# Patient Record
Sex: MALE | Race: ASIAN | NOT HISPANIC OR LATINO
[De-identification: names, ages, dates, MRNs, and addresses within clinical notes are randomized per-mention and may not be internally consistent; named-entity substitution may affect disease eponyms.]

---

## 2017-06-22 ENCOUNTER — CHARTING TRANS (OUTPATIENT)
Dept: OTHER | Age: 67
End: 2017-06-22

## 2017-08-01 ENCOUNTER — CHARTING TRANS (OUTPATIENT)
Dept: OTHER | Age: 67
End: 2017-08-01

## 2017-08-23 ENCOUNTER — LAB SERVICES (OUTPATIENT)
Dept: OTHER | Age: 67
End: 2017-08-23

## 2017-08-23 ENCOUNTER — CHARTING TRANS (OUTPATIENT)
Dept: OTHER | Age: 67
End: 2017-08-23

## 2017-08-23 LAB
GLUCOSE BLD MANUAL STRIP-MCNC: 111 MG/DL (ref 66–97)
GLUCOSE BLD MANUAL STRIP-MCNC: 85 MG/DL (ref 66–97)

## 2017-08-24 ENCOUNTER — CHARTING TRANS (OUTPATIENT)
Dept: OTHER | Age: 67
End: 2017-08-24

## 2017-09-05 ENCOUNTER — CHARTING TRANS (OUTPATIENT)
Dept: OTHER | Age: 67
End: 2017-09-05

## 2017-09-11 ENCOUNTER — LAB SERVICES (OUTPATIENT)
Dept: OTHER | Age: 67
End: 2017-09-11

## 2017-09-11 ENCOUNTER — CHARTING TRANS (OUTPATIENT)
Dept: OTHER | Age: 67
End: 2017-09-11

## 2017-09-11 LAB
GLUCOSE BLD MANUAL STRIP-MCNC: 102 MG/DL (ref 66–97)
GLUCOSE BLD MANUAL STRIP-MCNC: 109 MG/DL (ref 66–97)

## 2017-09-15 ENCOUNTER — CHARTING TRANS (OUTPATIENT)
Dept: OTHER | Age: 67
End: 2017-09-15

## 2017-10-06 ENCOUNTER — CHARTING TRANS (OUTPATIENT)
Dept: OTHER | Age: 67
End: 2017-10-06

## 2017-11-02 ENCOUNTER — CHARTING TRANS (OUTPATIENT)
Dept: OTHER | Age: 67
End: 2017-11-02

## 2019-05-13 ENCOUNTER — OFFICE VISIT (OUTPATIENT)
Dept: OPHTHALMOLOGY | Age: 69
End: 2019-05-13

## 2019-05-13 DIAGNOSIS — H26.491 PCO (POSTERIOR CAPSULAR OPACIFICATION), RIGHT: ICD-10-CM

## 2019-05-13 DIAGNOSIS — H35.373 MACULAR PUCKER, BILATERAL: ICD-10-CM

## 2019-05-13 DIAGNOSIS — Z79.4 TYPE 2 DIABETES MELLITUS WITH BOTH EYES AFFECTED BY PROLIFERATIVE RETINOPATHY WITHOUT MACULAR EDEMA, WITH LONG-TERM CURRENT USE OF INSULIN (CMD): Primary | ICD-10-CM

## 2019-05-13 DIAGNOSIS — E11.3593 TYPE 2 DIABETES MELLITUS WITH BOTH EYES AFFECTED BY PROLIFERATIVE RETINOPATHY WITHOUT MACULAR EDEMA, WITH LONG-TERM CURRENT USE OF INSULIN (CMD): Primary | ICD-10-CM

## 2019-05-13 PROCEDURE — 99214 OFFICE O/P EST MOD 30 MIN: CPT | Performed by: OPHTHALMOLOGY

## 2019-05-16 ENCOUNTER — OFFICE VISIT (OUTPATIENT)
Dept: OPHTHALMOLOGY | Age: 69
End: 2019-05-16

## 2019-05-16 DIAGNOSIS — E11.3593 TYPE 2 DIABETES MELLITUS WITH BOTH EYES AFFECTED BY PROLIFERATIVE RETINOPATHY WITHOUT MACULAR EDEMA, WITH LONG-TERM CURRENT USE OF INSULIN (CMD): Primary | ICD-10-CM

## 2019-05-16 DIAGNOSIS — Z79.4 TYPE 2 DIABETES MELLITUS WITH BOTH EYES AFFECTED BY PROLIFERATIVE RETINOPATHY WITHOUT MACULAR EDEMA, WITH LONG-TERM CURRENT USE OF INSULIN (CMD): Primary | ICD-10-CM

## 2019-05-16 PROCEDURE — 67228 TREATMENT X10SV RETINOPATHY: CPT | Performed by: OPHTHALMOLOGY

## 2019-05-23 ENCOUNTER — OFFICE VISIT (OUTPATIENT)
Dept: OPHTHALMOLOGY | Age: 69
End: 2019-05-23

## 2019-05-23 DIAGNOSIS — Z79.4 TYPE 2 DIABETES MELLITUS WITH BOTH EYES AFFECTED BY PROLIFERATIVE RETINOPATHY WITHOUT MACULAR EDEMA, WITH LONG-TERM CURRENT USE OF INSULIN (CMD): Primary | ICD-10-CM

## 2019-05-23 DIAGNOSIS — E11.3593 TYPE 2 DIABETES MELLITUS WITH BOTH EYES AFFECTED BY PROLIFERATIVE RETINOPATHY WITHOUT MACULAR EDEMA, WITH LONG-TERM CURRENT USE OF INSULIN (CMD): Primary | ICD-10-CM

## 2019-05-23 PROCEDURE — 67228 TREATMENT X10SV RETINOPATHY: CPT | Performed by: OPHTHALMOLOGY

## 2019-07-26 ENCOUNTER — OFFICE VISIT (OUTPATIENT)
Dept: OPHTHALMOLOGY | Age: 69
End: 2019-07-26

## 2019-07-26 DIAGNOSIS — H35.373 MACULAR PUCKER, BILATERAL: ICD-10-CM

## 2019-07-26 DIAGNOSIS — Z79.4 TYPE 2 DIABETES MELLITUS WITH BOTH EYES AFFECTED BY MILD NONPROLIFERATIVE RETINOPATHY WITHOUT MACULAR EDEMA, WITH LONG-TERM CURRENT USE OF INSULIN (CMD): Primary | ICD-10-CM

## 2019-07-26 DIAGNOSIS — E11.3293 TYPE 2 DIABETES MELLITUS WITH BOTH EYES AFFECTED BY MILD NONPROLIFERATIVE RETINOPATHY WITHOUT MACULAR EDEMA, WITH LONG-TERM CURRENT USE OF INSULIN (CMD): Primary | ICD-10-CM

## 2019-07-26 PROBLEM — E11.3593 TYPE 2 DIABETES MELLITUS WITH BOTH EYES AFFECTED BY PROLIFERATIVE RETINOPATHY WITHOUT MACULAR EDEMA, WITH LONG-TERM CURRENT USE OF INSULIN (CMD): Status: RESOLVED | Noted: 2019-05-13 | Resolved: 2019-07-26

## 2019-07-26 PROCEDURE — 99213 OFFICE O/P EST LOW 20 MIN: CPT | Performed by: OPHTHALMOLOGY

## 2020-02-15 ENCOUNTER — APPOINTMENT (OUTPATIENT)
Dept: OPHTHALMOLOGY | Age: 70
End: 2020-02-15

## 2020-03-03 ENCOUNTER — OFFICE VISIT (OUTPATIENT)
Dept: OPHTHALMOLOGY | Age: 70
End: 2020-03-03

## 2020-03-03 DIAGNOSIS — H26.491 PCO (POSTERIOR CAPSULAR OPACIFICATION), RIGHT: ICD-10-CM

## 2020-03-03 DIAGNOSIS — H35.373 MACULAR PUCKER, BILATERAL: ICD-10-CM

## 2020-03-03 DIAGNOSIS — E11.3293 TYPE 2 DIABETES MELLITUS WITH BOTH EYES AFFECTED BY MILD NONPROLIFERATIVE RETINOPATHY WITHOUT MACULAR EDEMA, WITH LONG-TERM CURRENT USE OF INSULIN (CMD): Primary | ICD-10-CM

## 2020-03-03 DIAGNOSIS — Z79.4 TYPE 2 DIABETES MELLITUS WITH BOTH EYES AFFECTED BY MILD NONPROLIFERATIVE RETINOPATHY WITHOUT MACULAR EDEMA, WITH LONG-TERM CURRENT USE OF INSULIN (CMD): Primary | ICD-10-CM

## 2020-03-03 PROCEDURE — 92014 COMPRE OPH EXAM EST PT 1/>: CPT | Performed by: OPHTHALMOLOGY

## 2020-03-03 RX ORDER — INSULIN ASPART 100 [IU]/ML
INJECTION, SUSPENSION SUBCUTANEOUS
Refills: 5 | COMMUNITY
Start: 2020-01-09

## 2020-09-03 ENCOUNTER — OFFICE VISIT (OUTPATIENT)
Dept: OPHTHALMOLOGY | Age: 70
End: 2020-09-03

## 2020-09-03 DIAGNOSIS — H26.491 PCO (POSTERIOR CAPSULAR OPACIFICATION), RIGHT: ICD-10-CM

## 2020-09-03 DIAGNOSIS — Z79.4 TYPE 2 DIABETES MELLITUS WITH BOTH EYES AFFECTED BY MILD NONPROLIFERATIVE RETINOPATHY WITHOUT MACULAR EDEMA, WITH LONG-TERM CURRENT USE OF INSULIN (CMD): Primary | ICD-10-CM

## 2020-09-03 DIAGNOSIS — E11.3293 TYPE 2 DIABETES MELLITUS WITH BOTH EYES AFFECTED BY MILD NONPROLIFERATIVE RETINOPATHY WITHOUT MACULAR EDEMA, WITH LONG-TERM CURRENT USE OF INSULIN (CMD): Primary | ICD-10-CM

## 2020-09-03 DIAGNOSIS — H35.373 MACULAR PUCKER, BILATERAL: ICD-10-CM

## 2020-09-03 PROCEDURE — 92012 INTRM OPH EXAM EST PATIENT: CPT | Performed by: OPHTHALMOLOGY

## 2021-02-02 ENCOUNTER — HOSPITAL ENCOUNTER (INPATIENT)
Facility: HOSPITAL | Age: 71
LOS: 18 days | Discharge: HOME | DRG: 252 | End: 2021-02-20
Attending: EMERGENCY MEDICINE | Admitting: HOSPITALIST
Payer: MEDICARE

## 2021-02-02 ENCOUNTER — APPOINTMENT (OUTPATIENT)
Dept: GENERAL RADIOLOGY | Facility: HOSPITAL | Age: 71
DRG: 252 | End: 2021-02-02
Attending: EMERGENCY MEDICINE
Payer: MEDICARE

## 2021-02-02 DIAGNOSIS — L02.612 ABSCESS OF LEFT FOOT: ICD-10-CM

## 2021-02-02 DIAGNOSIS — E11.9 TYPE 2 DIABETES MELLITUS WITHOUT COMPLICATION, UNSPECIFIED WHETHER LONG TERM INSULIN USE (HCC): ICD-10-CM

## 2021-02-02 DIAGNOSIS — E11.628 DIABETIC FOOT INFECTION (HCC): Primary | ICD-10-CM

## 2021-02-02 DIAGNOSIS — M86.9 OSTEITIS OF ANKLE AND FOOT (HCC): ICD-10-CM

## 2021-02-02 DIAGNOSIS — L08.9 DIABETIC FOOT INFECTION (HCC): Primary | ICD-10-CM

## 2021-02-02 LAB
ALBUMIN SERPL-MCNC: 2.3 G/DL (ref 3.4–5)
ALBUMIN/GLOB SERPL: 0.4 {RATIO} (ref 1–2)
ALP LIVER SERPL-CCNC: 327 U/L
ALT SERPL-CCNC: 31 U/L
ANION GAP SERPL CALC-SCNC: 5 MMOL/L (ref 0–18)
AST SERPL-CCNC: 32 U/L (ref 15–37)
BASOPHILS # BLD AUTO: 0.02 X10(3) UL (ref 0–0.2)
BASOPHILS NFR BLD AUTO: 0.2 %
BILIRUB SERPL-MCNC: 1 MG/DL (ref 0.1–2)
BUN BLD-MCNC: 15 MG/DL (ref 7–18)
BUN/CREAT SERPL: 10.3 (ref 10–20)
CALCIUM BLD-MCNC: 9.1 MG/DL (ref 8.5–10.1)
CHLORIDE SERPL-SCNC: 101 MMOL/L (ref 98–112)
CO2 SERPL-SCNC: 29 MMOL/L (ref 21–32)
CREAT BLD-MCNC: 1.46 MG/DL
DEPRECATED RDW RBC AUTO: 43.5 FL (ref 35.1–46.3)
EOSINOPHIL # BLD AUTO: 0.13 X10(3) UL (ref 0–0.7)
EOSINOPHIL NFR BLD AUTO: 1.4 %
ERYTHROCYTE [DISTWIDTH] IN BLOOD BY AUTOMATED COUNT: 14.7 % (ref 11–15)
EST. AVERAGE GLUCOSE BLD GHB EST-MCNC: 148 MG/DL (ref 68–126)
GLOBULIN PLAS-MCNC: 5.4 G/DL (ref 2.8–4.4)
GLUCOSE BLD-MCNC: 135 MG/DL (ref 70–99)
GLUCOSE BLD-MCNC: 160 MG/DL (ref 70–99)
GLUCOSE BLD-MCNC: 169 MG/DL (ref 70–99)
HBA1C MFR BLD HPLC: 6.8 % (ref ?–5.7)
HCT VFR BLD AUTO: 30.4 %
HGB BLD-MCNC: 9.6 G/DL
IMM GRANULOCYTES # BLD AUTO: 0.19 X10(3) UL (ref 0–1)
IMM GRANULOCYTES NFR BLD: 2.1 %
LACTATE SERPL-SCNC: 2 MMOL/L (ref 0.4–2)
LYMPHOCYTES # BLD AUTO: 1.26 X10(3) UL (ref 1–4)
LYMPHOCYTES NFR BLD AUTO: 13.9 %
M PROTEIN MFR SERPL ELPH: 7.7 G/DL (ref 6.4–8.2)
MCH RBC QN AUTO: 25.7 PG (ref 26–34)
MCHC RBC AUTO-ENTMCNC: 31.6 G/DL (ref 31–37)
MCV RBC AUTO: 81.5 FL
MONOCYTES # BLD AUTO: 0.73 X10(3) UL (ref 0.1–1)
MONOCYTES NFR BLD AUTO: 8 %
NEUTROPHILS # BLD AUTO: 6.74 X10 (3) UL (ref 1.5–7.7)
NEUTROPHILS # BLD AUTO: 6.74 X10(3) UL (ref 1.5–7.7)
NEUTROPHILS NFR BLD AUTO: 74.4 %
OSMOLALITY SERPL CALC.SUM OF ELEC: 284 MOSM/KG (ref 275–295)
PLATELET # BLD AUTO: 294 10(3)UL (ref 150–450)
POTASSIUM SERPL-SCNC: 4 MMOL/L (ref 3.5–5.1)
RBC # BLD AUTO: 3.73 X10(6)UL
SARS-COV-2 RNA RESP QL NAA+PROBE: NOT DETECTED
SODIUM SERPL-SCNC: 135 MMOL/L (ref 136–145)
WBC # BLD AUTO: 9.1 X10(3) UL (ref 4–11)

## 2021-02-02 PROCEDURE — 99223 1ST HOSP IP/OBS HIGH 75: CPT | Performed by: HOSPITALIST

## 2021-02-02 PROCEDURE — 73630 X-RAY EXAM OF FOOT: CPT | Performed by: EMERGENCY MEDICINE

## 2021-02-02 RX ORDER — VANCOMYCIN HYDROCHLORIDE
15 EVERY 12 HOURS
Status: DISCONTINUED | OUTPATIENT
Start: 2021-02-03 | End: 2021-02-04

## 2021-02-02 RX ORDER — MORPHINE SULFATE 2 MG/ML
1 INJECTION, SOLUTION INTRAMUSCULAR; INTRAVENOUS EVERY 2 HOUR PRN
Status: DISCONTINUED | OUTPATIENT
Start: 2021-02-02 | End: 2021-02-10

## 2021-02-02 RX ORDER — INSULIN ASPART 100 [IU]/ML
INJECTION, SUSPENSION SUBCUTANEOUS 3 TIMES DAILY PRN
Status: ON HOLD | COMMUNITY
End: 2021-12-28

## 2021-02-02 RX ORDER — HYDROCODONE BITARTRATE AND ACETAMINOPHEN 5; 325 MG/1; MG/1
1 TABLET ORAL EVERY 4 HOURS PRN
Status: DISCONTINUED | OUTPATIENT
Start: 2021-02-02 | End: 2021-02-10 | Stop reason: SDUPTHER

## 2021-02-02 RX ORDER — SODIUM CHLORIDE 9 MG/ML
INJECTION, SOLUTION INTRAVENOUS CONTINUOUS
Status: DISCONTINUED | OUTPATIENT
Start: 2021-02-02 | End: 2021-02-04

## 2021-02-02 RX ORDER — ONDANSETRON 2 MG/ML
4 INJECTION INTRAMUSCULAR; INTRAVENOUS EVERY 6 HOURS PRN
Status: DISCONTINUED | OUTPATIENT
Start: 2021-02-02 | End: 2021-02-20

## 2021-02-02 RX ORDER — VANCOMYCIN 2 GRAM/500 ML IN 0.9 % SODIUM CHLORIDE INTRAVENOUS
25 ONCE
Status: COMPLETED | OUTPATIENT
Start: 2021-02-02 | End: 2021-02-02

## 2021-02-02 RX ORDER — METOCLOPRAMIDE HYDROCHLORIDE 5 MG/ML
10 INJECTION INTRAMUSCULAR; INTRAVENOUS EVERY 8 HOURS PRN
Status: DISCONTINUED | OUTPATIENT
Start: 2021-02-02 | End: 2021-02-20

## 2021-02-02 RX ORDER — MORPHINE SULFATE 2 MG/ML
2 INJECTION, SOLUTION INTRAMUSCULAR; INTRAVENOUS EVERY 2 HOUR PRN
Status: DISCONTINUED | OUTPATIENT
Start: 2021-02-02 | End: 2021-02-10

## 2021-02-02 RX ORDER — DEXTROSE MONOHYDRATE 25 G/50ML
50 INJECTION, SOLUTION INTRAVENOUS
Status: DISCONTINUED | OUTPATIENT
Start: 2021-02-02 | End: 2021-02-20

## 2021-02-02 RX ORDER — ACETAMINOPHEN 325 MG/1
650 TABLET ORAL EVERY 4 HOURS PRN
Status: DISCONTINUED | OUTPATIENT
Start: 2021-02-02 | End: 2021-02-10 | Stop reason: SDUPTHER

## 2021-02-02 RX ORDER — HYDROCODONE BITARTRATE AND ACETAMINOPHEN 5; 325 MG/1; MG/1
2 TABLET ORAL EVERY 4 HOURS PRN
Status: DISCONTINUED | OUTPATIENT
Start: 2021-02-02 | End: 2021-02-10 | Stop reason: SDUPTHER

## 2021-02-02 NOTE — ED PROVIDER NOTES
Patient Seen in: [facility] Emergency Department      History   Patient presents with:  Cellulitis    Stated Complaint: Leg infection ~ hx of diabetes     HPI/Subjective:   HPI    26-year-old male who has a history of diabetes neuropathy comes to th bowel sounds without rebound, guarding or masses noted  Back nontender without CVA tenderness  Extremity erythema of his left foot noted up to the midportion of his calf muscle.  He has two large ulcerations with pussy discharge to the left lateral foot oth COMPARISON:  None. INDICATIONS:  Leg infection ~ hx of diabetes  PATIENT STATED HISTORY: (As transcribed by Technologist)  Pt was at PCP and sent over for evaluation of left leg cellulitis. Pt has  hx of diabetes.  Pt has recent left fifth digit amputation

## 2021-02-02 NOTE — ED INITIAL ASSESSMENT (HPI)
Pt was at [facility] and sent over for evaluation of left leg cellulitis. Pt has hx of diabetes. Pt has recent left fifth digit amputation and wound.

## 2021-02-03 ENCOUNTER — APPOINTMENT (OUTPATIENT)
Dept: ULTRASOUND IMAGING | Facility: HOSPITAL | Age: 71
DRG: 252 | End: 2021-02-03
Attending: HOSPITALIST
Payer: MEDICARE

## 2021-02-03 ENCOUNTER — TELEPHONE (OUTPATIENT)
Dept: PODIATRY CLINIC | Facility: CLINIC | Age: 71
End: 2021-02-03

## 2021-02-03 ENCOUNTER — ANESTHESIA EVENT (OUTPATIENT)
Dept: SURGERY | Facility: HOSPITAL | Age: 71
DRG: 252 | End: 2021-02-03
Payer: MEDICARE

## 2021-02-03 ENCOUNTER — APPOINTMENT (OUTPATIENT)
Dept: MRI IMAGING | Facility: HOSPITAL | Age: 71
DRG: 252 | End: 2021-02-03
Attending: HOSPITALIST
Payer: MEDICARE

## 2021-02-03 DIAGNOSIS — L02.612 ABSCESS OF LEFT FOOT: Primary | ICD-10-CM

## 2021-02-03 DIAGNOSIS — M86.9 OSTEITIS OF ANKLE AND FOOT (HCC): ICD-10-CM

## 2021-02-03 LAB
ANION GAP SERPL CALC-SCNC: 5 MMOL/L (ref 0–18)
BASOPHILS # BLD AUTO: 0.04 X10(3) UL (ref 0–0.2)
BASOPHILS NFR BLD AUTO: 0.4 %
BILIRUB UR QL STRIP.AUTO: NEGATIVE
BUN BLD-MCNC: 16 MG/DL (ref 7–18)
BUN/CREAT SERPL: 10.7 (ref 10–20)
CALCIUM BLD-MCNC: 8.5 MG/DL (ref 8.5–10.1)
CHLORIDE SERPL-SCNC: 106 MMOL/L (ref 98–112)
CHOLEST SMN-MCNC: 100 MG/DL (ref ?–200)
CLARITY UR REFRACT.AUTO: CLEAR
CO2 SERPL-SCNC: 26 MMOL/L (ref 21–32)
COLOR UR AUTO: YELLOW
CREAT BLD-MCNC: 1.49 MG/DL
CREAT UR-SCNC: 75 MG/DL
DEPRECATED RDW RBC AUTO: 45.3 FL (ref 35.1–46.3)
EOSINOPHIL # BLD AUTO: 0.09 X10(3) UL (ref 0–0.7)
EOSINOPHIL NFR BLD AUTO: 1 %
ERYTHROCYTE [DISTWIDTH] IN BLOOD BY AUTOMATED COUNT: 15 % (ref 11–15)
GLUCOSE BLD-MCNC: 122 MG/DL (ref 70–99)
GLUCOSE BLD-MCNC: 136 MG/DL (ref 70–99)
GLUCOSE BLD-MCNC: 142 MG/DL (ref 70–99)
GLUCOSE BLD-MCNC: 176 MG/DL (ref 70–99)
GLUCOSE BLD-MCNC: 247 MG/DL (ref 70–99)
GLUCOSE BLD-MCNC: 256 MG/DL (ref 70–99)
GLUCOSE UR STRIP.AUTO-MCNC: NEGATIVE MG/DL
HCT VFR BLD AUTO: 29.5 %
HDLC SERPL-MCNC: 27 MG/DL (ref 40–59)
HGB BLD-MCNC: 9.1 G/DL
IMM GRANULOCYTES # BLD AUTO: 0.1 X10(3) UL (ref 0–1)
IMM GRANULOCYTES NFR BLD: 1.1 %
KETONES UR STRIP.AUTO-MCNC: NEGATIVE MG/DL
LDLC SERPL CALC-MCNC: 55 MG/DL (ref ?–100)
LDLC SERPL DIRECT ASSAY-MCNC: 59 MG/DL (ref ?–100)
LEUKOCYTE ESTERASE UR QL STRIP.AUTO: NEGATIVE
LYMPHOCYTES # BLD AUTO: 1.69 X10(3) UL (ref 1–4)
LYMPHOCYTES NFR BLD AUTO: 18.4 %
MCH RBC QN AUTO: 25.9 PG (ref 26–34)
MCHC RBC AUTO-ENTMCNC: 30.8 G/DL (ref 31–37)
MCV RBC AUTO: 84 FL
MICROALBUMIN UR-MCNC: 39.9 MG/DL
MICROALBUMIN/CREAT 24H UR-RTO: 532 UG/MG (ref ?–30)
MONOCYTES # BLD AUTO: 0.98 X10(3) UL (ref 0.1–1)
MONOCYTES NFR BLD AUTO: 10.7 %
NEUTROPHILS # BLD AUTO: 6.29 X10 (3) UL (ref 1.5–7.7)
NEUTROPHILS # BLD AUTO: 6.29 X10(3) UL (ref 1.5–7.7)
NEUTROPHILS NFR BLD AUTO: 68.4 %
NITRITE UR QL STRIP.AUTO: NEGATIVE
NONHDLC SERPL-MCNC: 73 MG/DL (ref ?–130)
OSMOLALITY SERPL CALC.SUM OF ELEC: 286 MOSM/KG (ref 275–295)
PH UR STRIP.AUTO: 7 [PH] (ref 4.5–8)
PLATELET # BLD AUTO: 281 10(3)UL (ref 150–450)
POTASSIUM SERPL-SCNC: 4.7 MMOL/L (ref 3.5–5.1)
PROT UR STRIP.AUTO-MCNC: 100 MG/DL
RBC # BLD AUTO: 3.51 X10(6)UL
SODIUM SERPL-SCNC: 137 MMOL/L (ref 136–145)
SP GR UR STRIP.AUTO: 1.02 (ref 1–1.03)
TRIGL SERPL-MCNC: 88 MG/DL (ref 30–149)
UROBILINOGEN UR STRIP.AUTO-MCNC: <2 MG/DL
VLDLC SERPL CALC-MCNC: 18 MG/DL (ref 0–30)
WBC # BLD AUTO: 9.2 X10(3) UL (ref 4–11)

## 2021-02-03 PROCEDURE — 99223 1ST HOSP IP/OBS HIGH 75: CPT | Performed by: PODIATRIST

## 2021-02-03 PROCEDURE — 99233 SBSQ HOSP IP/OBS HIGH 50: CPT | Performed by: HOSPITALIST

## 2021-02-03 PROCEDURE — 99223 1ST HOSP IP/OBS HIGH 75: CPT | Performed by: INTERNAL MEDICINE

## 2021-02-03 PROCEDURE — 99221 1ST HOSP IP/OBS SF/LOW 40: CPT | Performed by: INTERNAL MEDICINE

## 2021-02-03 PROCEDURE — 73718 MRI LOWER EXTREMITY W/O DYE: CPT | Performed by: HOSPITALIST

## 2021-02-03 PROCEDURE — 93926 LOWER EXTREMITY STUDY: CPT | Performed by: HOSPITALIST

## 2021-02-03 RX ORDER — SODIUM CHLORIDE 9 MG/ML
INJECTION, SOLUTION INTRAVENOUS CONTINUOUS
Status: ACTIVE | OUTPATIENT
Start: 2021-02-03 | End: 2021-02-04

## 2021-02-03 RX ORDER — ENOXAPARIN SODIUM 100 MG/ML
40 INJECTION SUBCUTANEOUS DAILY
Status: DISCONTINUED | OUTPATIENT
Start: 2021-02-03 | End: 2021-02-04

## 2021-02-03 RX ORDER — POLYETHYLENE GLYCOL 3350 17 G/17G
17 POWDER, FOR SOLUTION ORAL DAILY
Status: DISCONTINUED | OUTPATIENT
Start: 2021-02-03 | End: 2021-02-20

## 2021-02-03 RX ORDER — METRONIDAZOLE 500 MG/100ML
500 INJECTION, SOLUTION INTRAVENOUS EVERY 8 HOURS
Status: DISCONTINUED | OUTPATIENT
Start: 2021-02-03 | End: 2021-02-04

## 2021-02-03 RX ORDER — MAGNESIUM OXIDE 400 MG (241.3 MG MAGNESIUM) TABLET
2 TABLET NIGHTLY
Status: DISCONTINUED | OUTPATIENT
Start: 2021-02-03 | End: 2021-02-07

## 2021-02-03 NOTE — CONSULTS
[facility]  Report of Consultation    Josephine Demiguel Patient Status:  Inpatient    1950 MRN [id number]   [facility] 5NW-A Attending Maureen Ponce MD   Hosp Day # 1 PCP Shyla Chun     Date of Admission:  2021  Date of Consult **OR** HYDROcodone-acetaminophen (NORCO) 5-325 MG per tab 1 tablet, 1 tablet, Oral, Q4H PRN **OR** HYDROcodone-acetaminophen (NORCO) 5-325 MG per tab 2 tablet, 2 tablet, Oral, Q4H PRN  •  morphINE sulfate (PF) 2 MG/ML injection 1 mg, 1 mg, Intravenous, Q2H ALT 31 02/02/2021    PGLU 136 02/03/2021       Impression and Plan:  Diabetic foot infection with draining purulence and cellulitis left foot. Patient will need surgical debridement.   Will obtain MRI to more thoroughly evaluate for underlying osteomyeliti

## 2021-02-03 NOTE — H&P
[facility]  History and Physical     Earna [facility] Patient Status:  Inpatient    1950 MRN [id number]   [facility] 5NW-A Attending Silvio Espinosa MD   Hosp Day # 0 PCP LUIS ALFREDO SHAFFER     Chief Complaint: foot infection    History Anicteric. Neck: No lymphadenopathy. No JVD. No carotid bruits. Respiratory: Clear to auscultation bilaterally. No wheezes. No rhonchi. Cardiovascular: S1, S2. Regular rate and rhythm. No murmurs, rubs or gallops. Equal pulses.    Chest and Back: No tend two midnight's based on the clinical documentation in H+P. Based on patients current state of illness, I anticipate that, after discharge, patient will require TBD.

## 2021-02-03 NOTE — CONSULTS
[facility]  Report of Consultation    Lisandro Simpson Patient Status:  Inpatient    1950 MRN [id number]   [facility] 5NW-A Attending Anuja Baron MD   Hosp Day # 1  [facility]     Reason for Consultation:  CKD III/need for io Units, Subcutaneous, TID CC and HS  •  0.9% NaCl infusion, , Intravenous, Continuous  •  acetaminophen (TYLENOL) tab 650 mg, 650 mg, Oral, Q4H PRN **OR** HYDROcodone-acetaminophen (NORCO) 5-325 MG per tab 1 tablet, 1 tablet, Oral, Q4H PRN **OR** HYDROcodon cyanosis or edema.  L foot notable for erythema and purulent drainage from mult wounds  Neurologic: Alert and oriented, cranial nerves grossly intact, moving all extremities  Skin: Warm and dry, no rashes      Laboratory Data:  Lab Results   Component Value concerns.        Regla Barrow  2/3/2021  4:42 PM

## 2021-02-03 NOTE — PROGRESS NOTES
Dr Familia Carlson and Dr Randolph Cowden consults were called in, ID returned call, waiting for podiatrist to call back

## 2021-02-03 NOTE — PLAN OF CARE
Patient A/O x 4. VSS. Maintaining O2 saturations >92% on RA. Glasses at bedside. C/o moderate discomfort d/t left foot cellulitis, open wounds. Black, eschar noted. Bloody/purulent discharge noted on sheets.  Rinsed w/ saline - 4x4 applied and wrapped in ke Short Term Goal  Description: Patient's Short Term Goal:   2/2 NOC: rest, pain control    Interventions:   - medications per MAR  - decrease environmental stimuli  - See additional Care Plan goals for specific interventions  Outcome: Progressing     Proble

## 2021-02-03 NOTE — CONSULTS
[facility]  Report of Consultation    Julio Bundy Patient Status:  Inpatient    1950 MRN [id number]   [facility] 5NW-A Attending Chari Durant MD   Hosp Day # 1  [facility]     Reason for Consultation:  Infected left fo Continuous  •  glucose (DEX4) oral liquid 15 g, 15 g, Oral, Q15 Min PRN **OR** Glucose-Vitamin C (DEX-4) chewable tab 4 tablet, 4 tablet, Oral, Q15 Min PRN **OR** dextrose 50 % injection 50 mL, 50 mL, Intravenous, Q15 Min PRN **OR** glucose (DEX4) oral liq affect. Skin: Warm and dry. Laboratories and Data:  Diagnostics:    Labs:   Lab Results   Component Value Date    HGB 9.1 02/03/2021    HCT 29.5 02/03/2021    .0 02/03/2021               Assessment/Plan:    1.  PAD -patient has a cellulitis and

## 2021-02-03 NOTE — CONSULTS
INFECTIOUS DISEASE CONSULT NOTE    Lisandro Simpson Patient Status:  Inpatient    1950 MRN [id number]   [facility] 5NW-A Attending Anuja Baron MD   Hosp Day # 1 PCP Arely Call Glucose-Vitamin C (DEX-4) chewable tab 8 tablet, 8 tablet, Oral, Q15 Min PRN  •  Insulin Aspart Pen (NOVOLOG) 100 UNIT/ML flexpen 2-10 Units, 2-10 Units, Subcutaneous, TID CC and HS  •  0.9% NaCl infusion, , Intravenous, Continuous  •  acetaminophen (TYLEN lymphadenopathy. Supple. Respiratory: Clear to auscultation bilaterally. No wheezes. No rhonchi. Cardiovascular: S1, S2.  Regular rate and rhythm. No murmurs. Abdomen: Soft, nontender, nondistended. Positive bowel sounds.   Musculoskeletal: Full rang Result No Growth 1 Day N/A         Radiology: Xr Foot, Complete (min 3 Views), Left (cpt=73630)    Result Date: 2/2/2021  PROCEDURE:  XR FOOT, COMPLETE (MIN 3 VIEWS), LEFT (CPT=73630)  TECHNIQUE:  AP, oblique, and lateral views were obtained.   COMPARISON: have any questions. I will continue to follow with you and will make further recommendations based on his progress. Chavo 27  2/3/2021  1:24 PM    Addendum    MRI noted. Has OM of the 5th metatarsal. D/w Dr Loni Harrison.  Plan is for OR tomorrow, agree

## 2021-02-03 NOTE — ANESTHESIA PREPROCEDURE EVALUATION
PRE-OP EVALUATION    Patient Name: Rose Brown    Pre-op Diagnosis: Abscess of left foot [L02.612]  Osteitis of ankle and foot ([address] [M86.9]    Procedure(s):  Debridement of left foot infection with possible amputation of 5th metarsal.    Surgeon(s) and •  ondansetron HCl (ZOFRAN) injection 4 mg, 4 mg, Intravenous, Q6H PRN    •  Metoclopramide HCl (REGLAN) injection 10 mg, 10 mg, Intravenous, Q8H PRN    •  [COMPLETED] vancomycin IVPB premix 2g in 0.9% NaCl 500 mL, 25 mg/kg, Intravenous, Once    •  vancomy CA 8.5 02/03/2021            Airway      Mallampati: III    TM distance: 4 - 6 cm  Neck ROM: full Cardiovascular    Cardiovascular exam normal.  Rhythm: regular  Rate: normal     Dental    No notable dental history.          Pulmonary    Pulmonary exam nor

## 2021-02-03 NOTE — TELEPHONE ENCOUNTER
Per [facility] scheduled patient who is currently an in patient in room 503 for 2/4/21 at 5:30 pm

## 2021-02-03 NOTE — PROGRESS NOTES
[facility] Dosing Service    Initial Pharmacokinetic Consult for Vancomycin Dosing     Tommy Candelaria is a 65year old patient who is being treated for cellulitis. Pharmacy has been asked to dose Vancomycin by Dr. Janna Seals.     Allergies:  Patient has no kn

## 2021-02-03 NOTE — PROGRESS NOTES
[facility]  Progress note     Lottie Pool Patient Status:  Inpatient    1950 MRN [id number]   [facility] 5NW-A Attending Ashia Areavlo MD   Hosp Day # 1 PCP LUIS ALFREDO SHAFFER     Chief Complaint: foot infection  Subjective: linda cellulitis, DFU; suspect osteo  1. IV abx, f/u cultures  2. ID eval  3. Podiatry eval  4. MRI and arterial dopplers ordered  2. DMII, hyperglycemia protocol  3. Diabetic neuropathy  4. Suspect CKD, no baseline Cr to compare  1. Monitor gfr  5.  HTN, ?relate

## 2021-02-04 ENCOUNTER — APPOINTMENT (OUTPATIENT)
Dept: CT IMAGING | Facility: HOSPITAL | Age: 71
DRG: 252 | End: 2021-02-04
Attending: SURGERY
Payer: MEDICARE

## 2021-02-04 ENCOUNTER — APPOINTMENT (OUTPATIENT)
Dept: CV DIAGNOSTICS | Facility: HOSPITAL | Age: 71
DRG: 252 | End: 2021-02-04
Attending: INTERNAL MEDICINE
Payer: MEDICARE

## 2021-02-04 ENCOUNTER — APPOINTMENT (OUTPATIENT)
Dept: ULTRASOUND IMAGING | Facility: HOSPITAL | Age: 71
DRG: 252 | End: 2021-02-04
Attending: SURGERY
Payer: MEDICARE

## 2021-02-04 ENCOUNTER — ANESTHESIA (OUTPATIENT)
Dept: SURGERY | Facility: HOSPITAL | Age: 71
DRG: 252 | End: 2021-02-04
Payer: MEDICARE

## 2021-02-04 LAB
ANION GAP SERPL CALC-SCNC: 7 MMOL/L (ref 0–18)
ATRIAL RATE: 73 BPM
BUN BLD-MCNC: 20 MG/DL (ref 7–18)
BUN/CREAT SERPL: 15 (ref 10–20)
CALCIUM BLD-MCNC: 8.3 MG/DL (ref 8.5–10.1)
CHLORIDE SERPL-SCNC: 105 MMOL/L (ref 98–112)
CO2 SERPL-SCNC: 24 MMOL/L (ref 21–32)
CREAT BLD-MCNC: 1.33 MG/DL
CRP SERPL-MCNC: 9.95 MG/DL (ref ?–0.3)
GLUCOSE BLD-MCNC: 128 MG/DL (ref 70–99)
GLUCOSE BLD-MCNC: 141 MG/DL (ref 70–99)
GLUCOSE BLD-MCNC: 152 MG/DL (ref 70–99)
GLUCOSE BLD-MCNC: 154 MG/DL (ref 70–99)
GLUCOSE BLD-MCNC: 170 MG/DL (ref 70–99)
GLUCOSE BLD-MCNC: 178 MG/DL (ref 70–99)
GLUCOSE BLD-MCNC: 208 MG/DL (ref 70–99)
GLUCOSE BLD-MCNC: 241 MG/DL (ref 70–99)
OSMOLALITY SERPL CALC.SUM OF ELEC: 289 MOSM/KG (ref 275–295)
P AXIS: 45 DEGREES
P-R INTERVAL: 150 MS
POTASSIUM SERPL-SCNC: 4.3 MMOL/L (ref 3.5–5.1)
Q-T INTERVAL: 390 MS
QRS DURATION: 62 MS
QTC CALCULATION (BEZET): 429 MS
R AXIS: 56 DEGREES
SED RATE-ML: 104 MM/HR
SODIUM SERPL-SCNC: 136 MMOL/L (ref 136–145)
T AXIS: -26 DEGREES
VANCOMYCIN TROUGH SERPL-MCNC: 17.2 UG/ML (ref 10–20)
VENTRICULAR RATE: 73 BPM

## 2021-02-04 PROCEDURE — 93306 TTE W/DOPPLER COMPLETE: CPT | Performed by: INTERNAL MEDICINE

## 2021-02-04 PROCEDURE — 0QBP0ZZ EXCISION OF LEFT METATARSAL, OPEN APPROACH: ICD-10-PCS | Performed by: PODIATRIST

## 2021-02-04 PROCEDURE — 73706 CT ANGIO LWR EXTR W/O&W/DYE: CPT | Performed by: SURGERY

## 2021-02-04 PROCEDURE — 99232 SBSQ HOSP IP/OBS MODERATE 35: CPT | Performed by: INTERNAL MEDICINE

## 2021-02-04 PROCEDURE — 99233 SBSQ HOSP IP/OBS HIGH 50: CPT | Performed by: INTERNAL MEDICINE

## 2021-02-04 PROCEDURE — 99232 SBSQ HOSP IP/OBS MODERATE 35: CPT | Performed by: HOSPITALIST

## 2021-02-04 PROCEDURE — 28122 PARTIAL REMOVAL OF FOOT BONE: CPT | Performed by: PODIATRIST

## 2021-02-04 PROCEDURE — 0J9R0ZZ DRAINAGE OF LEFT FOOT SUBCUTANEOUS TISSUE AND FASCIA, OPEN APPROACH: ICD-10-PCS | Performed by: PODIATRIST

## 2021-02-04 PROCEDURE — 93970 EXTREMITY STUDY: CPT | Performed by: SURGERY

## 2021-02-04 RX ORDER — HYDROCODONE BITARTRATE AND ACETAMINOPHEN 5; 325 MG/1; MG/1
2 TABLET ORAL AS NEEDED
Status: DISCONTINUED | OUTPATIENT
Start: 2021-02-04 | End: 2021-02-04 | Stop reason: HOSPADM

## 2021-02-04 RX ORDER — ENOXAPARIN SODIUM 100 MG/ML
40 INJECTION SUBCUTANEOUS NIGHTLY
Status: DISCONTINUED | OUTPATIENT
Start: 2021-02-05 | End: 2021-02-10

## 2021-02-04 RX ORDER — MIDAZOLAM HYDROCHLORIDE 1 MG/ML
INJECTION INTRAMUSCULAR; INTRAVENOUS AS NEEDED
Status: DISCONTINUED | OUTPATIENT
Start: 2021-02-04 | End: 2021-02-04 | Stop reason: SURG

## 2021-02-04 RX ORDER — ASPIRIN 81 MG/1
81 TABLET ORAL DAILY
Status: DISCONTINUED | OUTPATIENT
Start: 2021-02-04 | End: 2021-02-10 | Stop reason: SDUPTHER

## 2021-02-04 RX ORDER — ATORVASTATIN CALCIUM 40 MG/1
40 TABLET, FILM COATED ORAL NIGHTLY
Status: DISCONTINUED | OUTPATIENT
Start: 2021-02-04 | End: 2021-02-20

## 2021-02-04 RX ORDER — SODIUM CHLORIDE 9 MG/ML
INJECTION, SOLUTION INTRAVENOUS CONTINUOUS PRN
Status: DISCONTINUED | OUTPATIENT
Start: 2021-02-04 | End: 2021-02-04 | Stop reason: SURG

## 2021-02-04 RX ORDER — DEXTROSE MONOHYDRATE 25 G/50ML
50 INJECTION, SOLUTION INTRAVENOUS
Status: DISCONTINUED | OUTPATIENT
Start: 2021-02-04 | End: 2021-02-04 | Stop reason: HOSPADM

## 2021-02-04 RX ORDER — METOPROLOL SUCCINATE 25 MG/1
25 TABLET, EXTENDED RELEASE ORAL
Status: DISCONTINUED | OUTPATIENT
Start: 2021-02-04 | End: 2021-02-20

## 2021-02-04 RX ORDER — LABETALOL HYDROCHLORIDE 5 MG/ML
5 INJECTION, SOLUTION INTRAVENOUS EVERY 5 MIN PRN
Status: DISCONTINUED | OUTPATIENT
Start: 2021-02-04 | End: 2021-02-04 | Stop reason: HOSPADM

## 2021-02-04 RX ORDER — SODIUM CHLORIDE, SODIUM LACTATE, POTASSIUM CHLORIDE, CALCIUM CHLORIDE 600; 310; 30; 20 MG/100ML; MG/100ML; MG/100ML; MG/100ML
INJECTION, SOLUTION INTRAVENOUS CONTINUOUS
Status: DISCONTINUED | OUTPATIENT
Start: 2021-02-04 | End: 2021-02-04 | Stop reason: HOSPADM

## 2021-02-04 RX ORDER — PHENYLEPHRINE HCL 10 MG/ML
VIAL (ML) INJECTION AS NEEDED
Status: DISCONTINUED | OUTPATIENT
Start: 2021-02-04 | End: 2021-02-04 | Stop reason: SURG

## 2021-02-04 RX ORDER — ONDANSETRON 2 MG/ML
4 INJECTION INTRAMUSCULAR; INTRAVENOUS AS NEEDED
Status: DISCONTINUED | OUTPATIENT
Start: 2021-02-04 | End: 2021-02-04 | Stop reason: HOSPADM

## 2021-02-04 RX ORDER — NALOXONE HYDROCHLORIDE 0.4 MG/ML
80 INJECTION, SOLUTION INTRAMUSCULAR; INTRAVENOUS; SUBCUTANEOUS AS NEEDED
Status: DISCONTINUED | OUTPATIENT
Start: 2021-02-04 | End: 2021-02-04 | Stop reason: HOSPADM

## 2021-02-04 RX ORDER — LIDOCAINE HYDROCHLORIDE 10 MG/ML
INJECTION, SOLUTION EPIDURAL; INFILTRATION; INTRACAUDAL; PERINEURAL AS NEEDED
Status: DISCONTINUED | OUTPATIENT
Start: 2021-02-04 | End: 2021-02-04 | Stop reason: SURG

## 2021-02-04 RX ORDER — HYDROCODONE BITARTRATE AND ACETAMINOPHEN 5; 325 MG/1; MG/1
1 TABLET ORAL AS NEEDED
Status: DISCONTINUED | OUTPATIENT
Start: 2021-02-04 | End: 2021-02-04 | Stop reason: HOSPADM

## 2021-02-04 RX ORDER — ONDANSETRON 2 MG/ML
INJECTION INTRAMUSCULAR; INTRAVENOUS AS NEEDED
Status: DISCONTINUED | OUTPATIENT
Start: 2021-02-04 | End: 2021-02-04 | Stop reason: SURG

## 2021-02-04 RX ORDER — HYDROMORPHONE HYDROCHLORIDE 1 MG/ML
0.4 INJECTION, SOLUTION INTRAMUSCULAR; INTRAVENOUS; SUBCUTANEOUS EVERY 5 MIN PRN
Status: DISCONTINUED | OUTPATIENT
Start: 2021-02-04 | End: 2021-02-04 | Stop reason: HOSPADM

## 2021-02-04 RX ADMIN — ONDANSETRON 4 MG: 2 INJECTION INTRAMUSCULAR; INTRAVENOUS at 17:03:00

## 2021-02-04 RX ADMIN — PHENYLEPHRINE HCL 100 MCG: 10 MG/ML VIAL (ML) INJECTION at 17:30:00

## 2021-02-04 RX ADMIN — PHENYLEPHRINE HCL 100 MCG: 10 MG/ML VIAL (ML) INJECTION at 17:03:00

## 2021-02-04 RX ADMIN — LIDOCAINE HYDROCHLORIDE 50 MG: 10 INJECTION, SOLUTION EPIDURAL; INFILTRATION; INTRACAUDAL; PERINEURAL at 17:03:00

## 2021-02-04 RX ADMIN — SODIUM CHLORIDE: 9 INJECTION, SOLUTION INTRAVENOUS at 18:48:00

## 2021-02-04 RX ADMIN — PHENYLEPHRINE HCL 100 MCG: 10 MG/ML VIAL (ML) INJECTION at 17:20:00

## 2021-02-04 RX ADMIN — MIDAZOLAM HYDROCHLORIDE 2 MG: 1 INJECTION INTRAMUSCULAR; INTRAVENOUS at 17:00:00

## 2021-02-04 RX ADMIN — PHENYLEPHRINE HCL 100 MCG: 10 MG/ML VIAL (ML) INJECTION at 17:10:00

## 2021-02-04 RX ADMIN — SODIUM CHLORIDE: 9 INJECTION, SOLUTION INTRAVENOUS at 16:59:00

## 2021-02-04 NOTE — PROGRESS NOTES
[facility]  Cardiology  Progress Note    Danielle Saldivar Patient Status:  Inpatient    1950 MRN [id number]   [facility] 5NW-A Attending Nara Lind MD   Hosp Day # 2 PCP LUIS ALFREDO SHAFFER       Subjective:  Denies any complaints.  Leelee Le size was normal. Wall thickness was normal.      Systolic function was mildly reduced. The estimated ejection fraction was      40-45%.  Doppler parameters are consistent with a reversible restrictive      pattern, indicative of decreased left ventricular d extremity: No significant change. Patient continues to have drainage had of his left foot. Patient CTA was reviewed. He appears to have an occlusion of the distal popliteal right at the TP trunk.   Only runoff vessel that I can see is that of a posteri

## 2021-02-04 NOTE — CONSULTS
[address]    PATIENT'S NAME: Vaibhav Risedeidre   ATTENDING PHYSICIAN: LUCRECIA Steward: Catherine Brantley M.D.    PATIENT ACCOUNT#:   [de-identified]    LOCATION:  [address] [place] [address]  MEDICAL RECORD #:   [id number]       DATE OF BIRTH:  [birth year] HISTORY:  The patient says both parents passed away from elderly age. He is still working. PHYSICAL EXAMINATION:    GENERAL:  He currently is awake, alert. He is appropriate. He is in no acute distress.   VITAL SIGNS:  His T-max was 99.2 that I can se mapping done. Have Dr. Willian Reilly to evaluate for cardiac status and also for evaluation of possible future angiogram.  Dr. Kenyetta London of Nephrology placed on consultation for prevention of any further renal injury.   The patient discussed the possibility for re

## 2021-02-04 NOTE — PLAN OF CARE
Pt is A/O X4, glasses at bedside. Room air. SR on tele, EKG completed, showed anteroseptal infarct, pt asymptomatic, MDs aware, no new orders. Lovenox. Voids freely. Denies pain or n/v. Up with standby assist. IVF. IV abx.  Left foot cellulitis wrapped with medications per [facility]  - wound care  - See additional Care Plan goals for specific interventions  Outcome: Progressing  Goal: Patient/Family Short Term Goal  Description: Patient's Short Term Goal:   2/2 NOC: rest, pain control  23 AM: have wound care come b on patient safety including physical limitations  - Instruct pt to call for assistance with activity based on assessment  - Modify environment to reduce risk of injury  - Provide assistive devices as appropriate  - Consider OT/PT consult to assist with str

## 2021-02-04 NOTE — CM/SW NOTE
02/04/21 1400   CM/SW Referral Data   Referral Source Physician   Reason for Referral Discharge planning   Informant Spouse   Patient Info   Patient's Mental Status Alert;Oriented   Patient's [address]   Number of Levels in Home 2   Patient

## 2021-02-04 NOTE — CONSULTS
Assessment and Plan:        COVID-19 infection: Patient is tachypneic and hypoxic with exertion. Feeling very short of breath at times.  -We will begin Decadron, noted that he is on chronic prednisone 10 mg.  6 mg Decadron would be delivering a greater glucocorticoid effect thus we will continue with Decadron for the time being  -Procalcitonin is very low do not suspect bacterial pneumonia  -Trend inflammatory markers  -Currently not hypoxic at rest likely not qualifying for Remdesivir thus will begin with convalescent plasma. Will monitor closely for indication of Remdesivir    Hyponatremia: Patient endorsing poor p.o. intake is already self corrected with IV fluids. We will continue to monitor and continue lactated Ringer's for now. History of severe dermatitis: Chronically managed with CellCept and prednisone. Holding prednisone while giving Decadron. Patient hold cellcept while he has infections, will hold. Hyperlipidemia: Continue statin    CC: COVID-19  HPI: (12 point review of systems completed. Pertinent positives noted. Otherwise ROS is negative) : She is 58-year-old male with past medical history of dermatitis that has been controlled with CellCept and prednisone, hyperlipidemia, GERD. He tested positive for corona virus within the last day or 2 and symptoms continuously progressed at home. His wife states that he was just not turning the corner and became more short of breath and was coughing continuously throughout the night. He was brought to the emergency room he was found to be hypoxic when he would ambulate. Otherwise when he is at rest he is saturating above 90%. He has low-grade fevers and is tachypneic.   He went to [facility] where chest x-ray showed bilateral pneumonia and given the progressive presentation it was recommended he be admitted for further care  PMH:  Per HPI  SHX:    Social History     Tobacco Use    Smoking status: Never Smoker    Smokeless tobacco: Never [facility]  Inpatient Wound Care Contact Note    Alethea Lucero Patient Status:  Inpatient    1950 MRN [id number]   [facility] 5NW-A Attending Akash Morley MD   Hosp Day # 2 PCP LUIS ALFREDO SHAFFER     Attempted to see patient for follo Used   Substance Use Topics    Alcohol use: Yes     Alcohol/week: 1.0 standard drinks     Types: 1 Glasses of wine per week    Drug use: No     FHX: History reviewed. No pertinent family history. Allergies: Allergies   Allergen Reactions    Sulfa Antibiotics      Medications:     sodium chloride 200 mL/hr at 09/19/20 0950    lactated ringers        cetirizine  10 mg Oral Daily    mycophenolate  500 mg Oral Daily    atorvastatin  20 mg Oral Nightly    sodium chloride  20 mL Intravenous Once    enoxaparin  30 mg Subcutaneous Q12H    dexamethasone  6 mg Oral Daily    sodium chloride flush  10 mL Intravenous 2 times per day       Vital Signs:   BP (!) 152/91   Pulse 70   Temp 99.8 °F (37.7 °C) (Oral)   Resp 20   Ht 5' 11\" (1.803 m)   Wt 180 lb (81.6 kg)   SpO2 95%   BMI 25.10 kg/m²    No intake or output data in the 24 hours ending 09/19/20 1625     General:   Resting in bed  HEENT:  normocephalic and atraumatic. No scleral icterus. PERR. Neck: supple. No JVD. No thyromegaly. Lungs: clear to auscultation. No retractions  Cardiac: RRR without murmur. Abdomen: soft. Nontender. Bowel sounds positive. Extremities:  No clubbing, cyanosis, or edema x 4. Vasculature: capillary refill < 3 seconds. Palpable LE pulses bilaterally. Skin:  warm and dry. Psych:  Alert and oriented x3. Affect appropriate  Lymph:  No supraclavicular adenopathy. Neurologic:  No focal deficit. No seizures. Data: (All radiographs, tracings, PFTs, and imaging are personally viewed and interpreted unless otherwise noted).    Recent Results (from the past 24 hour(s))   EKG SOB    Collection Time: 09/19/20  9:43 AM   Result Value Ref Range    Ventricular Rate 80 BPM    Atrial Rate 80 BPM    P-R Interval 162 ms    QRS Duration 96 ms    Q-T Interval 382 ms    QTc Calculation (Bazett) 440 ms    P Axis 61 degrees    R Axis 17 degrees    T Axis 16 degrees   CBC auto differential    Collection Time: 09/19/20  9:45 AM Result Value Ref Range    WBC 8.0 4.8 - 10.8 thou/mm3    RBC 4.45 (L) 4.70 - 6.10 mill/mm3    Hemoglobin 14.2 14.0 - 18.0 gm/dl    Hematocrit 42.5 42.0 - 52.0 %    MCV 95.5 (H) 80.0 - 94.0 fL    MCH 32.0 (H) 27.0 - 31.0 pg    MCHC 33.5 33.0 - 37.0 gm/dl    RDW 12.7 11.5 - 14.5 %    Platelets 722 434 - 929 thou/mm3    MPV 7.3 (L) 7.4 - 10.4 fL   Comprehensive metabolic panel    Collection Time: 09/19/20  9:45 AM   Result Value Ref Range    Glucose 138 (H) 74 - 106 mg/dl    CREATININE 1.0 0.6 - 1.3 mg/dl    BUN 17 7 - 18 mg/dl    Sodium 129 (L) 136 - 145 meq/l    Potassium 3.7 3.5 - 5.1 meq/l    Chloride 92 (L) 98 - 107 meq/l    CO2 27 21 - 32 meq/l    POC CALCIUM 8.5 8.5 - 10.1 mg/dl    AST 58 (H) 15 - 37 U/L    Alkaline Phosphatase 73 46 - 116 U/L    Total Protein 6.6 6.4 - 8.2 gm/dl    Alb 2.6 (L) 3.4 - 5.0 gm/dl    Total Bilirubin 0.4 0.2 - 1.0 mg/dl    ALT 59 14 - 63 U/L   Sedimentation Rate    Collection Time: 09/19/20  9:45 AM   Result Value Ref Range    Sed Rate 34 (H) 0 - 10 mm/hr   Troponin    Collection Time: 09/19/20  9:45 AM   Result Value Ref Range    Troponin I < 0.017 0.017 - 0.056 ng/ml   Glomerular Filtration Rate, Estimated    Collection Time: 09/19/20  9:45 AM   Result Value Ref Range    GFR, Estimated 78 (A) ml/min/1.73m2   ANION GAP    Collection Time: 09/19/20  9:45 AM   Result Value Ref Range    Anion Gap 10.0 8.0 - 16.0 meq/l   Differential    Collection Time: 09/19/20  9:45 AM   Result Value Ref Range    Seg Neutrophils 91.8 %    Lymphocytes 3.2 %    Monocytes 4.4 %    Eosinophils 0.0 %    Basophils 0.1 %    Immature Granulocytes 0.5 %    Segs Absolute 7.5 1.8 - 7.7 thou/mm3    Lymphocytes Absolute 0.3 (L) 1.0 - 4.8 thou/mm3    Monocytes Absolute 0.4 0.4 - 1.3 thou/mm3    Eosinophils Absolute 0.0 0.0 - 0.4 thou/mm3    Basophils Absolute 0.0 0.0 - 0.1 thou/mm3    Immature Grans (Abs) 0.04 0.00 - 0.07 thou/mm3    nRBC 0 /100 wbc   Lactic Acid [facility] Only)    Collection Time: 09/19/20 11:04 AM   Result Value Ref Range    Lactate 1.20 0.90 - 1.70 mmol/L   CBC Auto Differential    Collection Time: 09/19/20  2:40 PM   Result Value Ref Range    WBC 6.9 4.8 - 10.8 thou/mm3    RBC 4.67 (L) 4.70 - 6.10 mill/mm3    Hemoglobin 14.7 14.0 - 18.0 gm/dl    Hematocrit 43.2 42.0 - 52.0 %    MCV 92.5 80.0 - 94.0 fL    MCH 31.5 26.0 - 33.0 pg    MCHC 34.0 32.2 - 35.5 gm/dl    RDW-CV 12.3 11.5 - 14.5 %    RDW-SD 41.7 35.0 - 45.0 fL    Platelets 778 770 - 722 thou/mm3    MPV 9.2 (L) 9.4 - 12.4 fL    Seg Neutrophils 94.4 %    Lymphocytes 3.0 %    Monocytes 2.2 %    Eosinophils 0.0 %    Basophils 0.0 %    Immature Granulocytes 0.4 %    Segs Absolute 6.5 1.8 - 7.7 thou/mm3    Lymphocytes Absolute 0.2 (L) 1.0 - 4.8 thou/mm3    Monocytes Absolute 0.2 (L) 0.4 - 1.3 thou/mm3    Eosinophils Absolute 0.0 0.0 - 0.4 thou/mm3    Basophils Absolute 0.0 0.0 - 0.1 thou/mm3    Immature Grans (Abs) 0.03 0.00 - 0.07 thou/mm3    nRBC 0 /100 wbc   Comprehensive Metabolic Panel w/ Reflex to MG    Collection Time: 09/19/20  2:40 PM   Result Value Ref Range    Glucose 144 (H) 70 - 108 mg/dL    CREATININE 0.7 0.4 - 1.2 mg/dL    BUN 13 7 - 22 mg/dL    Sodium 134 (L) 135 - 145 meq/L    Potassium reflex Magnesium 4.1 3.5 - 5.2 meq/L    Chloride 97 (L) 98 - 111 meq/L    CO2 26 23 - 33 meq/L    Calcium 8.8 8.5 - 10.5 mg/dL    AST 50 (H) 5 - 40 U/L    Alkaline Phosphatase 80 38 - 126 U/L    Total Protein 6.9 6.1 - 8.0 g/dL    Alb 3.4 (L) 3.5 - 5.1 g/dL    Total Bilirubin 0.4 0.3 - 1.2 mg/dL    ALT 48 11 - 66 U/L   Protime-INR    Collection Time: 09/19/20  2:40 PM   Result Value Ref Range    INR 1.07 0.85 - 1.13   APTT    Collection Time: 09/19/20  2:40 PM   Result Value Ref Range    aPTT 28.7 22.0 - 38.0 seconds   Fibrinogen    Collection Time: 09/19/20  2:40 PM   Result Value Ref Range    Fibrinogen 604 (H) 155 - 475 mg/100ml   Lactate Dehydrogenase    Collection Time: 09/19/20  2:40 PM   Result Value Ref Range     (H) 100 - 190 U/L

## 2021-02-04 NOTE — OPERATIVE REPORT
Operative Note    Patient Name: Zeynep Irizarry    Preoperative Diagnosis: Abscess of left foot [L02.612] osteomyelitis of ankle and foot ([address] [M86.9]    Postoperative Diagnosis: Abscess of left foot [L02.612] osteomyelitis of ankle and foot ([address] [M86.9] were all resected and sent to pathology. Once we got to good healthy clean tissue we decided to do a pulse lavage with 3000 cc of normal saline.   Prior to the irrigation cultures were obtained soft tissue necrotic tissue was sent to path the bone was rese

## 2021-02-04 NOTE — PROGRESS NOTES
INFECTIOUS DISEASE PROGRESS NOTE    Breeshorty Kulkarni Patient Status:  Inpatient    1950 MRN [id number]   [facility] 5NW-A Attending Sen Aguilar MD   Hosp Day # 2 PCP Cecelia Hong PRN **OR** HYDROcodone-acetaminophen (NORCO) 5-325 MG per tab 2 tablet, 2 tablet, Oral, Q4H PRN  •  morphINE sulfate (PF) 2 MG/ML injection 1 mg, 1 mg, Intravenous, Q2H PRN **OR** morphINE sulfate (PF) 2 MG/ML injection 2 mg, 2 mg, Intravenous, Q2H PRN  • 1.0  --   --    TP 7.7  --   --      No results found for: ESRML   No results found for: CRP, HSCRP   Vancomycin Trough (ug/mL)   Date Value   02/04/2021 17.2     Recent Labs   Lab 02/03/21  1730   COLORUR Yellow   CLARITY Clear   SPECGRAVITY 1.016   GLUUR wound.     FINDINGS:  There are postoperative changes from amputation at the left 5th MTP joint with a small amount of possible residual left 5th proximal phalanx.   There is gas surrounding the head of the left 5th metatarsal which may represent infection

## 2021-02-04 NOTE — CONSULTS
[facility]    PATIENT'S NAME: Lester Ramirez   ATTENDING PHYSICIAN: Jarrod Mishra M.D.   [facility]: Lilliam Alonzo M.D.    PATIENT ACCOUNT#:   [de-identified]    LOCATION:  [address] [place] [address]  MEDICAL RECORD #:   [id number]       DATE OF BIRTH:  [birth year] flow going down to the leg may not be adequate for healing. Address and control infection per Dr. Jamaal Schroeder at this time and recommend Infectious Disease consult if not done.     Dictated By Milka Saavedra M.D.  d: 02/03/2021 15:58:15  t: 02/03/2021 16:49:07

## 2021-02-04 NOTE — CONSULTS
[facility] dosing service    Follow-up Pharmacokinetic Consult for Vancomycin Dosing     Nara Avilez is a 65year old patient who is being treated for osteomyelitis. Patient is on day 3 of Vancomycin and is currently receiving 1.25 gm IV Q 12 hours. Blood,peripheral   Result Value Ref Range    Blood Culture Result No Growth 1 Day N/A       Based on the above:    1. Continue Vancomycin at 1.25 gm IVPB Q 12 hours (based on Trough of 17.2 ug/mL and actual body weight of 77.1 kg and renal function)    2.

## 2021-02-04 NOTE — CM/SW NOTE
Referrals for home health sent to residential and to [facility] for infusion.     Ivette Bentley RN,   Phone [phone]

## 2021-02-04 NOTE — PLAN OF CARE
Patient A/O x 4. VSS. Maintaining O2 saturations >92% on RA. Glasses at bedside. No c/o of discomfort or pain overnight r/t left foot cellulitis, wounds. Wet-to-dry dressing C/D/I. No further cardiac symptoms post-abnormal EKG @ 1745. NSR, tele.  Denies N/V Control  Goal: Glucose maintained within prescribed range  Description: INTERVENTIONS:  - Monitor Blood Glucose as ordered  - Assess for signs and symptoms of hyperglycemia and hypoglycemia  - Administer ordered medications to maintain glucose within targe

## 2021-02-04 NOTE — PROGRESS NOTES
[facility]  Progress note     Norah Cardoza Patient Status:  Inpatient    1950 MRN [id number]   [facility] 5NW-A Attending Dheeraj Rivero MD   Hosp Day # 2 PCP LUIS ALFREDO SHAFFER     Chief Complaint: foot infection  Subjective: ilnda neuropathy  4. Suspect CKD, no baseline Cr to compare  1. Monitor gfr  5. HTN, ?related to pain, infection  1. PRN hydralazine  2.  Consider ACE    Quality:  · DVT Prophylaxis: lovenox  · CODE status: Full  · Steinberg: no  · If COVID testing is negative, may d

## 2021-02-04 NOTE — ANESTHESIA PROCEDURE NOTES
Airway  Date/Time: 2/4/2021 5:04 PM  Urgency: elective    Airway not difficult    General Information and Staff    Patient location during procedure: OR  Anesthesiologist: Lulú Kim MD  Performed: anesthesiologist     Indications and Patient Condition

## 2021-02-04 NOTE — PROGRESS NOTES
[facility]                                                            Progress Note    Alethea Lucero Patient Status:  Inpatient    1950 MRN [id number]   [facility] 5NW-A Attending Akash Morley MD   Hosp Day # 2 PCP Jazlyn Roa tablet, 1 tablet, Oral, Q4H PRN **OR** HYDROcodone-acetaminophen (NORCO) 5-325 MG per tab 2 tablet, 2 tablet, Oral, Q4H PRN  •  morphINE sulfate (PF) 2 MG/ML injection 1 mg, 1 mg, Intravenous, Q2H PRN **OR** morphINE sulfate (PF) 2 MG/ML injection 2 mg, 2 amputation of the 5th toe from the level of the proximal phalanx.   Along the surgical bed there is evidence of skin ulceration with soft tissue air, edema and inflammatory change that extends to the 5th metatarsal head and distal   metaphysis where there i and popliteal arteries were performed.        PATIENT STATED HISTORY: (As transcribed by Technologist)           FINDINGS:    LEFT EXTREMITY:  Triphasic waveforms the left common femoral, profunda and superficial femoral along with proximal left popliteal a

## 2021-02-04 NOTE — PROGRESS NOTES
[facility]  Nephrology Progress Note    Josephine Hughes Patient Status:  Inpatient    1950 MRN [id number]   [facility] 5NW-A Attending Maureen Ponce MD   Hosp Day # 2 PCP LUIS ALFREDO SHAFFER       SUBJECTIVE:  Stable this AM, no overnight Polymyxin B Sulfate 1,500,000 Units in sodium chloride 0.9 % 3,000 mL Irrigation, , Irrigation, Once (Intra-Op)    •  metRONIDAZOLE in NaCl (FLAGYL) IVPB premix 500 mg, 500 mg, Intravenous, Q8H    •  0.9% NaCl infusion, , Intravenous, Continuous    •  nestor On vanco per ID.  ongoing multispecialty care     #3. HTN- will follow BP and begin antihypertensives as needed    Questions/concerns were discussed with patient and/or family by bedside.           Chase Stagers  2/4/2021  8:16 AM

## 2021-02-04 NOTE — PLAN OF CARE
Pt is A/O x4, glasses at bedside. VSS. Maintaining O2 sat WNL on room air. SR on tele. Lovenox. 2D ECHO, US legs, CT legs completed, see results. Voids freely. Pt c/o lower back pain, lidocaine patch applied. Pt c/o nausea, PRN zofran given with relief.  IV hyperglycemia and hypoglycemia  - Administer ordered medications to maintain glucose within target range  - Assess barriers to adequate nutritional intake and initiate nutrition consult as needed  - Instruct patient on self management of diabetes  Outcome:

## 2021-02-05 ENCOUNTER — APPOINTMENT (OUTPATIENT)
Dept: GENERAL RADIOLOGY | Facility: HOSPITAL | Age: 71
DRG: 252 | End: 2021-02-05
Attending: PODIATRIST
Payer: MEDICARE

## 2021-02-05 ENCOUNTER — APPOINTMENT (OUTPATIENT)
Dept: INTERVENTIONAL RADIOLOGY/VASCULAR | Facility: HOSPITAL | Age: 71
DRG: 252 | End: 2021-02-05
Attending: PODIATRIST
Payer: MEDICARE

## 2021-02-05 LAB
ANION GAP SERPL CALC-SCNC: 5 MMOL/L (ref 0–18)
BASOPHILS # BLD AUTO: 0.05 X10(3) UL (ref 0–0.2)
BASOPHILS NFR BLD AUTO: 0.4 %
BUN BLD-MCNC: 25 MG/DL (ref 7–18)
BUN/CREAT SERPL: 16.3 (ref 10–20)
CALCIUM BLD-MCNC: 7.7 MG/DL (ref 8.5–10.1)
CHLORIDE SERPL-SCNC: 107 MMOL/L (ref 98–112)
CO2 SERPL-SCNC: 25 MMOL/L (ref 21–32)
CREAT BLD-MCNC: 1.53 MG/DL
DEPRECATED RDW RBC AUTO: 45.7 FL (ref 35.1–46.3)
EOSINOPHIL # BLD AUTO: 0.15 X10(3) UL (ref 0–0.7)
EOSINOPHIL NFR BLD AUTO: 1.1 %
ERYTHROCYTE [DISTWIDTH] IN BLOOD BY AUTOMATED COUNT: 15.2 % (ref 11–15)
GLUCOSE BLD-MCNC: 138 MG/DL (ref 70–99)
GLUCOSE BLD-MCNC: 146 MG/DL (ref 70–99)
GLUCOSE BLD-MCNC: 154 MG/DL (ref 70–99)
GLUCOSE BLD-MCNC: 176 MG/DL (ref 70–99)
GLUCOSE BLD-MCNC: 185 MG/DL (ref 70–99)
HCT VFR BLD AUTO: 28.2 %
HGB BLD-MCNC: 8.6 G/DL
IMM GRANULOCYTES # BLD AUTO: 0.17 X10(3) UL (ref 0–1)
IMM GRANULOCYTES NFR BLD: 1.2 %
LYMPHOCYTES # BLD AUTO: 1.66 X10(3) UL (ref 1–4)
LYMPHOCYTES NFR BLD AUTO: 11.8 %
MCH RBC QN AUTO: 25.7 PG (ref 26–34)
MCHC RBC AUTO-ENTMCNC: 30.5 G/DL (ref 31–37)
MCV RBC AUTO: 84.2 FL
MONOCYTES # BLD AUTO: 1.26 X10(3) UL (ref 0.1–1)
MONOCYTES NFR BLD AUTO: 9 %
NEUTROPHILS # BLD AUTO: 10.73 X10 (3) UL (ref 1.5–7.7)
NEUTROPHILS # BLD AUTO: 10.73 X10(3) UL (ref 1.5–7.7)
NEUTROPHILS NFR BLD AUTO: 76.5 %
OSMOLALITY SERPL CALC.SUM OF ELEC: 291 MOSM/KG (ref 275–295)
PLATELET # BLD AUTO: 289 10(3)UL (ref 150–450)
POTASSIUM SERPL-SCNC: 4.8 MMOL/L (ref 3.5–5.1)
RBC # BLD AUTO: 3.35 X10(6)UL
SODIUM SERPL-SCNC: 137 MMOL/L (ref 136–145)
WBC # BLD AUTO: 14 X10(3) UL (ref 4–11)

## 2021-02-05 PROCEDURE — 99232 SBSQ HOSP IP/OBS MODERATE 35: CPT | Performed by: HOSPITALIST

## 2021-02-05 PROCEDURE — 99232 SBSQ HOSP IP/OBS MODERATE 35: CPT | Performed by: NURSE PRACTITIONER

## 2021-02-05 PROCEDURE — 99232 SBSQ HOSP IP/OBS MODERATE 35: CPT | Performed by: INTERNAL MEDICINE

## 2021-02-05 PROCEDURE — 73630 X-RAY EXAM OF FOOT: CPT | Performed by: PODIATRIST

## 2021-02-05 NOTE — CONSULTS
[facility]  Report of Inpatient Wound Care Consultation     Jannpatricia Simpson Patient Status:  Inpatient    1950 MRN [id number]   [facility] 5NW-A Attending Anuja Baron MD   Hosp Day # 3 PCP [address] Roberts Aamir: --   --   --   --    ESRML  --   --   --   --   --   --  104*  --   --   --   --   --    CRP  --   --   --   --  9.95*  --   --   --   --   --   --   --    PGLU  --    < >  --    < >  --    < >  --    < > 241*  --  146* 176*    < > = values in this interv [facility] [address]  [place], [address]  [phone]  Inpatient Wound Care Pager #[phone]

## 2021-02-05 NOTE — PROGRESS NOTES
[facility]  Cardiology  Progress Note    Lea Warner Patient Status:  Inpatient    1950 MRN [id number]   [facility] 5NW-A Attending Ambrocio Rodriguez MD   Hosp Day # 3 PCP LUIS ALFREDO SHAFFER       Subjective:  No new complaints.      Mara Kaye estimated ejection fraction was      40-45%.  Doppler parameters are consistent with a reversible restrictive      pattern, indicative of decreased left ventricular diastolic compliance      and/or increased left atrial pressure - grade 3 diastolic dysfunct NP  2/5/2021  1:49 PM  [phone]

## 2021-02-05 NOTE — HOME CARE LIAISON
TNL second visit with ptnt and spouse at bedside. Ptnt asked that TNL come back to discuss [place] when his wife was here. TNL spoke with ptnt and spouse about the care [facility] can provide in the home on dc. Ptnt and wife are determined to go out for services.  C

## 2021-02-05 NOTE — PROGRESS NOTES
[facility]  Progress note     Jannpatricia Simpson Patient Status:  Inpatient    1950 MRN [id number]   [facility] 5NW-A Attending Chetan Bojorquez MD   Hosp Day # 3 PCP LUIS ALFREDO SHAFFER     Chief Complaint: foot infection  Subjective: linda ID  2. Angiogram planned for Monday  3. Pod#1 s/p  Incision and debridement of infected necrotic tissue with drainage of abscess and resection of osteomyelitic fifth metatarsal bone left foot  2. DMII, hyperglycemia protocol-monitor on insulin  3.  Diabetic

## 2021-02-05 NOTE — PROGRESS NOTES
INFECTIOUS DISEASE PROGRESS NOTE    Zacarias Camara Patient Status:  Inpatient    1950 MRN [id number]   [facility] 5NW-A Attending Viridiana Baker MD   Hosp Day # 3 PCP Ambar Fallon Intravenous, Q6H PRN  •  Metoclopramide HCl (REGLAN) injection 10 mg, 10 mg, Intravenous, Q8H PRN    Review of Systems:  Completed. See pertinent positives and negatives above    Physical Exam:    General: No acute distress. Alert and oriented x 3.   Vital BILUR Negative   KETUR Negative   BLOODURINE Small*   PHURINE 7.0   PROUR 100 *   UROBILINOGEN <2.0   NITRITE Negative   LEUUR Negative   WBCUR 1-4   RBCUR 3-5*   BACUR None Seen   EPIUR None Seen        Microbiology    Reviewed in [address] [place] [address] atherosclerotic plaque along the internal iliac arteries without hemodynamically significant narrowing. The right common femoral artery and right deep femoral artery are widely patent.  There is scattered mild to moderate mixed irregular atherosclerotic entirely   excluded. Clinical correlation recommended. Scattered feces in the partially imaged colon. No large or small bowel dilatation. The partially imaged kidneys, partially imaged lower liver are unremarkable. Mild urinary bladder distention.  Pelv planes and parameters were utilized for visualization of suspected pathology. Images were performed without contrast.     PATIENT STATED HISTORY: (As transcribed by Technologist) Evaluate for osteomyelitis, wound to lateral aspect of left foot.      FINDING CRP and ESR.  c. S/p I&D with drainage of abscess and resection of L 5th MT 2/4  2. LLE cellulitis due to above  3. Type 2 DM  4. Peripheral neuropathy  5. PVD- vascular following  6.  Renal failure- suspect CKD but no baseline labs available, renal followi

## 2021-02-05 NOTE — PLAN OF CARE
Problem: Patient/Family Goals  Goal: Patient/Family Long Term Goal  Description: Patient's Long Term Goal:     Become wound/infection free    Interventions:  - medications per [facility]  - wound care  - See additional Care Plan goals for specific interventions

## 2021-02-05 NOTE — PROGRESS NOTES
[facility]  Progress Note    Domenic Palma Patient Status:  Inpatient    1950 MRN [id number]   [facility] 5NW-A Attending Isela Keith MD   Hosp Day # 3 PCP LUIS ALFREDO SHAFFER     SUBJECTIVE:  Interval History: Patient has no current

## 2021-02-05 NOTE — PROGRESS NOTES
1700- Received pt from OR, pt is A/O x4, great spirits, super happy and feels great. VSS. Pt is maintaining O2 sat WNL on room air. Denies pain, able to wiggle toes. Dressing to left foot is c/d/i. Pt ordered dinner. Wife is at bedside.  Pt and wife updated

## 2021-02-05 NOTE — CM/SW NOTE
Care Progression Note:  Active Acute Medical Issue:   Diabetic foot infection ([facility] [address]   Left foot cellulitis and osteomyelitis, DFU and PVD    Other Contributing Medical Factors/Dx.: DM, CKD    Length of stay: 3  GMLOS: 2.9  Avoidable Delays: none  Discharge

## 2021-02-05 NOTE — PROGRESS NOTES
PT AOx4, VSS reported L foot pain- relieved with PRN pain medication. Received pt on RA, had to place 3L NC during night to maintain SPO2>90. Decreased to 1L by morning. Tele NSR, denies chest pain edema noted to lower extremities.  Dressing to left foot S/

## 2021-02-05 NOTE — PROGRESS NOTES
02/04/21 1949   Provider Notification   Reason for Communication Med Rec; Review case;Order clarification; Other (comment)   Provider Name Rk Phillip MD   Method of Communication Call   Response See orders   Notification Time 1950   Dr. Cruz Plush paged a

## 2021-02-05 NOTE — PLAN OF CARE
Patient Aox4. Glasses. VSS. Patient maintaining O2 sats on RA throughout the day. Patient NSR on tele. Will receive lovenox tonight. IV abx. Last BM 2/3. Utilizes urinal. Patient requesting PRN pain medication.  Left foot wound c/d and redressed with a woun maintained within prescribed range  Description: INTERVENTIONS:  - Monitor Blood Glucose as ordered  - Assess for signs and symptoms of hyperglycemia and hypoglycemia  - Administer ordered medications to maintain glucose within target range  - Assess barri

## 2021-02-05 NOTE — ANESTHESIA POSTPROCEDURE EVALUATION
[facility]    Estephania Montoyas Patient Status:  Inpatient   Age/Gender 65year old male MRN [id number]   Location [address] [facility] Attending Pastor Love MD   Hosp Day # 2 PCP LUIS ALFREDO SHAFFER       Anesthesia Post-op Note    Proced

## 2021-02-05 NOTE — PROGRESS NOTES
[facility]  Nephrology Progress Note    Julio Bundy Patient Status:  Inpatient    1950 MRN [id number]   [facility] 5NW-A Attending Chari Durant MD   Hosp Day # 3 PCP LUIS ALFREDO SHAFFER       SUBJECTIVE:  Stable today, chaz coleman chloride 0.9% 100 mL MBP/ADD-vantage, 3 g, Intravenous, Q12H    •  lidocaine-menthol 4-1 % 1 patch, 1 patch, Transdermal, Daily PRN    •  Metoprolol Succinate ER (Toprol XL) 24 hr tab 25 mg, 25 mg, Oral, Daily Beta Blocker    •  atorvastatin (LIPITOR) tab or so at most.  Renal fxn stable today. Given need for contrast again on Monday will give NS overnight Sunday.     #2. L foot diabetic ulcer/abscess- noted to have staph and strep species. On vanco per ID.  ongoing multispecialty care     #3.   HTN- will

## 2021-02-05 NOTE — PROGRESS NOTES
Pharmacy Note: Renal dose adjustment of Unasyn    Lea Warner is a 65year old male who has been prescribed Unasyn 3 g IV every 8 hours.   CrCl is 42 ml/min so the dose has been adjusted  to Unasyn 3 g IV every 12 hours per hospital renal dose adjustment

## 2021-02-06 LAB
ANION GAP SERPL CALC-SCNC: 8 MMOL/L (ref 0–18)
BASOPHILS # BLD AUTO: 0.05 X10(3) UL (ref 0–0.2)
BASOPHILS NFR BLD AUTO: 0.4 %
BUN BLD-MCNC: 26 MG/DL (ref 7–18)
BUN/CREAT SERPL: 17.2 (ref 10–20)
CALCIUM BLD-MCNC: 8 MG/DL (ref 8.5–10.1)
CHLORIDE SERPL-SCNC: 103 MMOL/L (ref 98–112)
CO2 SERPL-SCNC: 23 MMOL/L (ref 21–32)
CREAT BLD-MCNC: 1.51 MG/DL
DEPRECATED RDW RBC AUTO: 47.1 FL (ref 35.1–46.3)
EOSINOPHIL # BLD AUTO: 0.25 X10(3) UL (ref 0–0.7)
EOSINOPHIL NFR BLD AUTO: 2.2 %
ERYTHROCYTE [DISTWIDTH] IN BLOOD BY AUTOMATED COUNT: 15.6 % (ref 11–15)
GLUCOSE BLD-MCNC: 125 MG/DL (ref 70–99)
GLUCOSE BLD-MCNC: 128 MG/DL (ref 70–99)
GLUCOSE BLD-MCNC: 178 MG/DL (ref 70–99)
GLUCOSE BLD-MCNC: 191 MG/DL (ref 70–99)
GLUCOSE BLD-MCNC: 210 MG/DL (ref 70–99)
HCT VFR BLD AUTO: 28.5 %
HGB BLD-MCNC: 8.7 G/DL
IMM GRANULOCYTES # BLD AUTO: 0.15 X10(3) UL (ref 0–1)
IMM GRANULOCYTES NFR BLD: 1.3 %
LYMPHOCYTES # BLD AUTO: 1.88 X10(3) UL (ref 1–4)
LYMPHOCYTES NFR BLD AUTO: 16.3 %
MCH RBC QN AUTO: 26 PG (ref 26–34)
MCHC RBC AUTO-ENTMCNC: 30.5 G/DL (ref 31–37)
MCV RBC AUTO: 85.3 FL
MONOCYTES # BLD AUTO: 1.07 X10(3) UL (ref 0.1–1)
MONOCYTES NFR BLD AUTO: 9.3 %
NEUTROPHILS # BLD AUTO: 8.1 X10 (3) UL (ref 1.5–7.7)
NEUTROPHILS # BLD AUTO: 8.1 X10(3) UL (ref 1.5–7.7)
NEUTROPHILS NFR BLD AUTO: 70.5 %
OSMOLALITY SERPL CALC.SUM OF ELEC: 284 MOSM/KG (ref 275–295)
PLATELET # BLD AUTO: 267 10(3)UL (ref 150–450)
POTASSIUM SERPL-SCNC: 4.3 MMOL/L (ref 3.5–5.1)
RBC # BLD AUTO: 3.34 X10(6)UL
SODIUM SERPL-SCNC: 134 MMOL/L (ref 136–145)
WBC # BLD AUTO: 11.5 X10(3) UL (ref 4–11)

## 2021-02-06 PROCEDURE — 99232 SBSQ HOSP IP/OBS MODERATE 35: CPT | Performed by: HOSPITALIST

## 2021-02-06 PROCEDURE — 99232 SBSQ HOSP IP/OBS MODERATE 35: CPT | Performed by: INTERNAL MEDICINE

## 2021-02-06 NOTE — PROGRESS NOTES
[facility]  Progress note     Dinaalberto Simpson Patient Status:  Inpatient    1950 MRN [id number]   [facility] 5NW-A Attending Chetan Bojorquez MD   Hosp Day # 4 PCP LUIS ALFREDO SHAFFER     Chief Complaint: foot infection  Subjective: linda Unasyn per ID  2. Angiogram planned for Monday  3. Pod#2 s/p  Incision and debridement of infected necrotic tissue with drainage of abscess and resection of osteomyelitic fifth metatarsal bone left foot  2.  DMII, hyperglycemia protocol-monitor on insulin

## 2021-02-06 NOTE — PROGRESS NOTES
Assumed care @5661. Pt AOX4. VSS on RA. No complaints of pain. NWB L foot. Dressing c/d/i, wound vac in place with no complications. Wife at bedside. Updated on POC. All questions answered.

## 2021-02-06 NOTE — PROGRESS NOTES
[facility]  Progress Note    Eufemai Page Patient Status:  Inpatient    1950 MRN [id number]   [facility] 5NW-A Attending Semaj Jones MD   Hosp Day # 4 PCP LUIS ALFREDO SHAFFER     SUBJECTIVE:  Interval History: Patient has no current

## 2021-02-06 NOTE — PROGRESS NOTES
[facility]  Nephrology Progress Note    Kerline Rajput Patient Status:  Inpatient    1950 MRN [id number]   [facility] 5NW-A Attending Laureen Gomez MD   Hosp Day # 4 PCP LUIS ALFREDO SHAFFER       SUBJECTIVE:  Wound vac placed yest.  Stab 02/05/21 2038 02/06/21  0758   PGLU 146* 176* 154* 185* 128*       Meds:     •  Ampicillin-Sulbactam Sodium (UNASYN) 3 g in sodium chloride 0.9% 100 mL MBP/ADD-vantage, 3 g, Intravenous, Q12H    •  lidocaine-menthol 4-1 % 1 patch, 1 patch, Transdermal, Da nephropathy. Regarding risk of contrast nephropathy he is certainly at acceptable risk with likelihood of HERBERT on the order of 1-5% or so at most.  Tolerated contrast without incident as renal fxn remains stable today.  Given need for contrast again on [facility]

## 2021-02-06 NOTE — PROGRESS NOTES
PT consult received and appreciated. Per EMR, pt is s/p L foot I&D with partial amputation. WB status of L LE not listed and pt with bedrest orders in chart.  Will hold PT evaluation until further clarification of WB status of L LE and bedrest orders remove

## 2021-02-07 ENCOUNTER — APPOINTMENT (OUTPATIENT)
Dept: ULTRASOUND IMAGING | Facility: HOSPITAL | Age: 71
DRG: 252 | End: 2021-02-07
Attending: HOSPITALIST
Payer: MEDICARE

## 2021-02-07 LAB
ANION GAP SERPL CALC-SCNC: 7 MMOL/L (ref 0–18)
BASOPHILS # BLD AUTO: 0.04 X10(3) UL (ref 0–0.2)
BASOPHILS NFR BLD AUTO: 0.3 %
BUN BLD-MCNC: 23 MG/DL (ref 7–18)
BUN/CREAT SERPL: 17.3 (ref 10–20)
CALCIUM BLD-MCNC: 7.9 MG/DL (ref 8.5–10.1)
CHLORIDE SERPL-SCNC: 104 MMOL/L (ref 98–112)
CO2 SERPL-SCNC: 22 MMOL/L (ref 21–32)
CREAT BLD-MCNC: 1.33 MG/DL
DEPRECATED RDW RBC AUTO: 46 FL (ref 35.1–46.3)
EOSINOPHIL # BLD AUTO: 0.27 X10(3) UL (ref 0–0.7)
EOSINOPHIL NFR BLD AUTO: 2.4 %
ERYTHROCYTE [DISTWIDTH] IN BLOOD BY AUTOMATED COUNT: 15.7 % (ref 11–15)
GLUCOSE BLD-MCNC: 155 MG/DL (ref 70–99)
GLUCOSE BLD-MCNC: 171 MG/DL (ref 70–99)
GLUCOSE BLD-MCNC: 202 MG/DL (ref 70–99)
GLUCOSE BLD-MCNC: 205 MG/DL (ref 70–99)
GLUCOSE BLD-MCNC: 252 MG/DL (ref 70–99)
HCT VFR BLD AUTO: 29.4 %
HGB BLD-MCNC: 9.1 G/DL
IMM GRANULOCYTES # BLD AUTO: 0.13 X10(3) UL (ref 0–1)
IMM GRANULOCYTES NFR BLD: 1.1 %
LYMPHOCYTES # BLD AUTO: 1.74 X10(3) UL (ref 1–4)
LYMPHOCYTES NFR BLD AUTO: 15.2 %
MCH RBC QN AUTO: 26 PG (ref 26–34)
MCHC RBC AUTO-ENTMCNC: 31 G/DL (ref 31–37)
MCV RBC AUTO: 84 FL
MONOCYTES # BLD AUTO: 0.94 X10(3) UL (ref 0.1–1)
MONOCYTES NFR BLD AUTO: 8.2 %
NEUTROPHILS # BLD AUTO: 8.35 X10 (3) UL (ref 1.5–7.7)
NEUTROPHILS # BLD AUTO: 8.35 X10(3) UL (ref 1.5–7.7)
NEUTROPHILS NFR BLD AUTO: 72.8 %
OSMOLALITY SERPL CALC.SUM OF ELEC: 284 MOSM/KG (ref 275–295)
PLATELET # BLD AUTO: 272 10(3)UL (ref 150–450)
POTASSIUM SERPL-SCNC: 4.5 MMOL/L (ref 3.5–5.1)
RBC # BLD AUTO: 3.5 X10(6)UL
SARS-COV-2 RNA RESP QL NAA+PROBE: NOT DETECTED
SODIUM SERPL-SCNC: 133 MMOL/L (ref 136–145)
WBC # BLD AUTO: 11.5 X10(3) UL (ref 4–11)

## 2021-02-07 PROCEDURE — 93971 EXTREMITY STUDY: CPT | Performed by: HOSPITALIST

## 2021-02-07 PROCEDURE — 99232 SBSQ HOSP IP/OBS MODERATE 35: CPT | Performed by: HOSPITALIST

## 2021-02-07 PROCEDURE — 99232 SBSQ HOSP IP/OBS MODERATE 35: CPT | Performed by: INTERNAL MEDICINE

## 2021-02-07 RX ORDER — HYDRALAZINE HYDROCHLORIDE 20 MG/ML
5 INJECTION INTRAMUSCULAR; INTRAVENOUS EVERY 6 HOURS PRN
Status: DISCONTINUED | OUTPATIENT
Start: 2021-02-07 | End: 2021-02-20

## 2021-02-07 RX ORDER — SODIUM CHLORIDE 9 MG/ML
INJECTION, SOLUTION INTRAVENOUS CONTINUOUS
Status: ACTIVE | OUTPATIENT
Start: 2021-02-07 | End: 2021-02-08

## 2021-02-07 RX ORDER — TEMAZEPAM 15 MG/1
15 CAPSULE ORAL NIGHTLY
Status: DISCONTINUED | OUTPATIENT
Start: 2021-02-07 | End: 2021-02-20

## 2021-02-07 RX ORDER — SODIUM CHLORIDE 9 MG/ML
INJECTION, SOLUTION INTRAVENOUS CONTINUOUS
Status: DISCONTINUED | OUTPATIENT
Start: 2021-02-07 | End: 2021-02-10

## 2021-02-07 NOTE — PLAN OF CARE
Problem: Patient/Family Goals  Goal: Patient/Family Short Term Goal  Description: Patient's Short Term Goal:   2/2 NOC: rest, pain control  2/3 AM: have wound care come by to eval foot  2/3 NOC: rest for procedures tomorrow  2/4 AM: get procedure done to

## 2021-02-07 NOTE — PLAN OF CARE
Pt is A/O x4, glasses. Elevated BP, MD aware, see mar. Maintaining O2 sat WNL on room air. Afebrile. NSR on tele. AC on hold for scheduled angiogram on 2/8. Voids freely. Pt last BM 2/3, PRN medication given. NWB on left foot.  US venous doppler of right a Encourage toileting schedule  Outcome: Progressing     Problem: Diabetes/Glucose Control  Goal: Glucose maintained within prescribed range  Description: INTERVENTIONS:  - Monitor Blood Glucose as ordered  - Assess for signs and symptoms of hyperglycemia an

## 2021-02-07 NOTE — PROGRESS NOTES
[facility]  Nephrology Progress Note    Clif Velazquez Patient Status:  Inpatient    1950 MRN [id number]   [facility] 5NW-A Attending Soraya Hanks MD   Hosp Day # 5 PCP LUIS ALFREDO SHAFFER       SUBJECTIVE:  Stable this afternoon, no ac AST 32   ALB 2.3*       Recent Labs   Lab 02/06/21  1216 02/06/21  1653 02/06/21  2105 02/07/21  0734 02/07/21  1243   PGLU 178* 191* 210* 155* 252*       Meds:     •  0.9% NaCl infusion, , Intravenous, Continuous    •  temazepam (RESTORIL) cap 15 mg, 15 Q2H PRN    •  ondansetron HCl (ZOFRAN) injection 4 mg, 4 mg, Intravenous, Q6H PRN    •  Metoclopramide HCl (REGLAN) injection 10 mg, 10 mg, Intravenous, Q8H PRN          Impression/Plan:    #1.   CKD III- pt reports creat was 1.48 mg/dl one year ago and thi

## 2021-02-07 NOTE — PROGRESS NOTES
Superficial thrombophlebitis but only mild edema without much pain. Warm packs for now. After angiogram, will reassess and consider heme consult or anticoagulation if still symptomatic.

## 2021-02-07 NOTE — CM/SW NOTE
Order for wound vac sent via aidin to [facility]. Will need to send paper order form on chart once signed by physician.     Elian Singh RN,   Phone [phone]

## 2021-02-07 NOTE — PROGRESS NOTES
[facility]  Progress note     Taylor Degroot Patient Status:  Inpatient    1950 MRN [id number]   [facility] 5NW-A Attending Rk Phillip MD   Hosp Day # 5 PCP LUIS ALFREDO SHAFFER     Chief Complaint: foot infection  Subjective: linda INR in the last 168 hours. No results for input(s): TROP, CK in the last 168 hours. Imaging: Imaging data reviewed in Epic. ASSESSMENT / PLAN:     1. Left foot cellulitis and osteomyelitis, DFU and PVD  1. IV Unasyn per ID  2.  Angiogram planned

## 2021-02-08 ENCOUNTER — APPOINTMENT (OUTPATIENT)
Dept: INTERVENTIONAL RADIOLOGY/VASCULAR | Facility: HOSPITAL | Age: 71
DRG: 252 | End: 2021-02-08
Attending: PODIATRIST
Payer: MEDICARE

## 2021-02-08 LAB
ANION GAP SERPL CALC-SCNC: 5 MMOL/L (ref 0–18)
BUN BLD-MCNC: 18 MG/DL (ref 7–18)
BUN/CREAT SERPL: 15.5 (ref 10–20)
CALCIUM BLD-MCNC: 7.9 MG/DL (ref 8.5–10.1)
CHLORIDE SERPL-SCNC: 105 MMOL/L (ref 98–112)
CO2 SERPL-SCNC: 24 MMOL/L (ref 21–32)
CREAT BLD-MCNC: 1.16 MG/DL
GLUCOSE BLD-MCNC: 127 MG/DL (ref 70–99)
GLUCOSE BLD-MCNC: 142 MG/DL (ref 70–99)
GLUCOSE BLD-MCNC: 208 MG/DL (ref 70–99)
GLUCOSE BLD-MCNC: 258 MG/DL (ref 70–99)
OSMOLALITY SERPL CALC.SUM OF ELEC: 282 MOSM/KG (ref 275–295)
POTASSIUM SERPL-SCNC: 4.1 MMOL/L (ref 3.5–5.1)
SODIUM SERPL-SCNC: 134 MMOL/L (ref 136–145)

## 2021-02-08 PROCEDURE — 99232 SBSQ HOSP IP/OBS MODERATE 35: CPT | Performed by: INTERNAL MEDICINE

## 2021-02-08 PROCEDURE — 99152 MOD SED SAME PHYS/QHP 5/>YRS: CPT | Performed by: INTERNAL MEDICINE

## 2021-02-08 PROCEDURE — 75710 ARTERY X-RAYS ARM/LEG: CPT | Performed by: INTERNAL MEDICINE

## 2021-02-08 PROCEDURE — B41GYZZ FLUOROSCOPY OF LEFT LOWER EXTREMITY ARTERIES USING OTHER CONTRAST: ICD-10-PCS | Performed by: INTERNAL MEDICINE

## 2021-02-08 PROCEDURE — 75774 ARTERY X-RAY EACH VESSEL: CPT | Performed by: INTERNAL MEDICINE

## 2021-02-08 PROCEDURE — 99232 SBSQ HOSP IP/OBS MODERATE 35: CPT | Performed by: HOSPITALIST

## 2021-02-08 PROCEDURE — 36247 INS CATH ABD/L-EXT ART 3RD: CPT | Performed by: INTERNAL MEDICINE

## 2021-02-08 RX ORDER — HEPARIN SODIUM 5000 [USP'U]/ML
INJECTION, SOLUTION INTRAVENOUS; SUBCUTANEOUS
Status: COMPLETED
Start: 2021-02-08 | End: 2021-02-08

## 2021-02-08 RX ORDER — HYDRALAZINE HYDROCHLORIDE 20 MG/ML
INJECTION INTRAMUSCULAR; INTRAVENOUS
Status: COMPLETED
Start: 2021-02-08 | End: 2021-02-08

## 2021-02-08 RX ORDER — MIDAZOLAM HYDROCHLORIDE 1 MG/ML
INJECTION INTRAMUSCULAR; INTRAVENOUS
Status: COMPLETED
Start: 2021-02-08 | End: 2021-02-08

## 2021-02-08 RX ORDER — LIDOCAINE HYDROCHLORIDE 10 MG/ML
INJECTION, SOLUTION EPIDURAL; INFILTRATION; INTRACAUDAL; PERINEURAL
Status: COMPLETED
Start: 2021-02-08 | End: 2021-02-08

## 2021-02-08 NOTE — PROCEDURES
[facility]    PATIENT'S NAME: Gita Negron   ATTENDING PHYSICIAN: Yuki Hess M.D. OPERATING PHYSICIAN: Filiberto Sousa M.D.    PATIENT ACCOUNT#:   [de-identified]    LOCATION:  [address] [place] [address]  MEDICAL RECORD #:   [id number]       DATE OF BIRTH: proximal SFA. Further injections were done with a Glide catheter in this position. The popliteal segment just below the knee is occluded. What appears to be the takeoff of the anterior tibial is occluded very proximally. Multiple collaterals are noted.

## 2021-02-08 NOTE — WOUND PROGRESS NOTE
[facility]  Report of Inpatient Wound Care Consultation    Zacarias Camara Patient Status:  Inpatient    1950 MRN [id number]   [facility] 7NE-A Attending Viridiana Baker MD   Hosp Day # 6  [facility]     Reason for Consultation: participate in the care of your patient. Time Spent 40 minutes, Thank you.     Cheyanne Rodríguez RN, BSN [address] [place] [address],[address]   Wound Care pager [phone]  2/8/2021  4:12 PM

## 2021-02-08 NOTE — CONSULTS
[address]    PATIENT'S NAME: Lai Quiles   ATTENDING PHYSICIAN: LUCRECIA Mendez: Joao Everett M.D.    PATIENT ACCOUNT#:   [de-identified]    LOCATION:  [address] [place] [address]  MEDICAL RECORD #:   [id number]       DATE OF BIRTH:  [birth year] M.D.

## 2021-02-08 NOTE — PLAN OF CARE
Assumed care of pt. At 2330. Pt resting in bed, no complaints at this time. Call light within reach, pt. Able to use. Aox4. Wears glasses - confirmed at bedside. RA sats upper 90s. No isolation at this time. Encourage IS use. NSR on tele.  BP elevated at Encourage toileting schedule  Outcome: Progressing     Problem: Diabetes/Glucose Control  Goal: Glucose maintained within prescribed range  Description: INTERVENTIONS:  - Monitor Blood Glucose as ordered  - Assess for signs and symptoms of hyperglycemia an

## 2021-02-08 NOTE — PROGRESS NOTES
INFECTIOUS DISEASE PROGRESS NOTE    Delinda Angelucci Patient Status:  Inpatient    1950 MRN [id number]   [facility] 5NW-A Attending Toya Silver MD   Hosp Day # 6 PCP Rocio Pritchard Oral, Q4H PRN **OR** HYDROcodone-acetaminophen (NORCO) 5-325 MG per tab 1 tablet, 1 tablet, Oral, Q4H PRN **OR** HYDROcodone-acetaminophen (NORCO) 5-325 MG per tab 2 tablet, 2 tablet, Oral, Q4H PRN  •  morphINE sulfate (PF) 2 MG/ML injection 1 mg, 1 mg, In *   < > 134* 133* 134*   K 4.0   < > 4.3 4.5 4.1      < > 103 104 105   CO2 29.0   < > 23.0 22.0 24.0   ALKPHO 327*  --   --   --   --    AST 32  --   --   --   --    ALT 31  --   --   --   --    BILT 1.0  --   --   --   --    TP 7.7  --   - Specimen: Blood,peripheral   Result Value Ref Range    Blood Culture Result No Growth 5 Days N/A         Radiology:     PROCEDURE: CTA LOWER EXTREMITY BILAT (CPT=73706)     COMPARISON: [facility] , MR, MRI FOOT, LEFT (CPT=73718), 2/03/2021, 2:43 PM.     Cecilio Zaldivar of right peroneal artery and right posterior   tibial artery by collaterals. Left common femoral artery is widely patent. There is minimal mixed plaque in the left deep femoral artery without hemodynamically significant narrowing.  There is mild to mode along with minimal portions of the left peroneal artery which is seen down to the level of the left ankle. 2. The origin of the right anterior tibial artery is likely occluded with reconstitution with collaterals in this region.  The right anterior tibi distal metaphysis. No additional regions of osteomyelitis suggested.      There is an additional focus of soft tissue ulceration along the dorsum of the foot seen dorsal to the region of the 3rd and 4th tarsometatarsal joints where there is soft tissue air Recs as noted above.   D/w staff and with pt  Will follow     Bernie Walton MD

## 2021-02-08 NOTE — OPERATIVE REPORT
Full note dictated,    100% left pop  Late filling of post tib and ant tib    Case discussed with Dr. Donaldson Situ; felt that PV bypass is better option    Unable to contact pt's wife via phone    Randy Chamberlain MD

## 2021-02-08 NOTE — CM/SW NOTE
Still need MD signature of [facility] wound vac form. Lisa Salazar from [place] [phone] called to say that Dr Jamaal Schroeder is out of the office until next week. Dr Colleen Triana is covering for Dr Jamaal Schroeder. [facility] wound vac form on pt's chart waiting for a signature.   FILI to sharmin

## 2021-02-08 NOTE — PLAN OF CARE
Assumed care at 1000 after angio procedure  Right groin site C/D/I. Soft, no hematoma  Wound vac to left foot C/D/I. SS drainage noted  Plan for stress test tomorrow  Pt and spouse updated on POC.  Needs attended to

## 2021-02-08 NOTE — PLAN OF CARE
Pt alert and oriented, denies any pain or shortness of breath. Wound vac in place. Voided in urinal. Ready to go to cath lab for angyogram of L foot. 8:20 Taken by bed to cath lab.

## 2021-02-08 NOTE — PROGRESS NOTES
[facility]  Progress note     Rafita Lane Patient Status:  Inpatient    1950 MRN [id number]   [facility] 5NW-A Attending Ofe Lomeli MD   Hosp Day # 6 PCP LUIS ALFREDO SHAFFER     Chief Complaint: foot infection  Subjective: linda 48.6 mL/min (A) (based on SCr of 1.33 mg/dL (H)). No results for input(s): PTP, INR in the last 168 hours. No results for input(s): TROP, CK in the last 168 hours. Imaging: Imaging data reviewed in Epic. ASSESSMENT / PLAN:     1.  Left foot ce

## 2021-02-08 NOTE — CONSULTS
[address]    PATIENT'S NAME: Steven Jamil   ATTENDING PHYSICIAN: LUCRECIA Hamlin Dies: Suzi Coates M.D.    PATIENT ACCOUNT#:   [de-identified]    LOCATION:  [address] [place] [address]  MEDICAL RECORD #:   [id number]       DATE OF BIRTH:  [birth year]

## 2021-02-08 NOTE — PROGRESS NOTES
[facility]  Nephrology Progress Note    Bree Kulkarni Patient Status:  Inpatient    1950 MRN [id number]   [facility] 5NW-A Attending Sen Aguilar MD   Hosp Day # 6 PCP LUIS ALFREDO SHAFFER       SUBJECTIVE:  S/p angiogram, stable 02/07/21  0734 02/07/21  1243 02/07/21  1651 02/07/21 2042 02/08/21  0813   PGLU 155* 252* 202* 205* 127*       Meds:     •  insulin detemir (LEVEMIR) 100 UNIT/ML flextouch 5 Units, 5 Units, Subcutaneous, Nightly    •  0.9% NaCl infusion, , Intravenous, C sulfate (PF) 2 MG/ML injection 2 mg, 2 mg, Intravenous, Q2H PRN    •  ondansetron HCl (ZOFRAN) injection 4 mg, 4 mg, Intravenous, Q6H PRN    •  Metoclopramide HCl (REGLAN) injection 10 mg, 10 mg, Intravenous, Q8H PRN          Impression/Plan:    #1.   CKD I

## 2021-02-09 ENCOUNTER — ANESTHESIA EVENT (OUTPATIENT)
Dept: CARDIAC SURGERY | Facility: HOSPITAL | Age: 71
DRG: 252 | End: 2021-02-09
Payer: MEDICARE

## 2021-02-09 ENCOUNTER — APPOINTMENT (OUTPATIENT)
Dept: CV DIAGNOSTICS | Facility: HOSPITAL | Age: 71
DRG: 252 | End: 2021-02-09
Attending: INTERNAL MEDICINE
Payer: MEDICARE

## 2021-02-09 LAB
ANION GAP SERPL CALC-SCNC: 5 MMOL/L (ref 0–18)
ANTIBODY SCREEN: NEGATIVE
BUN BLD-MCNC: 20 MG/DL (ref 7–18)
BUN/CREAT SERPL: 12.9 (ref 10–20)
CALCIUM BLD-MCNC: 8.2 MG/DL (ref 8.5–10.1)
CHLORIDE SERPL-SCNC: 105 MMOL/L (ref 98–112)
CO2 SERPL-SCNC: 26 MMOL/L (ref 21–32)
CREAT BLD-MCNC: 1.55 MG/DL
DEPRECATED HBV CORE AB SER IA-ACNC: 66.3 NG/ML
GLUCOSE BLD-MCNC: 122 MG/DL (ref 70–99)
GLUCOSE BLD-MCNC: 132 MG/DL (ref 70–99)
GLUCOSE BLD-MCNC: 179 MG/DL (ref 70–99)
GLUCOSE BLD-MCNC: 219 MG/DL (ref 70–99)
GLUCOSE BLD-MCNC: 225 MG/DL (ref 70–99)
IRON SATURATION: 9 %
IRON SERPL-MCNC: 23 UG/DL
OSMOLALITY SERPL CALC.SUM OF ELEC: 286 MOSM/KG (ref 275–295)
POTASSIUM SERPL-SCNC: 4.5 MMOL/L (ref 3.5–5.1)
RH BLOOD TYPE: POSITIVE
SODIUM SERPL-SCNC: 136 MMOL/L (ref 136–145)
TOTAL IRON BINDING CAPACITY: 243 UG/DL (ref 240–450)
TRANSFERRIN SERPL-MCNC: 163 MG/DL (ref 200–360)

## 2021-02-09 PROCEDURE — 99232 SBSQ HOSP IP/OBS MODERATE 35: CPT | Performed by: HOSPITALIST

## 2021-02-09 PROCEDURE — 99233 SBSQ HOSP IP/OBS HIGH 50: CPT | Performed by: INTERNAL MEDICINE

## 2021-02-09 PROCEDURE — 93017 CV STRESS TEST TRACING ONLY: CPT | Performed by: INTERNAL MEDICINE

## 2021-02-09 PROCEDURE — 78452 HT MUSCLE IMAGE SPECT MULT: CPT | Performed by: INTERNAL MEDICINE

## 2021-02-09 PROCEDURE — 93018 CV STRESS TEST I&R ONLY: CPT | Performed by: INTERNAL MEDICINE

## 2021-02-09 RX ORDER — FUROSEMIDE 10 MG/ML
20 INJECTION INTRAMUSCULAR; INTRAVENOUS ONCE
Status: COMPLETED | OUTPATIENT
Start: 2021-02-09 | End: 2021-02-09

## 2021-02-09 RX ORDER — AMINOPHYLLINE DIHYDRATE 25 MG/ML
INJECTION, SOLUTION INTRAVENOUS
Status: COMPLETED
Start: 2021-02-09 | End: 2021-02-09

## 2021-02-09 NOTE — PROGRESS NOTES
[facility]  Cardiology Progress Note    Subjective:  Patient resting comfortably in bed, wife at bedside. States he feels really good today. He denies any chest pain, shortness of breath, dizziness, or palpitations. He also denies any LE pain.      O angio.  4.  Type 2 DM, A1c 6.8. LDL 55.  5.  HTN  6. AMADO on CKD, nephrology following. Creatinine 1.55 today. 7.  Abnormal resting echo with mild LV dysfunction and infero-apical wall motion abnormality, C/W underlying CAD. Plan:  1.  Malick Failing

## 2021-02-09 NOTE — PROGRESS NOTES
[facility]  Progress note     Zeynep Irizarry Patient Status:  Inpatient    1950 MRN [id number]   [facility] 5NW-A Attending Vin Mason MD   Hosp Day # 7 PCP LUIS ALFREDO SHAFFER     Chief Complaint: foot infection  Subjective: linda 41.7 mL/min (A) (based on SCr of 1.55 mg/dL (H)). No results for input(s): PTP, INR in the last 168 hours. No results for input(s): TROP, CK in the last 168 hours. Imaging: Imaging data reviewed in Epic. ASSESSMENT / PLAN:     1.  Left foot ce

## 2021-02-09 NOTE — ANESTHESIA PREPROCEDURE EVALUATION
PRE-OP EVALUATION    Patient Name: Jasper Galarza    Pre-op Diagnosis: gangrene    Procedure(s):  Left popliteal tibial bypass using vein, possible angiogram/ percutaneous transluminal angioplasty      Surgeon(s) and Role:  Panel 1:     Magaly Graham MD mL MBP/ADD-vantage, 3 g, Intravenous, Q12H    •  [COMPLETED] Perflutren Lipid Microsphere (DEFINITY) 6.52 MG/ML injection 1.5 mL, 1.5 mL, Intravenous, ONCE PRN    •  [COMPLETED] iohexol (OMNIPAQUE) 350 MG/ML injection 95 mL, 95 mL, Intravenous, ONCE PRN Intravenous, Q2H PRN    •  [MAR Hold] ondansetron HCl (ZOFRAN) injection 4 mg, 4 mg, Intravenous, Q6H PRN    •  [MAR Hold] Metoclopramide HCl (REGLAN) injection 10 mg, 10 mg, Intravenous, Q8H PRN    •  [COMPLETED] vancomycin IVPB premix 2g in 0.9% NaCl 500 Left atrium:  The left atrium was mildly dilated. Right ventricle:  The cavity size was normal. Systolic function was normal.   Systolic pressure was moderately to markedly increased. Right atrium:  The atrium was normal in size.    Mitral valve:   Mi 1. Abnormal left ventricular systolic function with low EF of 31% and paradoxical motion involving the septal aspect of the apical wall. There is also a dilated left ventricular volume of 153 mL.    2. Perfusion imaging is abnormal with a moderate sized, anesthesia, including nausea, dental damage, sore throat, mouth injury,and hoarseness from airway management. All questions were answered and understanding was demonstrated of risks.   Informed permission was obtained to proceed as documented in the signed

## 2021-02-09 NOTE — PHYSICAL THERAPY NOTE
PT orders received and chart reviewed. Attempted to see pt for eval however, pt off the floor for a stress test. Will follow and re-attempt as able and appropriate.

## 2021-02-09 NOTE — CM/SW NOTE
Spoke with pt and wife about [facility] - they are adamant that they do not want HH. Pt's [employer] wound vac was delivered to pt's room today. Wound care note states that pt's dressing changes will be 3x/wk.   Pt/wife say they live five minutes from the hospital and w

## 2021-02-09 NOTE — DIETARY NOTE
[address] [facility]    Estephania Matthew     Admitting diagnosis:  Diabetic foot infection ([facility] [address] [E11.628, L08.9]    Ht: 172.7 cm (5' 8\")  Wt: 98.5 kg (217 lb 1.6 oz). This is 141 % of IBW  Body mass index is 33.01 kg/m².   IBW: 70kg    Labs/M

## 2021-02-09 NOTE — PROGRESS NOTES
[facility]  Nephrology Progress Note    Nara Avilez Patient Status:  Inpatient    1950 MRN [id number]   [facility] 5NW-A Attending Richie Finley MD   Hosp Day # 7 PCP LUIS ALFREDO SHAFFER       SUBJECTIVE:    No complains  States he fe tablet, 2 tablet, Oral, Q4H PRN    •  morphINE sulfate (PF) 2 MG/ML injection 1 mg, 1 mg, Intravenous, Q2H PRN    Or    •  morphINE sulfate (PF) 2 MG/ML injection 2 mg, 2 mg, Intravenous, Q2H PRN    •  ondansetron HCl (ZOFRAN) injection 4 mg, 4 mg, Juan Hamm any ace-I/ARB    2.  L foot diabetic ulcer/abscess- s/p I&D and resection of 5th metatarsal- noted to have staph and strep species. On abx per ID.  ongoing multispecialty care; s/p angiogram which shows sig PAD- will need surgical revascularization     3.

## 2021-02-09 NOTE — PROGRESS NOTES
INFECTIOUS DISEASE PROGRESS NOTE    José Miguel Varela Patient Status:  Inpatient    1950 MRN [id number]   [facility] 5NW-A Attending Maria Eugenia Stahl MD   Hosp Day # 7 PCP Jose Miguel Jackson **OR** HYDROcodone-acetaminophen (NORCO) 5-325 MG per tab 2 tablet, 2 tablet, Oral, Q4H PRN  •  morphINE sulfate (PF) 2 MG/ML injection 1 mg, 1 mg, Intravenous, Q2H PRN **OR** morphINE sulfate (PF) 2 MG/ML injection 2 mg, 2 mg, Intravenous, Q2H PRN  •  ond Lab 02/03/21  1730   COLORUR Yellow   CLARITY Clear   SPECGRAVITY 1.016   GLUUR Negative   BILUR Negative   KETUR Negative   BLOODURINE Small*   PHURINE 7.0   PROUR 100 *   UROBILINOGEN <2.0   NITRITE Negative   LEUUR Negative   WBCUR 1-4   RBCUR 3-5* information is   transmitted to the ACR ([facility] [employer] of Radiology) NRDR ([address]) which includes the Dose Index Registry. PATIENT STATED HISTORY:(As transcribed by Technologist) Inpatient.  Patient presents with left foot w tibial artery with intermittent areas of reconstitution   likely related to collaterals and visualization up to the level of the left foot.  There is partial reconstitution of portions of the left posterior tibial artery which is seen down to the level of t collaterals. 3. There is a soft tissue ulcer along the dorsum of the left foot. Amputation changes noted involving the left 5th digit with osteomyelitis of the stump not entirely excluded. Clinical correlation recommended.      Please see above for furt of the 5th metatarsal head and distal metaphysis with evidence of overlying skin ulceration and cellulitis.    2. There is evidence of an additional focus of skin ulceration overlying the subcutaneous tissues on the dorsal aspect of the foot, in the vicinit

## 2021-02-09 NOTE — CM/SW NOTE
Annalisa Pickett from [facility] said pt's wound vac order from was esigned by Dr. Bernard Patel so they will be able to deliver pt's wound vac to his room today.   Spoke with pt's wife about [place] - she said pt would not want someone coming into their home but is willing to talk ab

## 2021-02-09 NOTE — PLAN OF CARE
Assumed care of pt.  At [address]  A&O x 4  Room Air- lungs clear  NSR on telemetry  Radial pulses 2/2  Left post tib- doppler  Right pedal and post tib 2  Left foot woundvac- kerlix and ACE C/D/I  Drainage serosanguinous  Right marivel- C/D/I- soft  NWB left foot range  Description: INTERVENTIONS:  - Monitor Blood Glucose as ordered  - Assess for signs and symptoms of hyperglycemia and hypoglycemia  - Administer ordered medications to maintain glucose within target range  - Assess barriers to adequate nutritional i

## 2021-02-10 ENCOUNTER — ANESTHESIA (OUTPATIENT)
Dept: CARDIAC SURGERY | Facility: HOSPITAL | Age: 71
DRG: 252 | End: 2021-02-10
Payer: MEDICARE

## 2021-02-10 LAB
ALBUMIN SERPL-MCNC: 1.7 G/DL (ref 3.4–5)
ALBUMIN/GLOB SERPL: 0.4 {RATIO} (ref 1–2)
ALP LIVER SERPL-CCNC: 206 U/L
ALT SERPL-CCNC: 42 U/L
ANION GAP SERPL CALC-SCNC: 5 MMOL/L (ref 0–18)
ANION GAP SERPL CALC-SCNC: 5 MMOL/L (ref 0–18)
APTT PPP: 193.4 SECONDS (ref 25.4–36.1)
AST SERPL-CCNC: 24 U/L (ref 15–37)
BILIRUB SERPL-MCNC: 1 MG/DL (ref 0.1–2)
BUN BLD-MCNC: 14 MG/DL (ref 7–18)
BUN BLD-MCNC: 16 MG/DL (ref 7–18)
BUN/CREAT SERPL: 10.9 (ref 10–20)
BUN/CREAT SERPL: 11.2 (ref 10–20)
CALCIUM BLD-MCNC: 7.3 MG/DL (ref 8.5–10.1)
CALCIUM BLD-MCNC: 8.1 MG/DL (ref 8.5–10.1)
CHLORIDE SERPL-SCNC: 105 MMOL/L (ref 98–112)
CHLORIDE SERPL-SCNC: 110 MMOL/L (ref 98–112)
CO2 SERPL-SCNC: 24 MMOL/L (ref 21–32)
CO2 SERPL-SCNC: 26 MMOL/L (ref 21–32)
CREAT BLD-MCNC: 1.25 MG/DL
CREAT BLD-MCNC: 1.47 MG/DL
DEPRECATED RDW RBC AUTO: 47.9 FL (ref 35.1–46.3)
ERYTHROCYTE [DISTWIDTH] IN BLOOD BY AUTOMATED COUNT: 16.5 % (ref 11–15)
GLOBULIN PLAS-MCNC: 4 G/DL (ref 2.8–4.4)
GLUCOSE BLD-MCNC: 100 MG/DL (ref 70–99)
GLUCOSE BLD-MCNC: 103 MG/DL (ref 70–99)
GLUCOSE BLD-MCNC: 104 MG/DL (ref 70–99)
GLUCOSE BLD-MCNC: 105 MG/DL (ref 70–99)
GLUCOSE BLD-MCNC: 121 MG/DL (ref 70–99)
GLUCOSE BLD-MCNC: 125 MG/DL (ref 70–99)
GLUCOSE BLD-MCNC: 130 MG/DL (ref 70–99)
GLUCOSE BLD-MCNC: 133 MG/DL (ref 70–99)
GLUCOSE BLD-MCNC: 148 MG/DL (ref 70–99)
GLUCOSE BLD-MCNC: 188 MG/DL (ref 70–99)
GLUCOSE BLD-MCNC: 95 MG/DL (ref 70–99)
GLUCOSE BLD-MCNC: 98 MG/DL (ref 70–99)
GLUCOSE BLD-MCNC: 99 MG/DL (ref 70–99)
HAV IGM SER QL: 2.2 MG/DL (ref 1.6–2.6)
HCT VFR BLD AUTO: 26.5 %
HGB BLD-MCNC: 8.3 G/DL
INR BLD: 1.57 (ref 0.89–1.11)
ISTAT ACTIVATED CLOTTING TIME: 136 SECONDS (ref 74–137)
ISTAT ACTIVATED CLOTTING TIME: 186 SECONDS (ref 74–137)
ISTAT ACTIVATED CLOTTING TIME: 197 SECONDS (ref 74–137)
ISTAT ACTIVATED CLOTTING TIME: 257 SECONDS (ref 74–137)
ISTAT ACTIVATED CLOTTING TIME: 263 SECONDS (ref 74–137)
ISTAT ACTIVATED CLOTTING TIME: 263 SECONDS (ref 74–137)
ISTAT ACTIVATED CLOTTING TIME: 268 SECONDS (ref 74–137)
ISTAT ACTIVATED CLOTTING TIME: 279 SECONDS (ref 74–137)
ISTAT BLOOD GAS BASE EXCESS: -1 MMOL/L (ref ?–30)
ISTAT BLOOD GAS BASE EXCESS: -1 MMOL/L (ref ?–30)
ISTAT BLOOD GAS BASE EXCESS: -2 MMOL/L (ref ?–30)
ISTAT BLOOD GAS BASE EXCESS: -2 MMOL/L (ref ?–30)
ISTAT BLOOD GAS BASE EXCESS: 0 MMOL/L (ref ?–30)
ISTAT BLOOD GAS BASE EXCESS: 2 MMOL/L (ref ?–30)
ISTAT BLOOD GAS HCO3: 23.5 MEQ/L (ref 22–26)
ISTAT BLOOD GAS HCO3: 23.5 MEQ/L (ref 22–26)
ISTAT BLOOD GAS HCO3: 23.6 MEQ/L (ref 22–26)
ISTAT BLOOD GAS HCO3: 24.2 MEQ/L (ref 22–26)
ISTAT BLOOD GAS HCO3: 24.6 MEQ/L (ref 22–26)
ISTAT BLOOD GAS HCO3: 26.6 MEQ/L (ref 22–26)
ISTAT BLOOD GAS O2 SATURATION: 100 % (ref 92–100)
ISTAT BLOOD GAS O2 SATURATION: 97 % (ref 92–100)
ISTAT BLOOD GAS O2 SATURATION: 98 % (ref 92–100)
ISTAT BLOOD GAS O2 SATURATION: 98 % (ref 92–100)
ISTAT BLOOD GAS O2 SATURATION: 99 % (ref 92–100)
ISTAT BLOOD GAS O2 SATURATION: 99 % (ref 92–100)
ISTAT BLOOD GAS PCO2: 33.5 MMHG (ref 35–45)
ISTAT BLOOD GAS PCO2: 38.8 MMHG (ref 35–45)
ISTAT BLOOD GAS PCO2: 39.8 MMHG (ref 35–45)
ISTAT BLOOD GAS PCO2: 39.9 MMHG (ref 35–45)
ISTAT BLOOD GAS PCO2: 41.7 MMHG (ref 35–45)
ISTAT BLOOD GAS PCO2: 41.7 MMHG (ref 35–45)
ISTAT BLOOD GAS PH: 7.38 (ref 7.35–7.45)
ISTAT BLOOD GAS PH: 7.38 (ref 7.35–7.45)
ISTAT BLOOD GAS PH: 7.39 (ref 7.35–7.45)
ISTAT BLOOD GAS PH: 7.39 (ref 7.35–7.45)
ISTAT BLOOD GAS PH: 7.41 (ref 7.35–7.45)
ISTAT BLOOD GAS PH: 7.46 (ref 7.35–7.45)
ISTAT BLOOD GAS PO2: 101 MMHG (ref 80–105)
ISTAT BLOOD GAS PO2: 102 MMHG (ref 80–105)
ISTAT BLOOD GAS PO2: 117 MMHG (ref 80–105)
ISTAT BLOOD GAS PO2: 142 MMHG (ref 80–105)
ISTAT BLOOD GAS PO2: 198 MMHG (ref 80–105)
ISTAT BLOOD GAS PO2: 90 MMHG (ref 80–105)
ISTAT BLOOD GAS TCO2: 25 MMOL/L (ref 22–32)
ISTAT BLOOD GAS TCO2: 26 MMOL/L (ref 22–32)
ISTAT BLOOD GAS TCO2: 28 MMOL/L (ref 22–32)
ISTAT HEMATOCRIT: 23 %
ISTAT HEMATOCRIT: 26 %
ISTAT HEMATOCRIT: 29 %
ISTAT HEMATOCRIT: 32 %
ISTAT HEMATOCRIT: 36 %
ISTAT HEMATOCRIT: 42 %
ISTAT IONIZED CALCIUM: 1.08 MMOL/L (ref 1.12–1.32)
ISTAT IONIZED CALCIUM: 1.09 MMOL/L (ref 1.12–1.32)
ISTAT IONIZED CALCIUM: 1.1 MMOL/L (ref 1.12–1.32)
ISTAT IONIZED CALCIUM: 1.1 MMOL/L (ref 1.12–1.32)
ISTAT IONIZED CALCIUM: 1.12 MMOL/L (ref 1.12–1.32)
ISTAT IONIZED CALCIUM: 1.12 MMOL/L (ref 1.12–1.32)
ISTAT POTASSIUM: 3.6 MMOL/L (ref 3.6–5.1)
ISTAT POTASSIUM: 3.9 MMOL/L (ref 3.6–5.1)
ISTAT POTASSIUM: 4 MMOL/L (ref 3.6–5.1)
ISTAT POTASSIUM: 4.1 MMOL/L (ref 3.6–5.1)
ISTAT SODIUM: 137 MMOL/L (ref 136–145)
ISTAT SODIUM: 138 MMOL/L (ref 136–145)
ISTAT SODIUM: 139 MMOL/L (ref 136–145)
ISTAT SODIUM: 140 MMOL/L (ref 136–145)
ISTAT SODIUM: 141 MMOL/L (ref 136–145)
ISTAT SODIUM: 141 MMOL/L (ref 136–145)
M PROTEIN MFR SERPL ELPH: 5.7 G/DL (ref 6.4–8.2)
MCH RBC QN AUTO: 25.9 PG (ref 26–34)
MCHC RBC AUTO-ENTMCNC: 31.3 G/DL (ref 31–37)
MCV RBC AUTO: 82.8 FL
OSMOLALITY SERPL CALC.SUM OF ELEC: 284 MOSM/KG (ref 275–295)
OSMOLALITY SERPL CALC.SUM OF ELEC: 290 MOSM/KG (ref 275–295)
PLATELET # BLD AUTO: 244 10(3)UL (ref 150–450)
PLATELET # BLD AUTO: 244 10(3)UL (ref 150–450)
POTASSIUM SERPL-SCNC: 3.9 MMOL/L (ref 3.5–5.1)
POTASSIUM SERPL-SCNC: 3.9 MMOL/L (ref 3.5–5.1)
PSA SERPL DL<=0.01 NG/ML-MCNC: 19.2 SECONDS (ref 12.4–14.6)
RBC # BLD AUTO: 3.2 X10(6)UL
SODIUM SERPL-SCNC: 136 MMOL/L (ref 136–145)
SODIUM SERPL-SCNC: 139 MMOL/L (ref 136–145)
WBC # BLD AUTO: 20.6 X10(3) UL (ref 4–11)

## 2021-02-10 PROCEDURE — 04US07Z SUPPLEMENT LEFT POSTERIOR TIBIAL ARTERY WITH AUTOLOGOUS TISSUE SUBSTITUTE, OPEN APPROACH: ICD-10-PCS | Performed by: SURGERY

## 2021-02-10 PROCEDURE — 04CN0ZZ EXTIRPATION OF MATTER FROM LEFT POPLITEAL ARTERY, OPEN APPROACH: ICD-10-PCS | Performed by: SURGERY

## 2021-02-10 PROCEDURE — 99232 SBSQ HOSP IP/OBS MODERATE 35: CPT | Performed by: INTERNAL MEDICINE

## 2021-02-10 PROCEDURE — 041N09Q BYPASS LEFT POPLITEAL ARTERY TO LOWER EXTREMITY ARTERY WITH AUTOLOGOUS VENOUS TISSUE, OPEN APPROACH: ICD-10-PCS | Performed by: SURGERY

## 2021-02-10 PROCEDURE — 04CS0ZZ EXTIRPATION OF MATTER FROM LEFT POSTERIOR TIBIAL ARTERY, OPEN APPROACH: ICD-10-PCS | Performed by: SURGERY

## 2021-02-10 PROCEDURE — 99233 SBSQ HOSP IP/OBS HIGH 50: CPT | Performed by: INTERNAL MEDICINE

## 2021-02-10 PROCEDURE — 04UN07Z SUPPLEMENT LEFT POPLITEAL ARTERY WITH AUTOLOGOUS TISSUE SUBSTITUTE, OPEN APPROACH: ICD-10-PCS | Performed by: SURGERY

## 2021-02-10 PROCEDURE — 30233N0 TRANSFUSION OF AUTOLOGOUS RED BLOOD CELLS INTO PERIPHERAL VEIN, PERCUTANEOUS APPROACH: ICD-10-PCS | Performed by: SURGERY

## 2021-02-10 PROCEDURE — 06BQ0ZZ EXCISION OF LEFT SAPHENOUS VEIN, OPEN APPROACH: ICD-10-PCS | Performed by: SURGERY

## 2021-02-10 RX ORDER — ONDANSETRON 2 MG/ML
4 INJECTION INTRAMUSCULAR; INTRAVENOUS EVERY 6 HOURS PRN
Status: DISCONTINUED | OUTPATIENT
Start: 2021-02-10 | End: 2021-02-12

## 2021-02-10 RX ORDER — POLYETHYLENE GLYCOL 3350 17 G/17G
17 POWDER, FOR SOLUTION ORAL DAILY PRN
Status: DISCONTINUED | OUTPATIENT
Start: 2021-02-10 | End: 2021-02-20

## 2021-02-10 RX ORDER — HYDROCODONE BITARTRATE AND ACETAMINOPHEN 5; 325 MG/1; MG/1
2 TABLET ORAL EVERY 4 HOURS PRN
Status: DISCONTINUED | OUTPATIENT
Start: 2021-02-10 | End: 2021-02-20

## 2021-02-10 RX ORDER — DOBUTAMINE HYDROCHLORIDE 200 MG/100ML
INJECTION INTRAVENOUS CONTINUOUS PRN
Status: DISCONTINUED | OUTPATIENT
Start: 2021-02-10 | End: 2021-02-10 | Stop reason: SURG

## 2021-02-10 RX ORDER — PANTOPRAZOLE SODIUM 40 MG/1
40 TABLET, DELAYED RELEASE ORAL EVERY 24 HOURS
Status: DISCONTINUED | OUTPATIENT
Start: 2021-02-10 | End: 2021-02-20

## 2021-02-10 RX ORDER — ASPIRIN 81 MG/1
81 TABLET ORAL DAILY
Status: DISCONTINUED | OUTPATIENT
Start: 2021-02-10 | End: 2021-02-20

## 2021-02-10 RX ORDER — ONDANSETRON 2 MG/ML
INJECTION INTRAMUSCULAR; INTRAVENOUS AS NEEDED
Status: DISCONTINUED | OUTPATIENT
Start: 2021-02-10 | End: 2021-02-10 | Stop reason: SURG

## 2021-02-10 RX ORDER — HEPARIN SODIUM 5000 [USP'U]/ML
5000 INJECTION, SOLUTION INTRAVENOUS; SUBCUTANEOUS EVERY 12 HOURS SCHEDULED
Status: DISCONTINUED | OUTPATIENT
Start: 2021-02-10 | End: 2021-02-20

## 2021-02-10 RX ORDER — CLOPIDOGREL BISULFATE 75 MG/1
75 TABLET ORAL DAILY
Status: DISCONTINUED | OUTPATIENT
Start: 2021-02-10 | End: 2021-02-20

## 2021-02-10 RX ORDER — SODIUM CHLORIDE, SODIUM LACTATE, POTASSIUM CHLORIDE, CALCIUM CHLORIDE 600; 310; 30; 20 MG/100ML; MG/100ML; MG/100ML; MG/100ML
INJECTION, SOLUTION INTRAVENOUS CONTINUOUS
Status: DISCONTINUED | OUTPATIENT
Start: 2021-02-10 | End: 2021-02-11

## 2021-02-10 RX ORDER — SODIUM CHLORIDE 9 MG/ML
INJECTION, SOLUTION INTRAVENOUS CONTINUOUS
Status: DISCONTINUED | OUTPATIENT
Start: 2021-02-10 | End: 2021-02-10

## 2021-02-10 RX ORDER — PROTAMINE SULFATE 10 MG/ML
INJECTION, SOLUTION INTRAVENOUS AS NEEDED
Status: DISCONTINUED | OUTPATIENT
Start: 2021-02-10 | End: 2021-02-10 | Stop reason: SURG

## 2021-02-10 RX ORDER — ACETAMINOPHEN 325 MG/1
650 TABLET ORAL EVERY 4 HOURS PRN
Status: DISCONTINUED | OUTPATIENT
Start: 2021-02-10 | End: 2021-02-20

## 2021-02-10 RX ORDER — ONDANSETRON 2 MG/ML
4 INJECTION INTRAMUSCULAR; INTRAVENOUS ONCE AS NEEDED
Status: ACTIVE | OUTPATIENT
Start: 2021-02-10 | End: 2021-02-10

## 2021-02-10 RX ORDER — AMPICILLIN AND SULBACTAM 2; 1 G/1; G/1
INJECTION, POWDER, FOR SOLUTION INTRAMUSCULAR; INTRAVENOUS AS NEEDED
Status: DISCONTINUED | OUTPATIENT
Start: 2021-02-10 | End: 2021-02-10 | Stop reason: SURG

## 2021-02-10 RX ORDER — MORPHINE SULFATE 2 MG/ML
1 INJECTION, SOLUTION INTRAMUSCULAR; INTRAVENOUS EVERY 2 HOUR PRN
Status: DISCONTINUED | OUTPATIENT
Start: 2021-02-10 | End: 2021-02-20

## 2021-02-10 RX ORDER — EPHEDRINE SULFATE 50 MG/ML
INJECTION INTRAVENOUS AS NEEDED
Status: DISCONTINUED | OUTPATIENT
Start: 2021-02-10 | End: 2021-02-10 | Stop reason: SURG

## 2021-02-10 RX ORDER — MORPHINE SULFATE 2 MG/ML
2 INJECTION, SOLUTION INTRAMUSCULAR; INTRAVENOUS EVERY 2 HOUR PRN
Status: DISCONTINUED | OUTPATIENT
Start: 2021-02-10 | End: 2021-02-20

## 2021-02-10 RX ORDER — GLYCOPYRROLATE 0.2 MG/ML
INJECTION, SOLUTION INTRAMUSCULAR; INTRAVENOUS AS NEEDED
Status: DISCONTINUED | OUTPATIENT
Start: 2021-02-10 | End: 2021-02-10 | Stop reason: SURG

## 2021-02-10 RX ORDER — SODIUM PHOSPHATE, DIBASIC AND SODIUM PHOSPHATE, MONOBASIC 7; 19 G/133ML; G/133ML
1 ENEMA RECTAL ONCE AS NEEDED
Status: DISCONTINUED | OUTPATIENT
Start: 2021-02-10 | End: 2021-02-20

## 2021-02-10 RX ORDER — DOCUSATE SODIUM 100 MG/1
100 CAPSULE, LIQUID FILLED ORAL 2 TIMES DAILY
Status: DISCONTINUED | OUTPATIENT
Start: 2021-02-10 | End: 2021-02-20

## 2021-02-10 RX ORDER — LIDOCAINE HYDROCHLORIDE 10 MG/ML
INJECTION, SOLUTION EPIDURAL; INFILTRATION; INTRACAUDAL; PERINEURAL AS NEEDED
Status: DISCONTINUED | OUTPATIENT
Start: 2021-02-10 | End: 2021-02-10 | Stop reason: SURG

## 2021-02-10 RX ORDER — HYDROCODONE BITARTRATE AND ACETAMINOPHEN 5; 325 MG/1; MG/1
1 TABLET ORAL EVERY 4 HOURS PRN
Status: DISCONTINUED | OUTPATIENT
Start: 2021-02-10 | End: 2021-02-20

## 2021-02-10 RX ORDER — HEPARIN SODIUM 1000 [USP'U]/ML
INJECTION, SOLUTION INTRAVENOUS; SUBCUTANEOUS AS NEEDED
Status: DISCONTINUED | OUTPATIENT
Start: 2021-02-10 | End: 2021-02-10 | Stop reason: SURG

## 2021-02-10 RX ORDER — ROCURONIUM BROMIDE 10 MG/ML
INJECTION, SOLUTION INTRAVENOUS AS NEEDED
Status: DISCONTINUED | OUTPATIENT
Start: 2021-02-10 | End: 2021-02-10 | Stop reason: SURG

## 2021-02-10 RX ORDER — NEOSTIGMINE METHYLSULFATE 1 MG/ML
INJECTION INTRAVENOUS AS NEEDED
Status: DISCONTINUED | OUTPATIENT
Start: 2021-02-10 | End: 2021-02-10 | Stop reason: SURG

## 2021-02-10 RX ORDER — BISACODYL 10 MG
10 SUPPOSITORY, RECTAL RECTAL
Status: DISCONTINUED | OUTPATIENT
Start: 2021-02-10 | End: 2021-02-20

## 2021-02-10 RX ORDER — NALOXONE HYDROCHLORIDE 0.4 MG/ML
80 INJECTION, SOLUTION INTRAMUSCULAR; INTRAVENOUS; SUBCUTANEOUS AS NEEDED
Status: ACTIVE | OUTPATIENT
Start: 2021-02-10 | End: 2021-02-10

## 2021-02-10 RX ADMIN — SODIUM CHLORIDE: 9 INJECTION, SOLUTION INTRAVENOUS at 12:47:00

## 2021-02-10 RX ADMIN — DOBUTAMINE HYDROCHLORIDE 2.5 MCG/KG/MIN: 200 INJECTION INTRAVENOUS at 10:27:00

## 2021-02-10 RX ADMIN — ROCURONIUM BROMIDE 50 MG: 10 INJECTION, SOLUTION INTRAVENOUS at 07:45:00

## 2021-02-10 RX ADMIN — HEPARIN SODIUM 6000 UNITS: 1000 INJECTION, SOLUTION INTRAVENOUS; SUBCUTANEOUS at 09:31:00

## 2021-02-10 RX ADMIN — HEPARIN SODIUM 6000 UNITS: 1000 INJECTION, SOLUTION INTRAVENOUS; SUBCUTANEOUS at 09:48:00

## 2021-02-10 RX ADMIN — HEPARIN SODIUM 4000 UNITS: 1000 INJECTION, SOLUTION INTRAVENOUS; SUBCUTANEOUS at 10:04:00

## 2021-02-10 RX ADMIN — HEPARIN SODIUM 6000 UNITS: 1000 INJECTION, SOLUTION INTRAVENOUS; SUBCUTANEOUS at 09:00:00

## 2021-02-10 RX ADMIN — HEPARIN SODIUM 3000 UNITS: 1000 INJECTION, SOLUTION INTRAVENOUS; SUBCUTANEOUS at 10:26:00

## 2021-02-10 RX ADMIN — DOBUTAMINE HYDROCHLORIDE 1 MCG/KG/MIN: 200 INJECTION INTRAVENOUS at 09:29:00

## 2021-02-10 RX ADMIN — SODIUM CHLORIDE: 9 INJECTION, SOLUTION INTRAVENOUS at 12:24:00

## 2021-02-10 RX ADMIN — SODIUM CHLORIDE: 9 INJECTION, SOLUTION INTRAVENOUS at 10:10:00

## 2021-02-10 RX ADMIN — ROCURONIUM BROMIDE 10 MG: 10 INJECTION, SOLUTION INTRAVENOUS at 09:01:00

## 2021-02-10 RX ADMIN — SODIUM CHLORIDE: 9 INJECTION, SOLUTION INTRAVENOUS at 11:02:00

## 2021-02-10 RX ADMIN — NEOSTIGMINE METHYLSULFATE 3 MG: 1 INJECTION INTRAVENOUS at 13:26:00

## 2021-02-10 RX ADMIN — SODIUM CHLORIDE: 9 INJECTION, SOLUTION INTRAVENOUS at 13:11:00

## 2021-02-10 RX ADMIN — DOBUTAMINE HYDROCHLORIDE 3 MCG/KG/MIN: 200 INJECTION INTRAVENOUS at 08:37:00

## 2021-02-10 RX ADMIN — EPHEDRINE SULFATE 5 MG: 50 INJECTION INTRAVENOUS at 08:06:00

## 2021-02-10 RX ADMIN — ROCURONIUM BROMIDE 20 MG: 10 INJECTION, SOLUTION INTRAVENOUS at 10:45:00

## 2021-02-10 RX ADMIN — ROCURONIUM BROMIDE 10 MG: 10 INJECTION, SOLUTION INTRAVENOUS at 08:39:00

## 2021-02-10 RX ADMIN — HEPARIN SODIUM 3000 UNITS: 1000 INJECTION, SOLUTION INTRAVENOUS; SUBCUTANEOUS at 09:22:00

## 2021-02-10 RX ADMIN — SODIUM CHLORIDE: 9 INJECTION, SOLUTION INTRAVENOUS at 07:34:00

## 2021-02-10 RX ADMIN — SODIUM CHLORIDE: 9 INJECTION, SOLUTION INTRAVENOUS at 09:13:00

## 2021-02-10 RX ADMIN — HEPARIN SODIUM 2000 UNITS: 1000 INJECTION, SOLUTION INTRAVENOUS; SUBCUTANEOUS at 10:56:00

## 2021-02-10 RX ADMIN — ROCURONIUM BROMIDE 10 MG: 10 INJECTION, SOLUTION INTRAVENOUS at 08:18:00

## 2021-02-10 RX ADMIN — HEPARIN SODIUM 2000 UNITS: 1000 INJECTION, SOLUTION INTRAVENOUS; SUBCUTANEOUS at 12:13:00

## 2021-02-10 RX ADMIN — PROTAMINE SULFATE 5 MG: 10 INJECTION, SOLUTION INTRAVENOUS at 13:08:00

## 2021-02-10 RX ADMIN — SODIUM CHLORIDE: 9 INJECTION, SOLUTION INTRAVENOUS at 13:26:00

## 2021-02-10 RX ADMIN — HEPARIN SODIUM 3000 UNITS: 1000 INJECTION, SOLUTION INTRAVENOUS; SUBCUTANEOUS at 11:41:00

## 2021-02-10 RX ADMIN — LIDOCAINE HYDROCHLORIDE 100 MG: 10 INJECTION, SOLUTION EPIDURAL; INFILTRATION; INTRACAUDAL; PERINEURAL at 07:45:00

## 2021-02-10 RX ADMIN — DOBUTAMINE HYDROCHLORIDE 2 MCG/KG/MIN: 200 INJECTION INTRAVENOUS at 09:00:00

## 2021-02-10 RX ADMIN — ONDANSETRON 4 MG: 2 INJECTION INTRAMUSCULAR; INTRAVENOUS at 13:21:00

## 2021-02-10 RX ADMIN — SODIUM CHLORIDE: 9 INJECTION, SOLUTION INTRAVENOUS at 11:39:00

## 2021-02-10 RX ADMIN — GLYCOPYRROLATE 0.4 MG: 0.2 INJECTION, SOLUTION INTRAMUSCULAR; INTRAVENOUS at 13:26:00

## 2021-02-10 RX ADMIN — EPHEDRINE SULFATE 10 MG: 50 INJECTION INTRAVENOUS at 08:14:00

## 2021-02-10 RX ADMIN — EPHEDRINE SULFATE 5 MG: 50 INJECTION INTRAVENOUS at 08:10:00

## 2021-02-10 RX ADMIN — PROTAMINE SULFATE 30 MG: 10 INJECTION, SOLUTION INTRAVENOUS at 12:34:00

## 2021-02-10 RX ADMIN — AMPICILLIN AND SULBACTAM 3 G: 2; 1 INJECTION, POWDER, FOR SOLUTION INTRAMUSCULAR; INTRAVENOUS at 08:25:00

## 2021-02-10 RX ADMIN — ROCURONIUM BROMIDE 20 MG: 10 INJECTION, SOLUTION INTRAVENOUS at 09:29:00

## 2021-02-10 RX ADMIN — DOBUTAMINE HYDROCHLORIDE 2 MCG/KG/MIN: 200 INJECTION INTRAVENOUS at 08:05:00

## 2021-02-10 RX ADMIN — HEPARIN SODIUM 3000 UNITS: 1000 INJECTION, SOLUTION INTRAVENOUS; SUBCUTANEOUS at 11:20:00

## 2021-02-10 NOTE — PROGRESS NOTES
[facility]  Cardiology Critical Care Progress Note    Julio Bundy Patient Status:  Inpatient    1950 MRN [id number]   [facility] 6NE-A Attending Stanley Fleischer, MD   Hosp Day # 8 PCP LUIS ALFREDO SHAFFER       Subjective:  Just up from nicholson Nightly   • [MAR Hold] temazepam  15 mg Oral Nightly   • ampicillin-sulbactam  3 g Intravenous Q12H   • [MAR Hold] metoprolol succinate  25 mg Oral Daily Beta Blocker   • [MAR Hold] atorvastatin  40 mg Oral Nightly   • [MAR Hold] PEG 3350  17 g Oral Daily

## 2021-02-10 NOTE — ANESTHESIA PROCEDURE NOTES
Arterial Line  Performed by: Doreen Asencio MD  Authorized by: Doreen Asencio MD     General Information and Staff    Procedure Start:  2/10/2021 7:42 AM  Procedure End:  2/10/2021 7:46 AM  Anesthesiologist:  Doreen Asencio MD  Performed By:  Yanna Buckner

## 2021-02-10 NOTE — ANESTHESIA PROCEDURE NOTES
Peripheral IV  Date/Time: 2/10/2021 7:58 AM  Inserted by: Sergio Gonzáles MD    Placement  Needle size: 18 G  Laterality: right  Location: hand  Local anesthetic: none  Site prep: alcohol  Technique: anatomical landmarks  Attempts: 1

## 2021-02-10 NOTE — WOUND PROGRESS NOTE
[facility]  Report of Inpatient Wound Care Consultation    Delinda Angelucci Patient Status:  Inpatient    1950 MRN [id number]   [facility] 6NE-A Attending Jenni Horowitz MD   Hosp Day # 8  [facility]     Reason for Consultation: edges-  to distal aspect of the wound  Procedure tolerated by the patient well. Recommendations:    Continue plan of care--Continue negative pressure wound therapy set at 125 mmHg continuous pressure.     Pt/spouse continue to refuse homehealth car

## 2021-02-10 NOTE — PROGRESS NOTES
[facility]  Nephrology Progress Note    Lottie Pool Patient Status:  Inpatient    1950 MRN [id number]   [facility] 5NW-A Attending Sean Coles MD   Hosp Day # 8 PCP LUIS ALFREDO SHAFFER       SUBJECTIVE:    S/p OR today; s/p L leg re Q24H    Or    •  Pantoprazole Sodium (PROTONIX) EC tab 40 mg, 40 mg, Oral, Q24H    •  [MAR Hold] insulin detemir (LEVEMIR) 100 UNIT/ML flextouch 10 Units, 10 Units, Subcutaneous, Nightly    •  0.9% NaCl infusion, , Intravenous, Continuous    •  [MAR Hold] injection 2 mg, 2 mg, Intravenous, Q2H PRN    •  [MAR Hold] ondansetron HCl (ZOFRAN) injection 4 mg, 4 mg, Intravenous, Q6H PRN    •  [MAR Hold] Metoclopramide HCl (REGLAN) injection 10 mg, 10 mg, Intravenous, Q8H PRN            Physical Exam:   /63 multispecialty care; s/p angiogram which shows sig PAD; now s/p surgical revascularization today    3. Anemia - iron deficient; iv iron     4. DM     5.  HTN- monitor; holding Acei/ARB    Questions/concerns were discussed with patient and/or family by rosy

## 2021-02-10 NOTE — PROGRESS NOTES
[facility]  Progress note     Kinza Tripathi Patient Status:  Inpatient    1950 MRN [id number]   [facility] 5NW-A Attending Riddhi Hollingsworth MD   Hosp Day # 8 PCP LUIS ALFREDO SHAFFER     Chief Complaint: foot infection  Subjective: Stat Lab 02/10/21  1414   PTP 19.2*   INR 1.57*       No results for input(s): TROP, CK in the last 168 hours. Imaging: Imaging data reviewed in Epic. ASSESSMENT / PLAN:     1.  Left foot cellulitis and osteomyelitis, diabetic foot ulcer and peripheral

## 2021-02-10 NOTE — OPERATIVE REPORT
Pre-op Dx: Gangrene left foot. Posdt-op Dx\": same. Procedure: Left SAKP- distal PT left GSV ISSV bypass with endarterectomy left popliteal/ PT. Surgeon: Palmer/ Asst: Radha Peña. EBL: 700cc. 220cvc cell saver. 4 Liters crystalloid.

## 2021-02-10 NOTE — PLAN OF CARE
Assumed care at 0700  Pt AxOx4, SR on tele, RA  Denies pain. WV intact w/ minimal SS output  BLE pulses present per doppler  Stress test completed  IV abx given per [facility]  Plan for fem-pop bypass tomorrow AM w/ Dr. Hitesh Tejada. NPO after MN.   POC discussed with p

## 2021-02-10 NOTE — CONSULTS
[address]    PATIENT'S NAME: Constantine Patel   ATTENDING PHYSICIAN: Giovany Hardy M.D.   CONSULTING PHYSICIAN: Delmi Santa M.D.    PATIENT ACCOUNT#:   [de-identified]    LOCATION:  [address] [place] [address]  MEDICAL RECORD #:   [id number]       DATE OF BIRTH:  [birth year]

## 2021-02-10 NOTE — CM/SW NOTE
Forest Xie from wound care states she will gave pt phone number to set up OP wound care appointments at [facility].   ID will switch pt to [facility] once daily at [facility] ( plan is to go to [facility] for daily OP infusions)  / to remain available for

## 2021-02-10 NOTE — PROGRESS NOTES
INFECTIOUS DISEASE PROGRESS NOTE    Elo Meier Patient Status:  Inpatient    1950 MRN [id number]   [facility] 5NW-A Attending Alina Sanders MD   Hosp Day # 8 PCP Maisha Husain •  Pantoprazole Sodium (PROTONIX) 40 mg in Sodium Chloride (PF) 0.9 % 10 mL IV push, 40 mg, Intravenous, Q24H **OR** Pantoprazole Sodium (PROTONIX) EC tab 40 mg, 40 mg, Oral, Q24H  •  iron sucrose (VENOFER) IV Push 200 mg, 200 mg, Intravenous, Daily  •  [M Vital signs: Blood pressure 136/63, pulse 87, temperature 99.1 °F (37.3 °C), temperature source Temporal, resp. rate 24, height 5' 8\" (1.727 m), weight 217 lb 1.6 oz (98.5 kg), SpO2 100 %. HEENT: no scleral icterus or conjunctival injection.  Moist mucous Collection Time: 02/04/21  5:13 PM    Specimen: Foot,left; Abscess   Result Value Ref Range    Anaerobic Culture 2+ growth Bacteroides thetaiotaomicron (A) N/A   2.  AEROBIC BACTERIAL CULTURE     Status: Abnormal    Collection Time: 02/04/21  5:13 PM    Sp Mild mixed plaque in the distal abdominal aorta. There is mild mixed plaque in the bilateral common iliac arteries without hemodynamically significant narrowing. The external iliac arteries are tortuous but otherwise widely patent.  There is scattered   mil peroneal artery which is seen down to the level of the left ankle. There is soft tissue edema scattered throughout the bilateral lower extremities. There is a soft tissue ulcer along the dorsum of the left foot.  Amputation changes noted involving the l Dictated by (CST): Apple Garcia MD on 2/04/2021 at 12:38 PM   Finalized by (CST): Apple Garcia MD on 2/04/2021 at 12:48 PM       PROCEDURE: MRI FOOT, LEFT (CPT=73718)     COMPARISON: [facility] , XR, XR FOOT, COMPLETE (MIN 3 VIEWS), LEFT (CPT=73630), 2/0 2. There is evidence of an additional focus of skin ulceration overlying the subcutaneous tissues on the dorsal aspect of the foot, in the vicinity of the 3rd and 4th tarsometatarsal joints. No underlying osteomyelitis noted.      Dictated by (CST): Fitz power,

## 2021-02-10 NOTE — ANESTHESIA PROCEDURE NOTES
Airway  Date/Time: 2/10/2021 7:48 AM  Urgency: elective    Airway not difficult    General Information and Staff    Patient location during procedure: OR  Anesthesiologist: Doreen Asencio MD  Performed: anesthesiologist     Indications and Patient Conditio

## 2021-02-10 NOTE — PLAN OF CARE
Assumed pt care at 1900  Pt. A/O x4  RA  NSR on tele  Non pitting edema to BLE; pulses via doppler  Wound vac to left foot; dressing c/d/i  No c/o pain   Bed rest   Consent form signed; sx in am; NPO at [facility].  Will need PICC placed prior to discharge  Lovenox ordered  - Assess for signs and symptoms of hyperglycemia and hypoglycemia  - Administer ordered medications to maintain glucose within target range  - Assess barriers to adequate nutritional intake and initiate nutrition consult as needed  - Instruct linda

## 2021-02-11 LAB
ANION GAP SERPL CALC-SCNC: 5 MMOL/L (ref 0–18)
BUN BLD-MCNC: 16 MG/DL (ref 7–18)
BUN/CREAT SERPL: 12.5 (ref 10–20)
CALCIUM BLD-MCNC: 7.5 MG/DL (ref 8.5–10.1)
CHLORIDE SERPL-SCNC: 109 MMOL/L (ref 98–112)
CO2 SERPL-SCNC: 25 MMOL/L (ref 21–32)
CREAT BLD-MCNC: 1.28 MG/DL
DEPRECATED RDW RBC AUTO: 47.8 FL (ref 35.1–46.3)
ERYTHROCYTE [DISTWIDTH] IN BLOOD BY AUTOMATED COUNT: 16.5 % (ref 11–15)
GLUCOSE BLD-MCNC: 136 MG/DL (ref 70–99)
GLUCOSE BLD-MCNC: 137 MG/DL (ref 70–99)
GLUCOSE BLD-MCNC: 183 MG/DL (ref 70–99)
GLUCOSE BLD-MCNC: 196 MG/DL (ref 70–99)
GLUCOSE BLD-MCNC: 230 MG/DL (ref 70–99)
HAV IGM SER QL: 2 MG/DL (ref 1.6–2.6)
HCT VFR BLD AUTO: 22.6 %
HGB BLD-MCNC: 7.2 G/DL
MCH RBC QN AUTO: 26.3 PG (ref 26–34)
MCHC RBC AUTO-ENTMCNC: 31.9 G/DL (ref 31–37)
MCV RBC AUTO: 82.5 FL
OSMOLALITY SERPL CALC.SUM OF ELEC: 291 MOSM/KG (ref 275–295)
PLATELET # BLD AUTO: 220 10(3)UL (ref 150–450)
POTASSIUM SERPL-SCNC: 4.5 MMOL/L (ref 3.5–5.1)
RBC # BLD AUTO: 2.74 X10(6)UL
SODIUM SERPL-SCNC: 139 MMOL/L (ref 136–145)
WBC # BLD AUTO: 12.1 X10(3) UL (ref 4–11)

## 2021-02-11 PROCEDURE — 99232 SBSQ HOSP IP/OBS MODERATE 35: CPT | Performed by: INTERNAL MEDICINE

## 2021-02-11 PROCEDURE — 99233 SBSQ HOSP IP/OBS HIGH 50: CPT | Performed by: INTERNAL MEDICINE

## 2021-02-11 RX ORDER — GABAPENTIN 100 MG/1
100 CAPSULE ORAL 3 TIMES DAILY
Status: DISCONTINUED | OUTPATIENT
Start: 2021-02-11 | End: 2021-02-20

## 2021-02-11 NOTE — PROGRESS NOTES
[facility]  Progress Note    Alethea Lucero Patient Status:  Inpatient    1950 MRN [id number]   [facility] 6NE-A Attending Paul Beltre MD   Hosp Day # 9 PCP LUIS ALFREDO SHAFFER       Assessment and Plan:    1.  PAD -status post poplit BILT  --  1.0  --    TP  --  5.7*  --        Recent Labs   Lab 02/05/21  0553 02/06/21  0817 02/07/21  0811 02/10/21  0644 02/10/21  1414 02/11/21  0514   RBC 3.35* 3.34* 3.50*  --  3.20* 2.74*   HGB 8.6* 8.7* 9.1*  --  8.3* 7.2*   HCT 28.2* 28.5* 29.4*

## 2021-02-11 NOTE — PHYSICAL THERAPY NOTE
PHYSICAL THERAPY EVALUATION - INPATIENT     Room Number: 6290/4255-T  Evaluation Date: 2/11/2021  Type of Evaluation: Initial  Physician Order: PT Eval and Treat    Presenting Problem: Diabetic L foot wound infection  Reason for Therapy: Mobility Dys miles a day. Pt reports that L foot began hurting him on 1/25/21 and his movement has been limited since then. Pt's plan is to go home w/ wife and daughter assistance and attend wound clinic 3x a week here at [address]       SUBJECTIVE  \"I follow the rules Turning over in bed (including adjusting bedclothes, sheets and blankets)?: None   -   Sitting down on and standing up from a chair with arms (e.g., wheelchair, bedside commode, etc.): A Little   -   Moving from lying on back to sitting on the side of the session/findings; All patient questions and concerns addressed(Nick Orellana aware of eval session)    ASSESSMENT   Patient is a 65year old male admitted on 2/2/2021 for L wound infection, now s/p 2/6 I&d and resection 5th metatarsal osteomyelitis and 2/10 fem-t in surgical mask, goggles and gloves. Pt wearing mask during session.

## 2021-02-11 NOTE — PROGRESS NOTES
Received pt from [facility] s/p Fem-tib bypass. He is drowsy but oriented and following commands. Left leg ace wrapped. Wound care  RN at bedside- wound vac reapplied. Pain treated with IV morphine, Norco and repositioning. Art line, ballard in place.  Vitals sig

## 2021-02-11 NOTE — CM/SW NOTE
PT recommending patient utilize a wheelchair, walker, and commode. CM met with patient to recommend borrowing wheelchair from lending closet. Lending closets list in the area given to patient.  Informed patient or his family call the lending closets and ma

## 2021-02-11 NOTE — PROGRESS NOTES
No complaints. Afebrile. Graft patent. Wounds clean/dry. Wound vac on foot.  Discussed surgery with wife/ patient

## 2021-02-11 NOTE — PROGRESS NOTES
[facility]  Nephrology Progress Note    Lea Warner Patient Status:  Inpatient    1950 MRN [id number]   [facility] 5NW-A Attending Ambrocio Rodriguez MD   Hosp Day # 9 PCP LUIS ALFREDO SHAFFER       SUBJECTIVE:  Patient doing well this morn 7. 3* 7.5*   MG  --   --  2.2  --  2.0   * 122* 105* 130* 136*       Recent Labs   Lab 02/10/21  1414   ALT 42   AST 24   ALB 1.7*       Recent Labs   Lab 02/10/21  0539 02/10/21  1419 02/10/21  1710 02/10/21  2209 02/11/21  0712   PGLU 121* 125* 133 hydroxide (MILK OF MAGNESIA) 400 MG/5ML suspension 30 mL, 30 mL, Oral, BID PRN    •  Ampicillin-Sulbactam Sodium (UNASYN) 3 g in sodium chloride 0.9% 100 mL MBP/ADD-vantage, 3 g, Intravenous, Q12H    •  Metoprolol Succinate ER (Toprol XL) 24 hr tab 25 mg,

## 2021-02-11 NOTE — PROGRESS NOTES
INFECTIOUS DISEASE PROGRESS NOTE    Danielle Saldivar Patient Status:  Inpatient    1950 MRN [id number]   [facility] 5NW-A Attending Nraa Lind MD   Hosp Day # 9 PCP Alla Jurado Daily  •  insulin detemir (LEVEMIR) 100 UNIT/ML flextouch 10 Units, 10 Units, Subcutaneous, Nightly  •  temazepam (RESTORIL) cap 15 mg, 15 mg, Oral, Nightly  •  hydrALAzine HCl (APRESOLINE) injection 5 mg, 5 mg, Intravenous, Q6H PRN  •  magnesium hydroxide 02/06/21  0817 02/07/21  0811 02/10/21  0644 02/10/21  1414 02/11/21  0514   RBC 3.35* 3.34* 3.50*  --  3.20* 2.74*   HGB 8.6* 8.7* 9.1*  --  8.3* 7.2*   HCT 28.2* 28.5* 29.4*  --  26.5* 22.6*   MCV 84.2 85.3 84.0  --  82.8 82.5   MCH 25.7* 26.0 26.0  -- Cocci N/A       Susceptibility    Staphylococcus aureus -  (no method available)     Cefazolin  Sensitive      Clindamycin <=0.25 Sensitive      Erythromycin <=0.25 Sensitive      Gentamicin <=0.5 Sensitive      Levofloxacin <=0.12 Sensitive      Oxacillin plaque in the right popliteal artery without hemodynamically significant narrowing. The origin of the right anterior tibial artery is likely occluded with reconstitution with collaterals in this region.  The right   anterior tibial artery is otherwise paten Impression: CONCLUSION:     1. There is occlusion of distal left popliteal artery below the knee.  There is occlusion of origin of the left anterior tibial artery with intermittent areas of reconstitution likely related to collaterals and visualization skin ulceration with soft tissue air, edema and inflammatory change that extends to the 5th metatarsal head and distal   metaphysis where there is suggestion of associated osteomyelitis with mild cortical destructive change and bone marrow edema.  There is renal following    PLAN:  - continue unasyn  - follow temps and WBC  - wound vac as per wound care and podiatry  - will need IV abx (Invanz) on dc; will need to place PICC line prior to dc. Patient prefers to go to the cancer center on dc for abx.   Case an

## 2021-02-11 NOTE — PLAN OF CARE
Assumed care of pt at 0730. He is alert/oriented x 4. Morphine given on previous shift with good result. PO Norco given prior to pt working with PT/OT. Art line removed per orders. Awaiting Dr Maicol Frias to determine plan of care.     Problem: Patient/Family glucose within target range  - Assess barriers to adequate nutritional intake and initiate nutrition consult as needed  - Instruct patient on self management of diabetes  Outcome: Progressing

## 2021-02-11 NOTE — PLAN OF CARE
SCr down-trending (1.28 mg/dL today; estimated CrCl 50.5 mL/min). Unasyn adjusted back to 3 grams IV every 8 hours as originally ordered.     Kenton White, PharmJIMMY  02/11/21 12:12 PM

## 2021-02-11 NOTE — OPERATIVE REPORT
[facility]    PATIENT'S NAME: Chelsi Lopez   ATTENDING PHYSICIAN: Karely Delgado M.D. OPERATING PHYSICIAN: Nilton Vilchis M.D.    PATIENT ACCOUNT#:   [de-identified]    LOCATION:  [address] [place] [address]  MEDICAL RECORD #:   [id number]       DATE OF BIRTH:  [birth year] Steinberg catheter placed by Surgery. He had the entire left leg prepped and draped in usual sterile technique with the gangrenous area covered with saline-soaked gauze, Kerlix, and Isabel Appl kept out of the operative field.   The patient had the right gr obtained with 6-0 Prolene suture. The vein was distended with the blood pressure elevated. With the use of a valvotome, the valves were lysed under direct vision.   The vein was then placed through a submuscular tunnel between the gastrocnemius muscles Cell Saver back. Estimated blood loss between 500 and 700 mL. The wounds were closed in multiple layers with 2-0 Vicryl and then skin was closed with staples and with 3-0 nylon distally as well as with staples. Sponge and instrument counts correct.   The

## 2021-02-11 NOTE — PROGRESS NOTES
[facility]  Progress note     Tommy Candelaria Patient Status:  Inpatient    1950 MRN [id number]   [facility] 5NW-A Attending Yevgeniy Martinez MD   Hosp Day # 9 PCP LUIS ALFREDO SHAFFER     Chief Complaint: foot infection  Subjective: Stat ALKPHO  --  206*  --    AST  --  24  --    ALT  --  42  --    BILT  --  1.0  --    TP  --  5.7*  --        Estimated Creatinine Clearance: 50.5 mL/min (based on SCr of 1.28 mg/dL).     Recent Labs   Lab 02/10/21  1414   PTP 19.2*   INR 1.57*       No resu discussed with pt, patient's wife at bedside and RN.     Ana Coleman MD  [facility]  Internal Medicine Hospitalist  2/11/2021

## 2021-02-11 NOTE — OCCUPATIONAL THERAPY NOTE
OCCUPATIONAL THERAPY EVALUATION - INPATIENT     Room Number: 6570/6258-N  Evaluation Date: 2/11/2021  Type of Evaluation: Initial  Presenting Problem: PVD, L foot infection, 2/4 I&D necrotic tissue, drain abscess and resection osteomyelitis 5th metatarsal metatarsal bone left foot     US Doppler UE 2/7:  CONCLUSION:    1. No evidence of DVT of the right upper extremity. 2. There is however superficial thrombosis involving the distal right cephalic vein and medial cubital vein.   Please see further details a touch:  intact    Communication: clear    Behavioral/Emotional/Social: pleasant    RANGE OF MOTION AND STRENGTH ASSESSMENT  Upper extremity ROM is within functional limits     Upper extremity strength is within functional limits     COORDINATION  Gross Mot and concerns addressed; Alarm set    ASSESSMENT     Patient is a 65year old male admitted on 2/2 with pain and redness L foot.  Admitted for L foot cellulitis, PVD.  2/4 s/p  Incision and debridement of infected necrotic tissue with drainage of abscess and commode    PLAN  OT Treatment Plan: Balance activities; Energy conservation/work simplification techniques;ADL training;Functional transfer training;UE strengthening/ROM; Patient/Family education;Patient/Family training;Equipment eval/education; Compensatory

## 2021-02-12 ENCOUNTER — APPOINTMENT (OUTPATIENT)
Dept: ULTRASOUND IMAGING | Facility: HOSPITAL | Age: 71
DRG: 252 | End: 2021-02-12
Attending: SURGERY
Payer: MEDICARE

## 2021-02-12 PROBLEM — L02.612 ABSCESS OF LEFT FOOT: Status: ACTIVE | Noted: 2021-02-12

## 2021-02-12 PROBLEM — M86.9: Status: ACTIVE | Noted: 2021-02-12

## 2021-02-12 LAB
ANION GAP SERPL CALC-SCNC: 7 MMOL/L (ref 0–18)
ANION GAP SERPL CALC-SCNC: 7 MMOL/L (ref 0–18)
BASOPHILS # BLD AUTO: 0.03 X10(3) UL (ref 0–0.2)
BASOPHILS NFR BLD AUTO: 0.2 %
BUN BLD-MCNC: 27 MG/DL (ref 7–18)
BUN BLD-MCNC: 29 MG/DL (ref 7–18)
BUN/CREAT SERPL: 12.2 (ref 10–20)
BUN/CREAT SERPL: 12.8 (ref 10–20)
CALCIUM BLD-MCNC: 7.3 MG/DL (ref 8.5–10.1)
CALCIUM BLD-MCNC: 7.7 MG/DL (ref 8.5–10.1)
CHLORIDE SERPL-SCNC: 102 MMOL/L (ref 98–112)
CHLORIDE SERPL-SCNC: 99 MMOL/L (ref 98–112)
CO2 SERPL-SCNC: 23 MMOL/L (ref 21–32)
CO2 SERPL-SCNC: 24 MMOL/L (ref 21–32)
CREAT BLD-MCNC: 2.11 MG/DL
CREAT BLD-MCNC: 2.38 MG/DL
DEPRECATED RDW RBC AUTO: 51.7 FL (ref 35.1–46.3)
EOSINOPHIL # BLD AUTO: 0.13 X10(3) UL (ref 0–0.7)
EOSINOPHIL NFR BLD AUTO: 1 %
ERYTHROCYTE [DISTWIDTH] IN BLOOD BY AUTOMATED COUNT: 16.9 % (ref 11–15)
GLUCOSE BLD-MCNC: 130 MG/DL (ref 70–99)
GLUCOSE BLD-MCNC: 134 MG/DL (ref 70–99)
GLUCOSE BLD-MCNC: 139 MG/DL (ref 70–99)
GLUCOSE BLD-MCNC: 156 MG/DL (ref 70–99)
GLUCOSE BLD-MCNC: 163 MG/DL (ref 70–99)
GLUCOSE BLD-MCNC: 176 MG/DL (ref 70–99)
HCT VFR BLD AUTO: 23.5 %
HGB BLD-MCNC: 7.1 G/DL
HGB BLD-MCNC: 7.2 G/DL
IMM GRANULOCYTES # BLD AUTO: 0.08 X10(3) UL (ref 0–1)
IMM GRANULOCYTES NFR BLD: 0.6 %
LYMPHOCYTES # BLD AUTO: 1.38 X10(3) UL (ref 1–4)
LYMPHOCYTES NFR BLD AUTO: 10.4 %
MCH RBC QN AUTO: 25.9 PG (ref 26–34)
MCHC RBC AUTO-ENTMCNC: 30.2 G/DL (ref 31–37)
MCV RBC AUTO: 85.8 FL
MONOCYTES # BLD AUTO: 1.41 X10(3) UL (ref 0.1–1)
MONOCYTES NFR BLD AUTO: 10.6 %
NEUTROPHILS # BLD AUTO: 10.23 X10 (3) UL (ref 1.5–7.7)
NEUTROPHILS # BLD AUTO: 10.23 X10(3) UL (ref 1.5–7.7)
NEUTROPHILS NFR BLD AUTO: 77.2 %
OSMOLALITY SERPL CALC.SUM OF ELEC: 279 MOSM/KG (ref 275–295)
OSMOLALITY SERPL CALC.SUM OF ELEC: 281 MOSM/KG (ref 275–295)
PLATELET # BLD AUTO: 213 10(3)UL (ref 150–450)
POTASSIUM SERPL-SCNC: 4.6 MMOL/L (ref 3.5–5.1)
POTASSIUM SERPL-SCNC: 4.6 MMOL/L (ref 3.5–5.1)
RBC # BLD AUTO: 2.74 X10(6)UL
SODIUM SERPL-SCNC: 130 MMOL/L (ref 136–145)
SODIUM SERPL-SCNC: 132 MMOL/L (ref 136–145)
WBC # BLD AUTO: 13.3 X10(3) UL (ref 4–11)

## 2021-02-12 PROCEDURE — 99233 SBSQ HOSP IP/OBS HIGH 50: CPT | Performed by: INTERNAL MEDICINE

## 2021-02-12 PROCEDURE — 99223 1ST HOSP IP/OBS HIGH 75: CPT | Performed by: UROLOGY

## 2021-02-12 PROCEDURE — 30233N1 TRANSFUSION OF NONAUTOLOGOUS RED BLOOD CELLS INTO PERIPHERAL VEIN, PERCUTANEOUS APPROACH: ICD-10-PCS | Performed by: INTERNAL MEDICINE

## 2021-02-12 PROCEDURE — B548ZZA ULTRASONOGRAPHY OF SUPERIOR VENA CAVA, GUIDANCE: ICD-10-PCS | Performed by: INTERNAL MEDICINE

## 2021-02-12 PROCEDURE — 99232 SBSQ HOSP IP/OBS MODERATE 35: CPT | Performed by: INTERNAL MEDICINE

## 2021-02-12 PROCEDURE — 93970 EXTREMITY STUDY: CPT | Performed by: SURGERY

## 2021-02-12 PROCEDURE — 02HV33Z INSERTION OF INFUSION DEVICE INTO SUPERIOR VENA CAVA, PERCUTANEOUS APPROACH: ICD-10-PCS | Performed by: INTERNAL MEDICINE

## 2021-02-12 RX ORDER — SODIUM CHLORIDE 9 MG/ML
INJECTION, SOLUTION INTRAVENOUS ONCE
Status: COMPLETED | OUTPATIENT
Start: 2021-02-12 | End: 2021-02-12

## 2021-02-12 RX ORDER — ALBUMIN (HUMAN) 12.5 G/50ML
25 SOLUTION INTRAVENOUS ONCE
Status: COMPLETED | OUTPATIENT
Start: 2021-02-12 | End: 2021-02-12

## 2021-02-12 RX ORDER — TAMSULOSIN HYDROCHLORIDE 0.4 MG/1
0.4 CAPSULE ORAL DAILY
Status: DISCONTINUED | OUTPATIENT
Start: 2021-02-12 | End: 2021-02-20

## 2021-02-12 RX ORDER — LIDOCAINE HYDROCHLORIDE 10 MG/ML
5 INJECTION, SOLUTION EPIDURAL; INFILTRATION; INTRACAUDAL; PERINEURAL ONCE
Status: DISCONTINUED | OUTPATIENT
Start: 2021-02-12 | End: 2021-02-20

## 2021-02-12 RX ORDER — FUROSEMIDE 10 MG/ML
20 INJECTION INTRAMUSCULAR; INTRAVENOUS ONCE
Status: COMPLETED | OUTPATIENT
Start: 2021-02-12 | End: 2021-02-12

## 2021-02-12 NOTE — PROGRESS NOTES
@8519 Received pt from CCU, oriented to [address] Aurelia Kaplan, wife at bedside. Dressing to left leg, clean dry and intact. Wound vac to left foot intact, SS drainage noted in tubing and canister. Pt due to void @ 1830 per CCU RN.   Swollen penis noted, called CCU RN

## 2021-02-12 NOTE — PROGRESS NOTES
[facility] Pharmacy Note:  Renal Adjustment for ampicillin/sulbactam (Srinivasan Boyle)    Julio Bundy is a 65year old patient who has been prescribed ampicillin/sulbactam (UNASYN) 3 g every 8 hrs.   The estimated creatinine clearance is 30.6 mL/min (A) (based on SCr of

## 2021-02-12 NOTE — PROGRESS NOTES
[facility]  Nephrology Progress Note    Tirso Casillas Patient Status:  Inpatient    1950 MRN [id number]   [facility] 5NW-A Attending Mohit Brar MD   Hosp Day # 10 PCP LUIS ALFREDO SHAFFER       SUBJECTIVE:  Patient stable this AM in no acute distress, well developed, well nourished, ambulating without difficulty, normal communication ability 139*       Recent Labs   Lab 02/10/21  1414   ALT 42   AST 24   ALB 1.7*       Recent Labs   Lab 02/11/21  0712 02/11/21  1122 02/11/21  1710 02/11/21  2229 02/12/21  0747   PGLU 137* 196* 183* 230* 176*       Meds:     •  Ampicillin-Sulbactam Sodium (UNAS MAGNESIA) 400 MG/5ML suspension 30 mL, 30 mL, Oral, BID PRN    •  Metoprolol Succinate ER (Toprol XL) 24 hr tab 25 mg, 25 mg, Oral, Daily Beta Blocker    •  atorvastatin (LIPITOR) tab 40 mg, 40 mg, Oral, Nightly    •  PEG 3350 (MIRALAX) powder packet 17 g,

## 2021-02-12 NOTE — WOUND PROGRESS NOTE
[facility]  Report of Inpatient Wound Care Progress Note    Kinza Tripathi Patient Status:  Inpatient    1950 MRN [id number]   [facility] 7NE-A Attending Junior Burgess MD   Hosp Day # 10 PCP LUIS ALFREDO SHAFFER       SUBJECTIVE:  No compl EMR    ASSESSMENT/ RECOMMENDATIONS:  Patient seen bedside. Observation: removed the Negative Pressure Wound Therapy dressing without incident. Wound to the L lateral foot continues to heal, periwound stable.   Re-applied VAC to the L lateral foot, 1 pie

## 2021-02-12 NOTE — CM/SW NOTE
Pt's dc plan is written in FILI Seo's note below. Pt's first wound care clinic appointment is on Weds 2/17. If pt is not dc'd by then, he will have to call and reschedule his wound care clinic appt.

## 2021-02-12 NOTE — PROGRESS NOTES
No complaints- slight hypotension this AM- Hgb-7. 1- will give one unit prbc. Incisions clean/dry. Neuro intact. Graft patent.  CPM

## 2021-02-12 NOTE — CONSULTS
[facility]     Report of Consultation    Iggyshorty [address]  [phone]       Bree Kulkarni Patient Status:  Inpatient    1950 MRN [id number]   [facility] 7NE-A Attending Mimi Bagley MD   Hosp Day # 10 PCP LUIS ALFREDO TRUJILLO (Porcine) 5000 UNIT/ML injection 5,000 Units, 5,000 Units, Subcutaneous, Q12H [address]  •  acetaminophen (TYLENOL) tab 650 mg, 650 mg, Oral, Q4H PRN **OR** HYDROcodone-acetaminophen (NORCO) 5-325 MG per tab 1 tablet, 1 tablet, Oral, Q4H PRN **OR** HYDROcodone-a Min PRN **OR** Glucose-Vitamin C (DEX-4) chewable tab 8 tablet, 8 tablet, Oral, Q15 Min PRN  •  Insulin Aspart Pen (NOVOLOG) 100 UNIT/ML flexpen 2-10 Units, 2-10 Units, Subcutaneous, TID CC and HS  •  ondansetron HCl (ZOFRAN) injection 4 mg, 4 mg, Intraven the last 168 hours. Hospital Encounter on 02/02/21   1. ANAEROBIC CULTURE     Status: Abnormal    Collection Time: 02/04/21  5:13 PM    Specimen: Foot,left;  Abscess   Result Value Ref Range    Anaerobic Culture 2+ growth Bacteroides thetaiotaomicron (A) N

## 2021-02-12 NOTE — PROGRESS NOTES
[facility] Cardiology Progress Note    Subjective: Some surgical pain, but all in all he feels better than yesterday. Denies any sob.      Objective:  /54 (BP Location: Right arm)   Pulse 71   Temp 98.5 °F (36.9 °C) (Oral)   Resp 15   Ht 172.7 cm (5' 8\ surgery well. · ICMP with LVEF 40-45% by echo, PAS 57. · DM  · dyslipidemia    Plan:     · Continues on DAPT  · Plans for RBC today, lasix 20 mg post ordered  · Follow volume status/ renal function. Diurese as tolerated.   · Continue medical therapy for

## 2021-02-12 NOTE — SLP NOTE
ADULT SWALLOWING EVALUATION    ASSESSMENT    ASSESSMENT/OVERALL IMPRESSION:  Patient referred for swallowing evaluation after being noted to cough with swallowing breakfast.  Patient admitted due to foot infection.   He has medical history including DM and (peripheral artery disease) (HCC)    Insomnia    Thrombophlebitis    Essential hypertension    Anemia      Past Medical History  Past Medical History:   Diagnosis Date   • Diabetes ([address]    • Neuropathy           Diet Prior to Admission: Regular; Thin liqui you for your referral.   If you have any questions, please contact Nirmala Garcia 92  Pager [phone]    Prior to entering room, SLP donned appropriate PPE for Patient level of isolation including surgical mask, gloves, and eyewear.

## 2021-02-12 NOTE — PLAN OF CARE
Assumed care at [address]. PRN Donald for L leg pain 5/10. Ace wrap & wound vac to L leg/foot clean, dry, intact. SS output in wound vac chamber. Patient had not voided since ballard was taken out at 1230. Patient attempted but was unable.  Bladder scan showing Glucose as ordered  - Assess for signs and symptoms of hyperglycemia and hypoglycemia  - Administer ordered medications to maintain glucose within target range  - Assess barriers to adequate nutritional intake and initiate nutrition consult as needed  - In

## 2021-02-12 NOTE — PROGRESS NOTES
Assumed pt care @ 0730. Pt mildly drowsy, oriented x 4.   BP in low 90's. Penis/scrotum swollen, pt's bilateral upper legs and right lower leg edematous (unable to assess left lower leg with surgical dressing).    Low urine output in ballard bag (250cc ove

## 2021-02-12 NOTE — PROGRESS NOTES
[facility]  Progress note     Delinda Angelucci Patient Status:  Inpatient    1950 MRN [id number]   [facility] 5NW-A Attending Cierra Fonseca MD   Hosp Day # 10 PCP LUIS ALFREDO SHAFFER     Chief Complaint: foot infection  Subjective: Arley Marsh 66 64 35*   GFRNAA 57* 56* 30*   CA 7.3* 7.5* 7.7*   ALB 1.7*  --   --     139 132*   K 3.9 4.5 4.6    109 102   CO2 24.0 25.0 23.0   ALKPHO 206*  --   --    AST 24  --   --    ALT 42  --   --    BILT 1.0  --   --    TP 5.7*  --   --        Est days  3. 1 unit of PRBC being transfused today by vascular surgeon as hemoglobin 7.1 and 1 dose of IV Lasix 20 mg being given after transfusion  8. Hypoalbuminemia, scrotal edema, mild hypotension and decreased urinary output overnight  1.  We will give alb

## 2021-02-13 LAB
ANION GAP SERPL CALC-SCNC: 8 MMOL/L (ref 0–18)
ANTIBODY SCREEN: NEGATIVE
BASOPHILS # BLD AUTO: 0.03 X10(3) UL (ref 0–0.2)
BASOPHILS NFR BLD AUTO: 0.3 %
BLOOD TYPE BARCODE: 5100
BUN BLD-MCNC: 30 MG/DL (ref 7–18)
BUN/CREAT SERPL: 12.1 (ref 10–20)
CALCIUM BLD-MCNC: 7.8 MG/DL (ref 8.5–10.1)
CHLORIDE SERPL-SCNC: 99 MMOL/L (ref 98–112)
CO2 SERPL-SCNC: 23 MMOL/L (ref 21–32)
CREAT BLD-MCNC: 2.48 MG/DL
DEPRECATED RDW RBC AUTO: 50.4 FL (ref 35.1–46.3)
EOSINOPHIL # BLD AUTO: 0.19 X10(3) UL (ref 0–0.7)
EOSINOPHIL NFR BLD AUTO: 1.8 %
ERYTHROCYTE [DISTWIDTH] IN BLOOD BY AUTOMATED COUNT: 17.1 % (ref 11–15)
GLUCOSE BLD-MCNC: 152 MG/DL (ref 70–99)
GLUCOSE BLD-MCNC: 158 MG/DL (ref 70–99)
GLUCOSE BLD-MCNC: 163 MG/DL (ref 70–99)
GLUCOSE BLD-MCNC: 205 MG/DL (ref 70–99)
GLUCOSE BLD-MCNC: 223 MG/DL (ref 70–99)
HCT VFR BLD AUTO: 21.8 %
HGB BLD-MCNC: 7 G/DL
HGB BLD-MCNC: 8.2 G/DL
IMM GRANULOCYTES # BLD AUTO: 0.05 X10(3) UL (ref 0–1)
IMM GRANULOCYTES NFR BLD: 0.5 %
LYMPHOCYTES # BLD AUTO: 1.55 X10(3) UL (ref 1–4)
LYMPHOCYTES NFR BLD AUTO: 14.7 %
MCH RBC QN AUTO: 27 PG (ref 26–34)
MCHC RBC AUTO-ENTMCNC: 32.1 G/DL (ref 31–37)
MCV RBC AUTO: 84.2 FL
MONOCYTES # BLD AUTO: 1.06 X10(3) UL (ref 0.1–1)
MONOCYTES NFR BLD AUTO: 10 %
NEUTROPHILS # BLD AUTO: 7.7 X10 (3) UL (ref 1.5–7.7)
NEUTROPHILS # BLD AUTO: 7.7 X10(3) UL (ref 1.5–7.7)
NEUTROPHILS NFR BLD AUTO: 72.7 %
OSMOLALITY SERPL CALC.SUM OF ELEC: 279 MOSM/KG (ref 275–295)
PLATELET # BLD AUTO: 186 10(3)UL (ref 150–450)
POTASSIUM SERPL-SCNC: 4.6 MMOL/L (ref 3.5–5.1)
RBC # BLD AUTO: 2.59 X10(6)UL
RH BLOOD TYPE: POSITIVE
SODIUM SERPL-SCNC: 130 MMOL/L (ref 136–145)
WBC # BLD AUTO: 10.6 X10(3) UL (ref 4–11)

## 2021-02-13 PROCEDURE — 99232 SBSQ HOSP IP/OBS MODERATE 35: CPT | Performed by: INTERNAL MEDICINE

## 2021-02-13 PROCEDURE — 99233 SBSQ HOSP IP/OBS HIGH 50: CPT | Performed by: INTERNAL MEDICINE

## 2021-02-13 PROCEDURE — 99231 SBSQ HOSP IP/OBS SF/LOW 25: CPT | Performed by: UROLOGY

## 2021-02-13 RX ORDER — FUROSEMIDE 10 MG/ML
20 INJECTION INTRAMUSCULAR; INTRAVENOUS ONCE
Status: COMPLETED | OUTPATIENT
Start: 2021-02-13 | End: 2021-02-13

## 2021-02-13 RX ORDER — ALBUMIN (HUMAN) 12.5 G/50ML
25 SOLUTION INTRAVENOUS ONCE
Status: COMPLETED | OUTPATIENT
Start: 2021-02-13 | End: 2021-02-13

## 2021-02-13 RX ORDER — SODIUM CHLORIDE 9 MG/ML
INJECTION, SOLUTION INTRAVENOUS ONCE
Status: COMPLETED | OUTPATIENT
Start: 2021-02-13 | End: 2021-02-13

## 2021-02-13 NOTE — PROGRESS NOTES
[facility]  Urology Consult Follow-Up Note    [place] Yariel Patient Status:  Inpatient    1950 MRN [id number]   [facility] 7NE-A Attending Shiva Dominique MD   Hosp Day # 11 PCP LUIS ALFREDO SHAFFER     Subjective:   No ac Specimen: Foot,left;  Abscess   Result Value Ref Range    Aerobic Culture Result  N/A     Mixture of organisms suggestive of normal skin flo    Aerobic Culture Result 2+ growth Staphylococcus aureus (A) N/A    Aerobic Culture Result 2+ growth Streptococcu

## 2021-02-13 NOTE — PROGRESS NOTES
INFECTIOUS DISEASE PROGRESS NOTE    Breeshorty Kulkarni Patient Status:  Inpatient    1950 MRN [id number]   [facility] 5NW-A Attending Sen Aguilar MD   Hosp Day # 10 PCP Cecelia Hong mg, Oral, Q24H  •  insulin detemir (LEVEMIR) 100 UNIT/ML flextouch 10 Units, 10 Units, Subcutaneous, Nightly  •  temazepam (RESTORIL) cap 15 mg, 15 mg, Oral, Nightly  •  hydrALAzine HCl (APRESOLINE) injection 5 mg, 5 mg, Intravenous, Q6H PRN  •  magnesium scrotal edema.     Laboratory Data:    Recent Labs   Lab 02/06/21  0817 02/07/21  0811 02/07/21  0811 02/10/21  1414 02/11/21  0514 02/12/21  1204 02/12/21  1743   RBC 3.34* 3.50*  --  3.20* 2.74* 2.74*  --    HGB 8.7* 9.1*  --  8.3* 7.2* 7.1* 7.2*   HCT 28 Result 2+ growth Staphylococcus aureus (A) N/A    Aerobic Culture Result 2+ growth Streptococcus constellatus (A) N/A    Aerobic Smear 2+ WBCs seen N/A    Aerobic Smear 3+ Gram Positive Cocci N/A       Susceptibility    Staphylococcus aureus -  (no method to moderate mixed irregular atherosclerotic plaque in right superficial femoral artery without hemodynamically significant narrowing.  There is mild   diffuse irregular noncalcified plaque in the right popliteal artery without hemodynamically significant na unremarkable. Mild urinary bladder distention. Pelvic phleboliths. Unremarkable prostate gland. No   lymphadenopathy in the pelvis. Nonspecific soft tissue edema along the retroperitoneum. Impression: CONCLUSION:     1.  There is occlusion of distal lef osteomyelitis, wound to lateral aspect of left foot. FINDINGS:   There is evidence of an amputation of the 5th toe from the level of the proximal phalanx.  Along the surgical bed there is evidence of skin ulceration with soft tissue air, edema and infla due to above. 3. Type 2 DM  4. Peripheral neuropathy  5. PVD- vascular following. S/p angiogram 2/8. S/p left leg revascularization 2/10  6.  Renal failure- suspect CKD but no baseline labs available, renal following    PLAN:  - continue unasyn  - follow t

## 2021-02-13 NOTE — PROGRESS NOTES
[facility]  Progress note     Taylor Degroot Patient Status:  Inpatient    1950 MRN [id number]   [facility] 5NW-A Attending Rk Phillip MD   Hosp Day # 6 PCP LUIS ALFREDO SHAFFER     Chief Complaint: foot infection  Subjective:   P > 132* 130* 130*   K 3.9   < > 4.6 4.6 4.6      < > 102 99 99   CO2 24.0   < > 23.0 24.0 23.0   ALKPHO 206*  --   --   --   --    AST 24  --   --   --   --    ALT 42  --   --   --   --    BILT 1.0  --   --   --   --    TP 5.7*  --   --   --   -- nephrology Dr. Suraj Tierney on consult  7. Normocytic anemia due to iron deficiency anemia  1.  Follow CBC  2. Status post IV Venofer daily for 3 days  3. 1 unit of PRBC being transfused on 2/12/2021 by vascular surgeon as hemoglobin 7.1 and 1 dose of IV Lasix 20 m

## 2021-02-13 NOTE — PHYSICAL THERAPY NOTE
Attempted to see pt for PT services. Per RN, pt is receiving 1 unit of blood at this time. Will attempt to see pt later as able, and as pt is appropriate. RN aware.

## 2021-02-13 NOTE — PROGRESS NOTES
Patient seen and examined this am. Feels well, no complaints. Pain well controlled. Able to move LLE, senation intact. Dressing CDI, would vac in place. Hgb this am 7, received 1 unit PRBC. Follow CBC. Daily, and PRN, dry dressing change to LLE.  Discussed

## 2021-02-13 NOTE — PROGRESS NOTES
[facility]  Nephrology Progress Note    Clif Velazquez Attending:  Domonique Rosado MD       Assessment and Plan:    1) AMADO- due to alaina-op fluid shifts / lower BP's / urinary retention- expect improvement after IVF / Steinberg placement.  Edematous- IV HL'ed 23.0 02/13/2021     02/13/2021    CA 7.8 02/13/2021    PGLU 163 02/12/2021       Imaging: All imaging studies reviewed.     Meds:     •  0.9% NaCl infusion, , Intravenous, Once    •  tamsulosin HCl (FLOMAX) cap 0.4 mg, 0.4 mg, Oral, Daily    •  Ampi magnesium hydroxide (MILK OF MAGNESIA) 400 MG/5ML suspension 30 mL, 30 mL, Oral, BID PRN    •  Metoprolol Succinate ER (Toprol XL) 24 hr tab 25 mg, 25 mg, Oral, Daily Beta Blocker    •  atorvastatin (LIPITOR) tab 40 mg, 40 mg, Oral, Nightly    •  PEG 3350

## 2021-02-13 NOTE — PROGRESS NOTES
Stat US BLE were ordered but pt was getting piccline at bedside. US department made aware. [facility].     @1900 US started at bedside.

## 2021-02-13 NOTE — PROGRESS NOTES
Assumed care at 1. Tele SR, on room air. Prn norco for LLE pain with relief. US BLE completed. LLE re-dressed. Wound vac to left foot w/SS drainage. Scrotum swollen, elevated on towel. Steinberg draining dain urine. No BM since 2/8, +BS, + gas.  Supp

## 2021-02-13 NOTE — PROGRESS NOTES
Dr. Devan Jackson updated on pt status. Still low urine output after blood transfusion and IV lasix. Per Dr. Devan Jackson, Albumin x 1, BMP ordered. Above tasks endorsed to oncoming shift, also endorsed to update Dr. Maria Eugenia Payne regarding HGB 7.1 post transfusion.

## 2021-02-13 NOTE — PROGRESS NOTES
In good spirits - only mild left groin pain when lifts leg    Tmax 99.3  124/63  72 regular    Lungs clear  Ht RRR  abd soft  Ext groin site looks good - entire left leg wrapped - dressing clean and dry - denies pain in left foot    30/2.5    Hgb 8.2    A/

## 2021-02-14 LAB
ANION GAP SERPL CALC-SCNC: 9 MMOL/L (ref 0–18)
BILIRUB UR QL STRIP.AUTO: NEGATIVE
BLOOD TYPE BARCODE: 5100
BUN BLD-MCNC: 40 MG/DL (ref 7–18)
BUN/CREAT SERPL: 14 (ref 10–20)
CALCIUM BLD-MCNC: 7.6 MG/DL (ref 8.5–10.1)
CHLORIDE SERPL-SCNC: 99 MMOL/L (ref 98–112)
CO2 SERPL-SCNC: 24 MMOL/L (ref 21–32)
COLOR UR AUTO: YELLOW
CREAT BLD-MCNC: 2.85 MG/DL
DEPRECATED RDW RBC AUTO: 53.5 FL (ref 35.1–46.3)
EOSINOPHILS,URINE: NEGATIVE
ERYTHROCYTE [DISTWIDTH] IN BLOOD BY AUTOMATED COUNT: 17.7 % (ref 11–15)
GLUCOSE BLD-MCNC: 131 MG/DL (ref 70–99)
GLUCOSE BLD-MCNC: 139 MG/DL (ref 70–99)
GLUCOSE BLD-MCNC: 142 MG/DL (ref 70–99)
GLUCOSE BLD-MCNC: 233 MG/DL (ref 70–99)
GLUCOSE BLD-MCNC: 250 MG/DL (ref 70–99)
GLUCOSE UR STRIP.AUTO-MCNC: NEGATIVE MG/DL
HCT VFR BLD AUTO: 24.7 %
HGB BLD-MCNC: 7.8 G/DL
KETONES UR STRIP.AUTO-MCNC: NEGATIVE MG/DL
LEUKOCYTE ESTERASE UR QL STRIP.AUTO: NEGATIVE
MCH RBC QN AUTO: 27.1 PG (ref 26–34)
MCHC RBC AUTO-ENTMCNC: 31.6 G/DL (ref 31–37)
MCV RBC AUTO: 85.8 FL
NITRITE UR QL STRIP.AUTO: NEGATIVE
OSMOLALITY SERPL CALC.SUM OF ELEC: 286 MOSM/KG (ref 275–295)
PH UR STRIP.AUTO: 6 [PH] (ref 4.5–8)
PLATELET # BLD AUTO: 189 10(3)UL (ref 150–450)
POTASSIUM SERPL-SCNC: 4.4 MMOL/L (ref 3.5–5.1)
PROT UR STRIP.AUTO-MCNC: NEGATIVE MG/DL
RBC # BLD AUTO: 2.88 X10(6)UL
RBC #/AREA URNS AUTO: >10 /HPF
SODIUM SERPL-SCNC: 132 MMOL/L (ref 136–145)
SODIUM SERPL-SCNC: 76 MMOL/L
SP GR UR STRIP.AUTO: <1.005 (ref 1–1.03)
UROBILINOGEN UR STRIP.AUTO-MCNC: <2 MG/DL
WBC # BLD AUTO: 9.9 X10(3) UL (ref 4–11)

## 2021-02-14 PROCEDURE — 99232 SBSQ HOSP IP/OBS MODERATE 35: CPT | Performed by: INTERNAL MEDICINE

## 2021-02-14 PROCEDURE — 99233 SBSQ HOSP IP/OBS HIGH 50: CPT | Performed by: INTERNAL MEDICINE

## 2021-02-14 PROCEDURE — 99232 SBSQ HOSP IP/OBS MODERATE 35: CPT | Performed by: NURSE PRACTITIONER

## 2021-02-14 RX ORDER — FUROSEMIDE 10 MG/ML
60 INJECTION INTRAMUSCULAR; INTRAVENOUS
Status: DISCONTINUED | OUTPATIENT
Start: 2021-02-14 | End: 2021-02-14

## 2021-02-14 RX ORDER — FUROSEMIDE 10 MG/ML
60 INJECTION INTRAMUSCULAR; INTRAVENOUS ONCE
Status: COMPLETED | OUTPATIENT
Start: 2021-02-14 | End: 2021-02-14

## 2021-02-14 RX ORDER — FUROSEMIDE 10 MG/ML
60 INJECTION INTRAMUSCULAR; INTRAVENOUS
Status: DISCONTINUED | OUTPATIENT
Start: 2021-02-15 | End: 2021-02-15

## 2021-02-14 NOTE — PROGRESS NOTES
Patient seen at bedside with Dr. Jane Mccall was taken down. Patient has good granulation tissue would continue with wound VAC to allow the area to fill in.   Once this occurs then referral to plastics for skin graft would be appropriate

## 2021-02-14 NOTE — PROGRESS NOTES
[facility]  Progress note     Rafita Lane Patient Status:  Inpatient    1950 MRN [id number]   [facility] 5NW-A Attending Ofe Lomeli MD   Hosp Day # 12 PCP LUIS ALFREDO SHAFFER     Chief Complaint: foot infection  Subjective:   P GFRAA 66   < > 30* 29* 24*   GFRNAA 57*   < > 26* 25* 21*   CA 7.3*   < > 7.3* 7.8* 7.6*   ALB 1.7*  --   --   --   --       < > 130* 130* 132*   K 3.9   < > 4.6 4.6 4.4      < > 99 99 99   CO2 24.0   < > 24.0 23.0 24.0   ALKPHO 206*  --   -- cardiology including aspirin, statin, Plavix, metoprolol  6. Acute kidney injury on chronic kidney disease, anasarca, scrotal edema  1. Seen by nephrology Dr. John Montes on consult  2. Nephrologist planning diuretic trial today due to generalized edema  7.  Normo

## 2021-02-14 NOTE — PROGRESS NOTES
[facility]  Cardiology Progress Note    Zacarias Camara Patient Status:  Inpatient    1950 MRN [id number]   [facility] 7NE-A Attending Roger Sloan MD   Hosp Day # 12 PCP LUIS ALFREDO SHAFFER       Subjective:  No chest pain or shortness o Q12H [address]   • aspirin  81 mg Oral Daily   • Clopidogrel Bisulfate  75 mg Oral Daily   • docusate sodium  100 mg Oral BID   • Pantoprazole Sodium  40 mg Oral Q24H   • insulin detemir  10 Units Subcutaneous Nightly   • temazepam  15 mg Oral Nightly   • metopr

## 2021-02-14 NOTE — PLAN OF CARE
Assumed care of pt.  At 1  A&O x 4  Room Air- lungs clear/diminihsed- non-productive strong cough  NSR on telemetry  Non-pitting edema in BUE  Scrotal and penile edema- elevated with pillows  Abdomen distended- pt have soft stool- refusing stool softener range  Description: INTERVENTIONS:  - Monitor Blood Glucose as ordered  - Assess for signs and symptoms of hyperglycemia and hypoglycemia  - Administer ordered medications to maintain glucose within target range  - Assess barriers to adequate nutritional i

## 2021-02-14 NOTE — PHYSICAL THERAPY NOTE
PHYSICAL THERAPY TREATMENT NOTE - INPATIENT    Room Number: 2231/7220-E     Session: 1   Number of Visits to Meet Established Goals: 5    Presenting Problem: Diabetic L foot wound infection       History related to current admission:  Pt was admitted from [facility]    PAIN ASSESSMENT   Ratin  Location: B le's       BALANCE                                                                                                                     Static Sitting: Good  Dynamic Sitting: Fair +           Static Standi follow. Pt sit to stand from [facility] to RW min assist with verbal cues for hand placement and to maintain NWB L LE. Pt amb x 10 feet with RW with NWB L LE and min assist and chair follow. Pt rests L foot on ground on occasion. Pt fatigued and was seated.   P EOB to/from [facility] at assistance level: supervision      Goal #3 Patient is able to ambulate 40 feet with assist device: walker - rolling at assistance level: supervision      Goal #4     Goal #5     Goal #6     Goal Comments: Goals established on 2/11/2021,

## 2021-02-14 NOTE — PROGRESS NOTES
[facility]  Nephrology Progress Note    Clif Velazquez Attending:  Domonique Rosado MD       Assessment and Plan:    1) AMADO- multifactorial nonoliguric ATN due to alaina-op fluid shifts / lower BP's / urinary retention; no other clear insults; no other con WBC 9.9 02/14/2021    HGB 7.8 02/14/2021    HCT 24.7 02/14/2021    .0 02/14/2021    CREATSERUM 2.85 02/14/2021    BUN 40 02/14/2021     02/14/2021    K 4.4 02/14/2021    CL 99 02/14/2021    CO2 24.0 02/14/2021     02/14/2021    CA 7.6 0 5 mg, Intravenous, Q6H PRN    •  magnesium hydroxide (MILK OF MAGNESIA) 400 MG/5ML suspension 30 mL, 30 mL, Oral, BID PRN    •  Metoprolol Succinate ER (Toprol XL) 24 hr tab 25 mg, 25 mg, Oral, Daily Beta Blocker    •  atorvastatin (LIPITOR) tab 40 mg, 40

## 2021-02-14 NOTE — PLAN OF CARE
Assumed care at 0700. No acute issues. Remains edematous. IV lasix x 1, Albumin given. Steinberg draining adequate uop. Left leg w/ staple. Left foot wound vac intact. Refuses any pain medication. IV abx as ordered.    Received 1u PRBCs this am for HG

## 2021-02-14 NOTE — PLAN OF CARE
Wound vac removed off left foot at bedside by Dr. Johnny Kelley and Keerthi Butt. Wet to dry placed per their order. Clarified that patient is to remain NWB on left foot. Wound care to see tomorrow to replace wound vac to foot.

## 2021-02-14 NOTE — PROGRESS NOTES
Patient seen and examined this am. No complaints of pain to LLE, sensation intact, extremity warm, patient able to lift leg and wiggle his toes. Left groin dressing with serous drainage noted. Kerlex wrap to left leg CDI.  Continue daily and PRN dressing ch

## 2021-02-15 LAB
ANION GAP SERPL CALC-SCNC: 9 MMOL/L (ref 0–18)
BASOPHILS # BLD AUTO: 0.03 X10(3) UL (ref 0–0.2)
BASOPHILS NFR BLD AUTO: 0.4 %
BUN BLD-MCNC: 38 MG/DL (ref 7–18)
BUN/CREAT SERPL: 16.2 (ref 10–20)
CALCIUM BLD-MCNC: 7.5 MG/DL (ref 8.5–10.1)
CHLORIDE SERPL-SCNC: 101 MMOL/L (ref 98–112)
CO2 SERPL-SCNC: 25 MMOL/L (ref 21–32)
CREAT BLD-MCNC: 2.34 MG/DL
DEPRECATED RDW RBC AUTO: 53.4 FL (ref 35.1–46.3)
EOSINOPHIL # BLD AUTO: 0.16 X10(3) UL (ref 0–0.7)
EOSINOPHIL NFR BLD AUTO: 2 %
ERYTHROCYTE [DISTWIDTH] IN BLOOD BY AUTOMATED COUNT: 18.5 % (ref 11–15)
GLUCOSE BLD-MCNC: 163 MG/DL (ref 70–99)
GLUCOSE BLD-MCNC: 165 MG/DL (ref 70–99)
GLUCOSE BLD-MCNC: 166 MG/DL (ref 70–99)
GLUCOSE BLD-MCNC: 231 MG/DL (ref 70–99)
GLUCOSE BLD-MCNC: 241 MG/DL (ref 70–99)
HCT VFR BLD AUTO: 24.3 %
HGB BLD-MCNC: 7.9 G/DL
IMM GRANULOCYTES # BLD AUTO: 0.05 X10(3) UL (ref 0–1)
IMM GRANULOCYTES NFR BLD: 0.6 %
LYMPHOCYTES # BLD AUTO: 1.11 X10(3) UL (ref 1–4)
LYMPHOCYTES NFR BLD AUTO: 13.7 %
MCH RBC QN AUTO: 27.5 PG (ref 26–34)
MCHC RBC AUTO-ENTMCNC: 32.5 G/DL (ref 31–37)
MCV RBC AUTO: 84.7 FL
MONOCYTES # BLD AUTO: 0.94 X10(3) UL (ref 0.1–1)
MONOCYTES NFR BLD AUTO: 11.6 %
NEUTROPHILS # BLD AUTO: 5.82 X10 (3) UL (ref 1.5–7.7)
NEUTROPHILS # BLD AUTO: 5.82 X10(3) UL (ref 1.5–7.7)
NEUTROPHILS NFR BLD AUTO: 71.7 %
OSMOLALITY SERPL CALC.SUM OF ELEC: 293 MOSM/KG (ref 275–295)
PLATELET # BLD AUTO: 214 10(3)UL (ref 150–450)
POTASSIUM SERPL-SCNC: 3.6 MMOL/L (ref 3.5–5.1)
POTASSIUM SERPL-SCNC: 3.6 MMOL/L (ref 3.5–5.1)
RBC # BLD AUTO: 2.87 X10(6)UL
SODIUM SERPL-SCNC: 135 MMOL/L (ref 136–145)
WBC # BLD AUTO: 8.1 X10(3) UL (ref 4–11)

## 2021-02-15 PROCEDURE — 99233 SBSQ HOSP IP/OBS HIGH 50: CPT | Performed by: INTERNAL MEDICINE

## 2021-02-15 PROCEDURE — 99232 SBSQ HOSP IP/OBS MODERATE 35: CPT | Performed by: INTERNAL MEDICINE

## 2021-02-15 RX ORDER — POTASSIUM CHLORIDE 20 MEQ/1
40 TABLET, EXTENDED RELEASE ORAL ONCE
Status: COMPLETED | OUTPATIENT
Start: 2021-02-15 | End: 2021-02-15

## 2021-02-15 RX ORDER — BUMETANIDE 0.25 MG/ML
2 INJECTION, SOLUTION INTRAMUSCULAR; INTRAVENOUS
Status: COMPLETED | OUTPATIENT
Start: 2021-02-15 | End: 2021-02-16

## 2021-02-15 NOTE — PROGRESS NOTES
[facility]  Cardiology Progress Note    Subjective:  Renal function improving. Denies any chest pain. Still with intermittent LLE pain. Plan to replace wound vac today.     Objective:  /60 (BP Location: Right arm)   Pulse 69   Temp 98.7 °F (37 fem-distal bypass 2/10/21.  Post op surgical care per  Dr. Nancy Flor. · Sign volume overload, hyponatremia- wts/I/O not reliable but with marked BLE up into thighs and scrotal edema  · AMADO/CKD, baseline CR ~ 1.3-1.5, renal following.   Renal fx improving with

## 2021-02-15 NOTE — SLP NOTE
SPEECH DAILY NOTE - INPATIENT    ASSESSMENT & PLAN   ASSESSMENT  Pt seen for dysphagia tx to assess tolerance with recommended diet, ensure proper utilization of aspiration precautions and provide pt/family education.   Patient alert and up in bed, recalled

## 2021-02-15 NOTE — PROGRESS NOTES
[facility]  Progress note     Josephine Hugehs Patient Status:  Inpatient    1950 MRN [id number]   [facility] 5NW-A Attending Bobby Bauer MD   Hosp Day # 15 PCP LUIS ALFREDO SHAFFER     Chief Complaint: foot infection  Subjective:   P CREATSERUM 1.25   < > 2.48* 2.85* 2.34*  --    GFRAA 66   < > 29* 24* 31*  --    GFRNAA 57*   < > 25* 21* 27*  --    CA 7.3*   < > 7.8* 7.6* 7.5*  --    ALB 1.7*  --   --   --   --   --       < > 130* 132* 135*  --    K 3.9   < > 4.6 4.4 3.6 3.6 defect involving septal aspect of the apical wall, no active ischemia  3. Cardiac medication regimen per cardiology including aspirin, statin, Plavix, metoprolol  6. Acute kidney injury on chronic kidney disease, anasarca, scrotal edema  1.  Seen by nephrol

## 2021-02-15 NOTE — CM/SW NOTE
MSW met with the patient and wife at bedside to confirm discharge plans. They are as follows: The patient will go to the wound care clinic 3x per week and [facility] for OP IV abx as long as drug is Q24.   Script still needs to be sent to OP pharmacy

## 2021-02-15 NOTE — PROGRESS NOTES
[facility]  Nephrology Progress Note    Kinza Tripathi Patient Status:  Inpatient    1950 MRN [id number]   [facility] 5NW-A Attending Garima Abdalla MD   Hosp Day # 15 PCP LUIS ALFREDO SHAFFER       SUBJECTIVE:  No specific c/o this AM 02/14/21  0443 02/15/21  0505      < > 139 132* 130* 130* 132* 135*   K 3.9   < > 4.5 4.6 4.6 4.6 4.4 3.6      < > 109 102 99 99 99 101   CO2 26.0   < > 25.0 23.0 24.0 23.0 24.0 25.0   BUN 16   < > 16 27* 29* 30* 40* 38*   CREATSERUM 1.47*   < MAGNESIA) 400 MG/5ML suspension 30 mL, 30 mL, Oral, Daily PRN    •  bisacodyl (DULCOLAX) rectal suppository 10 mg, 10 mg, Rectal, Daily PRN    •  Fleet Enema (FLEET) 7-19 GM/118ML enema 133 mL, 1 enema, Rectal, Once PRN    •  Pantoprazole Sodium (PROTONIX) vascular surgery, wound care, podiatry    #3. Edema- in setting of AMADO and modest CHF. IV bumex today and will follow    #4. Urinary obstruction- appreciate ongoing  eval         Questions/concerns were discussed with patient and/or family by bedside.

## 2021-02-15 NOTE — PLAN OF CARE
Assumed care at 0700. No acute issues. Remains edematous, BLE, scrotum/penis. Diuretics ordered per renal with good response. Urine studies completed. Wound vac removed per podiatry. Packed w--d w/ kerlex roll.  Wound vac to be replaced by wound care in

## 2021-02-15 NOTE — PLAN OF CARE
Received patient A/Ox4, RA, NSR on tele at 0700  Pressure ulcer to left foot, site moist pink and bleeding, wet to dry applied  Incision to left leg, staples in place, kerlix changed  Steinberg in place, no complications   Swelling to scrotum  Bilateral pedal and symptoms of hyperglycemia and hypoglycemia  - Administer ordered medications to maintain glucose within target range  - Assess barriers to adequate nutritional intake and initiate nutrition consult as needed  - Instruct patient on self management of di

## 2021-02-15 NOTE — PROGRESS NOTES
Patient seen at bedside wet-to-dry dressing in place awaiting reapplication of wound VAC.   Once wound feels then referral to plastics would be appropriate

## 2021-02-15 NOTE — PLAN OF CARE
Assumed care of pt.  At 1930  A&O x 4  Room Air- lungs diminihsed/clear  NSR on telemetry  Abdomen soft round distended- refused stool softener this evening  Scrotum and penis edematous- elevated with pillow  Given IV lasix per MAR  Left leg incision- stapl Blood Glucose as ordered  - Assess for signs and symptoms of hyperglycemia and hypoglycemia  - Administer ordered medications to maintain glucose within target range  - Assess barriers to adequate nutritional intake and initiate nutrition consult as needed

## 2021-02-16 LAB
ANION GAP SERPL CALC-SCNC: 6 MMOL/L (ref 0–18)
BUN BLD-MCNC: 33 MG/DL (ref 7–18)
BUN/CREAT SERPL: 17.1 (ref 10–20)
CALCIUM BLD-MCNC: 8.1 MG/DL (ref 8.5–10.1)
CHLORIDE SERPL-SCNC: 100 MMOL/L (ref 98–112)
CO2 SERPL-SCNC: 30 MMOL/L (ref 21–32)
CREAT BLD-MCNC: 1.93 MG/DL
GLUCOSE BLD-MCNC: 217 MG/DL (ref 70–99)
GLUCOSE BLD-MCNC: 225 MG/DL (ref 70–99)
GLUCOSE BLD-MCNC: 226 MG/DL (ref 70–99)
GLUCOSE BLD-MCNC: 249 MG/DL (ref 70–99)
GLUCOSE BLD-MCNC: 267 MG/DL (ref 70–99)
OSMOLALITY SERPL CALC.SUM OF ELEC: 296 MOSM/KG (ref 275–295)
POTASSIUM SERPL-SCNC: 3.6 MMOL/L (ref 3.5–5.1)
POTASSIUM SERPL-SCNC: 3.9 MMOL/L (ref 3.5–5.1)
SODIUM SERPL-SCNC: 136 MMOL/L (ref 136–145)

## 2021-02-16 PROCEDURE — 99232 SBSQ HOSP IP/OBS MODERATE 35: CPT | Performed by: INTERNAL MEDICINE

## 2021-02-16 PROCEDURE — 99233 SBSQ HOSP IP/OBS HIGH 50: CPT | Performed by: INTERNAL MEDICINE

## 2021-02-16 RX ORDER — BUMETANIDE 0.25 MG/ML
2 INJECTION, SOLUTION INTRAMUSCULAR; INTRAVENOUS
Status: DISPENSED | OUTPATIENT
Start: 2021-02-16 | End: 2021-02-17

## 2021-02-16 RX ORDER — POTASSIUM CHLORIDE 20 MEQ/1
40 TABLET, EXTENDED RELEASE ORAL EVERY 4 HOURS
Status: COMPLETED | OUTPATIENT
Start: 2021-02-16 | End: 2021-02-16

## 2021-02-16 NOTE — CM/SW NOTE
Leida [phone] about pt's transport chair and they said they will have to call back tomorrow as they have left for the day due to the weather.

## 2021-02-16 NOTE — PROGRESS NOTES
[facility]  Nephrology Progress Note    Lottie Pool Patient Status:  Inpatient    1950 MRN [id number]   [facility] 5NW-A Attending Sean Coles MD   Hosp Day # 14 PCP LUIS ALFREDO SHAFFER       SUBJECTIVE:  Edema and wt better, cont t 02/12/21  2300 02/13/21  0525 02/14/21  0443 02/15/21  0505 02/15/21  1209 02/16/21  0536      < > 139   < > 130* 130* 132* 135*  --  136   K 3.9   < > 4.5   < > 4.6 4.6 4.4 3.6 3.6 3.6      < > 109   < > 99 99 99 101  --  100   CO2 26.0   < > (MILK OF MAGNESIA) 400 MG/5ML suspension 30 mL, 30 mL, Oral, Daily PRN    •  bisacodyl (DULCOLAX) rectal suppository 10 mg, 10 mg, Rectal, Daily PRN    •  Fleet Enema (FLEET) 7-19 GM/118ML enema 133 mL, 1 enema, Rectal, Once PRN    •  Pantoprazole Sodium ( including vascular surgery, wound care, podiatry    #3. Edema- in setting of AMADO and modest CHF. IV bumex again today and will follow    #4. Urinary obstruction- appreciate ongoing  eval     #5.   Hypokalemia- replace and follow      Questions/concerns

## 2021-02-16 NOTE — PROGRESS NOTES
INFECTIOUS DISEASE PROGRESS NOTE    Sindhu Maldonado Patient Status:  Inpatient    1950 MRN [id number]   [facility] 5NW-A Attending Susu Mcmahon MD   Hosp Day # 15 PCP Chery Muhammad in Sodium Chloride (PF) 0.9 % 10 mL IV push, 40 mg, Intravenous, Q24H **OR** Pantoprazole Sodium (PROTONIX) EC tab 40 mg, 40 mg, Oral, Q24H  •  insulin detemir (LEVEMIR) 100 UNIT/ML flextouch 10 Units, 10 Units, Subcutaneous, Nightly  •  temazepam (RESTORI Movement of all extremities. + upper and lower extremity edema. Integument: Dressing over left foot with wound vac in place.     Laboratory Data:    Recent Labs   Lab 02/12/21  1204 02/12/21  1204 02/13/21  0089 02/13/21  1206 02/14/21  0443 02/15/21  0505 EMR,  Hospital Encounter on 02/02/21   1. ANAEROBIC CULTURE     Status: Abnormal    Collection Time: 02/04/21  5:13 PM    Specimen: Foot,left; Abscess   Result Value Ref Range    Anaerobic Culture 2+ growth Bacteroides thetaiotaomicron (A) N/A   2.  AEROBIC Compression is demonstrated of the common femoral, superficial femoral and popliteal venous segments bilaterally. There is observed phasicity and augmentation. Flow is demonstrated in the posterior tibial veins.      Impression: CONCLUSION: Negative DVT jermaine

## 2021-02-16 NOTE — DIETARY NOTE
[address] [facility]    Josephine Hughes     Admitting diagnosis:  Diabetic foot infection ([facility] [address] [E11.628, L08.9]    Ht: 172.7 cm (5' 8\")  Wt: 87.4 kg (192 lb 9.6 oz). This is 141 % of IBW  Body mass index is 29.28 kg/m².   IBW: 70kg    Labs/M

## 2021-02-16 NOTE — PLAN OF CARE
Assumed care of patient at [address] [place] [address]. Patient is alert and oriented. Oxygen saturation remains above 90% on room air. Nonproductive cough. Encouraged incentive spirometer while awake. Fever of 101.2 overnight. ID updated. Blood cultures drawn.  Tylenol administe range  Description: INTERVENTIONS:  - Monitor Blood Glucose as ordered  - Assess for signs and symptoms of hyperglycemia and hypoglycemia  - Administer ordered medications to maintain glucose within target range  - Assess barriers to adequate nutritional i

## 2021-02-16 NOTE — WOUND PROGRESS NOTE
[facility]  Report of Inpatient Wound Care Consultation    Jin Mckeon Patient Status:  Inpatient    1950 MRN [id number]   [facility] 7NE-A Attending Ferdinand Rubin MD   Hosp Day # 15 [facility] [address] [facility]     Reason for Consultation: pressure. Next dressing change is 2/18. To follow up at the wound clinic if discharge to home. Thank you for allowing me to participate in the care of your patient. Time Spent 30 minutes, Thank you.     Lorice Bamberger RN, BSN [facility]   Wound Care pager

## 2021-02-16 NOTE — PROGRESS NOTES
[facility]  Cardiology Progress Note    Subjective:  POD #6 distal LLE bypass for severe PVD with MSSA lef tfoot osteo. No chest pain or shortness of breath. Sleepy.      Objective:  /68 (BP Location: Right arm)   Pulse 70   Temp 98.2 °F (36.8 ° fixed apical-septal defect. No chest pain symptoms. · HLD: statin  · DM    Plan:  · Diuretics per nephrology, dose of IV bumex today. Monitor IO and daily weight. Legs look okay today. Some mild-moderate pitting edema bilaterally.   · Continue IV antibiot

## 2021-02-16 NOTE — OCCUPATIONAL THERAPY NOTE
OCCUPATIONAL THERAPY TREATMENT NOTE - INPATIENT     Room Number: 2116/1104-H  Session: 1  Number of Visits to Meet Established Goals: 5    Presenting Problem: PVD, L foot infection, 2/4 I&D necrotic tissue, drain abscess and resection osteomyelitis 5th met goal is to go home when ready    OBJECTIVE  Precautions: Bed/chair alarm; Other (Comment)(L foot wound vac)    WEIGHT BEARING RESTRICTION  Weight Bearing Restriction: L lower extremity           L Lower Extremity: Non-Weight Bearing    PAIN ASSESSMENT  Rati WOB.  OT/PT emphasized importance of using WC to bump into home with 2 person assistance due to decreased safety of using RW ot enter home, understanding was voiced.   SW was contacted to help family arrange transportation home with assistance to eeter the training;Equipment eval/education; Compensatory technique education  Rehab Potential : Good  Frequency (Obs): 3-5x/week      OT Goals: progressing 2/16/21  ADL Goals   Patient will perform lower body dressing:  with min assist  Patient will perform toiletin

## 2021-02-16 NOTE — PROGRESS NOTES
Graft patent. Afebrile- still with draining ballard due to scrotal edema. Incisions clean/dry.  Per primary service

## 2021-02-16 NOTE — PROGRESS NOTES
[facility]  Progress note     Jannpatricia Simpson Patient Status:  Inpatient    1950 MRN [id number]   [facility] 5NW-A Attending Chetan Bojorquez MD   Hosp Day # 15 PCP LUIS ALFREDO SHAFFER     Chief Complaint: foot infection  Subjective:   P 225*   BUN 14   < > 40* 38*  --  33*   CREATSERUM 1.25   < > 2.85* 2.34*  --  1.93*   GFRAA 66   < > 24* 31*  --  39*   GFRNAA 57*   < > 21* 27*  --  34*   CA 7.3*   < > 7.6* 7.5*  --  8.1*   ALB 1.7*  --   --   --   --   --       < > 132* 135*  -- diastolic dysfunction on echo  1. Cardiology is following  2. 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  Cardiac medication regimen per cardiology includ

## 2021-02-16 NOTE — PHYSICAL THERAPY NOTE
PHYSICAL THERAPY TREATMENT NOTE - INPATIENT    Room Number: 9443/0611-V     Session: 2  Number of Visits to Meet Established Goals: 5    Presenting Problem: Diabetic L foot wound infection       History related to current admission:  Pt was admitted from extremity           L Lower Extremity: Non-Weight Bearing    PAIN ASSESSMENT   Ratin  Location: B le's       BALANCE                                                                                                                     Static Sitting: Goo abruptly carried the walker in his hand and attempted to step up but experienced lob, max assist provided to regain balance at that time.  Pt rest in the chair, entire technique reviewed, pt is instructed to back up to the step in order to step up, pt able therapistand PCT and worn throughout tx: mask, gloves, and goggles. PPE donned by wife.

## 2021-02-16 NOTE — PLAN OF CARE
Assumed patient care at 0730.  VSS   No pain reported   1+ edema noted to BLE   Wound vac placed to left foot   Kerlix and staples to lef thigh/leg C/D/I   IV bumex continued, last dose tomorrow at 0900  Adequate urine output, ballard continuation to be carito

## 2021-02-16 NOTE — PROGRESS NOTES
Patient currently on wet-to-dry dressings for the left foot awaiting reapplication of the wound VAC. Appears to have good blood flow post procedure. Patient can be discharge once okay with vascular.   Patient will need wound VAC and an outpatient basis an

## 2021-02-17 LAB
ANION GAP SERPL CALC-SCNC: 5 MMOL/L (ref 0–18)
BUN BLD-MCNC: 25 MG/DL (ref 7–18)
BUN/CREAT SERPL: 16.4 (ref 10–20)
CALCIUM BLD-MCNC: 8.4 MG/DL (ref 8.5–10.1)
CHLORIDE SERPL-SCNC: 99 MMOL/L (ref 98–112)
CO2 SERPL-SCNC: 32 MMOL/L (ref 21–32)
CREAT BLD-MCNC: 1.52 MG/DL
GLUCOSE BLD-MCNC: 175 MG/DL (ref 70–99)
GLUCOSE BLD-MCNC: 188 MG/DL (ref 70–99)
GLUCOSE BLD-MCNC: 201 MG/DL (ref 70–99)
GLUCOSE BLD-MCNC: 316 MG/DL (ref 70–99)
GLUCOSE BLD-MCNC: 362 MG/DL (ref 70–99)
OSMOLALITY SERPL CALC.SUM OF ELEC: 291 MOSM/KG (ref 275–295)
POTASSIUM SERPL-SCNC: 3.5 MMOL/L (ref 3.5–5.1)
POTASSIUM SERPL-SCNC: 4.4 MMOL/L (ref 3.5–5.1)
SODIUM SERPL-SCNC: 136 MMOL/L (ref 136–145)

## 2021-02-17 PROCEDURE — 99232 SBSQ HOSP IP/OBS MODERATE 35: CPT | Performed by: INTERNAL MEDICINE

## 2021-02-17 PROCEDURE — 99233 SBSQ HOSP IP/OBS HIGH 50: CPT | Performed by: INTERNAL MEDICINE

## 2021-02-17 RX ORDER — POTASSIUM CHLORIDE 20 MEQ/1
40 TABLET, EXTENDED RELEASE ORAL EVERY 4 HOURS
Status: COMPLETED | OUTPATIENT
Start: 2021-02-17 | End: 2021-02-17

## 2021-02-17 NOTE — PROGRESS NOTES
[facility]  Nephrology Progress Note    Tirso Casillas Patient Status:  Inpatient    1950 MRN [id number]   [facility] 5NW-A Attending Mohit Brar MD   Hosp Day # 15 PCP LUIS ALFREDO SHAFFER       SUBJECTIVE:  Edema and wt better, cont t 02/11/21  0513 02/13/21  0525 02/14/21  0443 02/15/21  0505 02/15/21  1209 02/16/21  0536 02/16/21  1810 02/17/21  0554      < > 130* 132* 135*  --  136  --  136   K 4.5   < > 4.6 4.4 3.6 3.6 3.6 3.9 3.5      < > 99 99 101  --  100  --  99   CO sodium (COLACE) cap 100 mg, 100 mg, Oral, BID    •  PEG 3350 (MIRALAX) powder packet 17 g, 17 g, Oral, Daily PRN    •  magnesium hydroxide (MILK OF MAGNESIA) 400 MG/5ML suspension 30 mL, 30 mL, Oral, Daily PRN    •  bisacodyl (DULCOLAX) rectal suppository baseline     #2. L foot diabetic ulcer/abscess/osteomyelitis- noted to have staph and strep species. On abx per ID.  ongoing multispecialty care including vascular surgery, wound care, podiatry    #3. Edema- in setting of AMADO and modest CHF.   IV bumex a

## 2021-02-17 NOTE — PROGRESS NOTES
Patient appears to be doing well. Wound VAC in place. We will do outpatient home health care for follow-up. We will see patient next week for further evaluation.   Once the wound fills in referral to plastics would be appropriate

## 2021-02-17 NOTE — PROGRESS NOTES
[facility]  Cardiology Progress Note    Subjective:  POD 7 distal LLE bypass for severe PVD with MSSA left foot osteo. No chest pain or shortness of breath.     Objective:  /73 (BP Location: Right arm)   Pulse 67   Temp 97.7 °F (36.5 °C) (Oral for now. Eventual ischemic workup. · Presumed ICMP%, EF 31% by gated nuclear with fixed apical-septal defect. No chest pain symptoms today. · HLD: statin  · DM    Plan:  · Diuretics per nephrology  · Continue IV antibiotics.   · Local wound care per

## 2021-02-17 NOTE — PHYSICAL THERAPY NOTE
PHYSICAL THERAPY TREATMENT NOTE - INPATIENT    Room Number: 3746/0063-U     Session: 3   Number of Visits to Meet Established Goals: 5    Presenting Problem: Diabetic L foot wound infection    History related to current admission:  Pt was admitted from [facility] Bearing Restriction: L lower extremity           L Lower Extremity: Non-Weight Bearing    PAIN ASSESSMENT   Ratin  Location: B le's       BALANCE CGA and needed cues for proper hand placement. Pt with poor insight regarding wound vac line management and safety. Gait trained with use of RW, CGA, and emphasis on safety and pacing. Pt required a seated rest break after each distance listed above.  Pt's

## 2021-02-17 NOTE — PROGRESS NOTES
[facility]  Progress note     Ginna Getachewnba Patient Status:  Inpatient    1950 MRN [id number]   [facility] 5NW-A Attending Rina Coleman MD   Hosp Day # 13 PCP LUIS ALFREDO SHAFFER     Chief Complaint: foot infection  Subjective:   P --        Recent Labs   Lab 02/10/21  1414 02/10/21  1414 02/15/21  0505 02/15/21  0505 02/16/21  0536 02/16/21  1810 02/17/21  0554   *   < > 166*  --  225*  --  175*   BUN 14   < > 38*  --  33*  --  25*   CREATSERUM 1.25   < > 2.34*  --  1.93*  -- need for therapuetic anticoag at this time. 4. HTN  1. PRN hydralazine  2. On metoprolol  5.  CAD, cardiomyopathy with low ejection fraction EF of 40 to 45% on echo and fixed defect on stress test, hypertensive heart disease with grade 3 diastolic dysfun

## 2021-02-17 NOTE — PLAN OF CARE
Assumed care at 1930  Denies pain/discomfort.  (+) pedal pulses per doppler. Steinberg draining clear, yellow urine. Wound vac to L foot draining scant amount SS output. L leg with staples, wrapped in kerlix.   IV abx via L PICC      Problem: Patient/Family glucose within target range  - Assess barriers to adequate nutritional intake and initiate nutrition consult as needed  - Instruct patient on self management of diabetes  Outcome: Progressing

## 2021-02-17 NOTE — PROGRESS NOTES
INFECTIOUS DISEASE PROGRESS NOTE    Vivi Mack Patient Status:  Inpatient    1950 MRN [id number]   [facility] 5NW-A Attending Sharad Camacho MD   Hosp Day # 15 PCP Bertha De Oliveira GM/118ML enema 133 mL, 1 enema, Rectal, Once PRN  •  [DISCONTINUED] Pantoprazole Sodium (PROTONIX) 40 mg in Sodium Chloride (PF) 0.9 % 10 mL IV push, 40 mg, Intravenous, Q24H **OR** Pantoprazole Sodium (PROTONIX) EC tab 40 mg, 40 mg, Oral, Q24H  •  insulin rate and rhythm. Abdomen: Soft, nontender, nondistended. Positive bowel sounds. Musculoskeletal: Movement of all extremities. + upper and lower extremity edema. Integument: Dressing over left foot with wound vac in place. No rash noted.     Laboratory Negative   BLOODURINE Moderate*   PHURINE 6.0   PROUR Negative   UROBILINOGEN <2.0   NITRITE Negative   LEUUR Negative   WBCUR 1-4   RBCUR >10*   BACUR Rare*   EPIUR None Seen        Microbiology    Reviewed in EMR,  Hospital Encounter on 02/02/21   1.  BLO superficial femoral, popliteal, sapheno-femoral junction, and posterior tibial veins. PATIENT STATED HISTORY: (As transcribed by Technologist) Lower extremity edema. History of left pop-tibial bypass graft.      FINDINGS: Compression is demonstrated of

## 2021-02-18 LAB
GLUCOSE BLD-MCNC: 199 MG/DL (ref 70–99)
GLUCOSE BLD-MCNC: 222 MG/DL (ref 70–99)
GLUCOSE BLD-MCNC: 222 MG/DL (ref 70–99)
GLUCOSE BLD-MCNC: 256 MG/DL (ref 70–99)

## 2021-02-18 PROCEDURE — 99233 SBSQ HOSP IP/OBS HIGH 50: CPT | Performed by: INTERNAL MEDICINE

## 2021-02-18 PROCEDURE — 99232 SBSQ HOSP IP/OBS MODERATE 35: CPT | Performed by: INTERNAL MEDICINE

## 2021-02-18 RX ORDER — VANCOMYCIN 2 GRAM/500 ML IN 0.9 % SODIUM CHLORIDE INTRAVENOUS
25 ONCE
Status: COMPLETED | OUTPATIENT
Start: 2021-02-18 | End: 2021-02-18

## 2021-02-18 RX ORDER — VANCOMYCIN HYDROCHLORIDE
15 EVERY 24 HOURS
Status: DISCONTINUED | OUTPATIENT
Start: 2021-02-19 | End: 2021-02-19

## 2021-02-18 NOTE — PROGRESS NOTES
[facility]  Cardiology Progress Note    Subjective:  POD 8 distal LLE bypass for severe PVD with MSSA left foot osteo. No chest pain or shortness of breath. Resting comfortably.      Objective:  /58 (BP Location: Right arm)   Pulse 65   Temp 9 · Continue IV antibiotics. · Local wound care per Dr Roderick Prescott and wound care team  · For now, medical therapy for presumed CAD and LV dysfunction: asa/plavix/BB/statin.     Lazarus Flower, APN  2/18/2021  10:26 AM    Patient seen and examined    Patient has

## 2021-02-18 NOTE — PLAN OF CARE
Assumed care at [facility]. At 2000, temp was 99.6. Patient c/o L leg pain so was given PRN Tylenol. Midnight temp was 100.1. Tylenol given. MD notified. Blood cultures x 2 ordered. (+) BLE pulses per doppler.    Wound vac to L foot draining small amount of SS INTERVENTIONS:  - Monitor Blood Glucose as ordered  - Assess for signs and symptoms of hyperglycemia and hypoglycemia  - Administer ordered medications to maintain glucose within target range  - Assess barriers to adequate nutritional intake and initiate n

## 2021-02-18 NOTE — PROGRESS NOTES
[facility]  Progress note     Oneal Roberts Patient Status:  Inpatient    1950 MRN [id number]   [facility] 5NW-A Attending Anastasia Nash MD   Hosp Day # 12 PCP LUIS ALFREDO SHAFFER     Chief Complaint: foot infection  Subjective:   P CREATSERUM 2.34*  --  1.93*  --  1.52*  --    GFRAA 31*  --  39*  --  52*  --    GFRNAA 27*  --  34*  --  45*  --    CA 7.5*  --  8.1*  --  8.4*  --    *  --  136  --  136  --    K 3.6   < > 3.6 3.9 3.5 4.4     --  100  --  99  --    CO2 25. 0 45% on echo and fixed defect on stress test, hypertensive heart disease with grade 3 diastolic dysfunction on echo  1. Cardiology is following  2.  Stress test with old infarct with moderate size moderate severity fixed defect involving septal aspect of the

## 2021-02-18 NOTE — WOUND PROGRESS NOTE
[facility]  Report of Inpatient Wound Care Consultation    Oneal Roberts Patient Status:  Inpatient    1950 MRN [id number]   [facility] 7NE-A Attending Coleman Cook MD   Hosp Day # 12 [facility] [address] [facility]     Reason for Consultation: to left lateral foot. To follow up in the wound clinic if discharge. Thank you for allowing me to participate in the care of your patient. Time Spent 30 minutes, Thank you.     Luh Taveras RN, BSN [facility]   Wound Care pager [phone]  2/18/2021  10:39 AM

## 2021-02-18 NOTE — PLAN OF CARE
Assumed care at 0700. Pt alert and oriented. Vital signs are stable, temps last noc. No complaints of pain. PICC removed and cultured. Blood cx drawn post PICC removal.   Vanco started.    Wound care changed wound vac and dressings  Pt will most like

## 2021-02-18 NOTE — PROGRESS NOTES
[address] [facility] Dosing Service    Initial Pharmacokinetic Consult for Vancomycin Dosing     Bree Kulkarni is a 65year old patient who is being treated for bacteremia.   Pharmacy has been asked to dose Vancomycin by Jaky RODRIGUES    Allergies:  Patient Gram Positive Cocci N/A       Susceptibility    Staphylococcus aureus -  (no method available)     Cefazolin  Sensitive      Clindamycin <=0.25 Sensitive      Erythromycin <=0.25 Sensitive      Gentamicin <=0.5 Sensitive      Levofloxacin <=0.12 Sensitive

## 2021-02-18 NOTE — PLAN OF CARE
Assumed pt care at 0730  VSS  Pt   Abx continued  L PICC c/d/I  Wound vac in place c/d/I w/SS output  Steinberg in place with good urine output  diuretics continued  IV abx continued  Plan to dc tomorrow once cleared w/ OP PT  POC discussed with pt and spouse

## 2021-02-18 NOTE — PHYSICAL THERAPY NOTE
PHYSICAL THERAPY TREATMENT NOTE - INPATIENT    Room Number: 7100/9218-E     Session: 4  Number of Visits to Meet Established Goals: 5    Presenting Problem: Diabetic L foot wound infection    History related to current admission:  [facility] Restriction: L lower extremity           L Lower Extremity: Non-Weight Bearing    PAIN ASSESSMENT   Ratin  Location: B toshia's       BALANCE Pt demonstrates increased fatigue during second 50ft. Pt requires intermittent MIN assist for balance and VC for pacing/sequencing for second 50ft of ambulation.  Pt requires increased cuing for sequencing turning and impulsive when returning to EOB due to

## 2021-02-18 NOTE — PROGRESS NOTES
[facility]  Nephrology Progress Note    Nara Raghav Patient Status:  Inpatient    1950 MRN [id number]   [facility] 5NW-A Attending Richie Finley MD   Hosp Day # 16 PCP LUIS ALFREDO SHAFFER       SUBJECTIVE:  Edema cont to improve, no a 02/17/21  0554 02/17/21  1623   * 132* 135*  --  136  --  136  --    K 4.6 4.4 3.6 3.6 3.6 3.9 3.5 4.4   CL 99 99 101  --  100  --  99  --    CO2 23.0 24.0 25.0  --  30.0  --  32.0  --    BUN 30* 40* 38*  --  33*  --  25*  --    CREATSERUM 2.48* 2.85 133 mL, 1 enema, Rectal, Once PRN    •  Pantoprazole Sodium (PROTONIX) EC tab 40 mg, 40 mg, Oral, Q24H    •  insulin detemir (LEVEMIR) 100 UNIT/ML flextouch 10 Units, 10 Units, Subcutaneous, Nightly    •  temazepam (RESTORIL) cap 15 mg, 15 mg, Oral, Nightl Urinary obstruction- appreciate ongoing  eval     #5. Hypokalemia- replace and follow      Questions/concerns were discussed with patient and/or family by bedside.       Dheeraj Gallagher MD  2/18/2021  7:46 AM

## 2021-02-18 NOTE — CM/SW NOTE
Pt now has positive blood cultures and is not yet ready for dc. ENMA Burgess from ID says pt may no longer need the IV ABX and may be able to be placed on oral antibiotics when he is ready to go home.   Pt will need to be set up with a wound care appts before

## 2021-02-18 NOTE — PROGRESS NOTES
INFECTIOUS DISEASE PROGRESS NOTE    Zeynep Irizarry Patient Status:  Inpatient    1950 MRN [id number]   [facility] 5NW-A Attending Octaviano Maloney MD   Hosp Day # 12 PCP Luis Brower Chloride (PF) 0.9 % 10 mL IV push, 40 mg, Intravenous, Q24H **OR** Pantoprazole Sodium (PROTONIX) EC tab 40 mg, 40 mg, Oral, Q24H  •  insulin detemir (LEVEMIR) 100 UNIT/ML flextouch 10 Units, 10 Units, Subcutaneous, Nightly  •  temazepam (RESTORIL) cap 15 all extremities. + upper and lower extremity edema. Integument: Open wound to L lateral foot. No exposed bone. Appears to be healing with healthy pink/red tissue. No purulent drainage. No surrounding erythema. See picture below.  LLE surgical sites with st Microbiology    Reviewed in EMR,  Hospital Encounter on 02/02/21   1.  BLOOD CULTURE     Status: Abnormal (Preliminary result)    Collection Time: 02/18/21  3:00 AM    Specimen: Bld,Picc Line; Blood   Result Value Ref Range    Blood Culture Result Pen transcribed by Technologist) Lower extremity edema. History of left pop-tibial bypass graft. FINDINGS: Compression is demonstrated of the common femoral, superficial femoral and popliteal venous segments bilaterally.  There is observed phasicity and aug Will follow    Zoila Flores. Cyndi Drake PA-C    ID ATTENDING ADDENDUM     Pt seen an examined independently. Chart reviewed. Agree with above. Note has been reviewed by me and modified as needed. Exam and Impression/ Recs as noted above.   D/w staff and with

## 2021-02-19 LAB
ANION GAP SERPL CALC-SCNC: 7 MMOL/L (ref 0–18)
BUN BLD-MCNC: 26 MG/DL (ref 7–18)
BUN/CREAT SERPL: 16 (ref 10–20)
CALCIUM BLD-MCNC: 8.3 MG/DL (ref 8.5–10.1)
CHLORIDE SERPL-SCNC: 102 MMOL/L (ref 98–112)
CO2 SERPL-SCNC: 29 MMOL/L (ref 21–32)
CREAT BLD-MCNC: 1.63 MG/DL
CREAT BLD-MCNC: 1.63 MG/DL
GLUCOSE BLD-MCNC: 150 MG/DL (ref 70–99)
GLUCOSE BLD-MCNC: 156 MG/DL (ref 70–99)
GLUCOSE BLD-MCNC: 198 MG/DL (ref 70–99)
GLUCOSE BLD-MCNC: 270 MG/DL (ref 70–99)
GLUCOSE BLD-MCNC: 290 MG/DL (ref 70–99)
OSMOLALITY SERPL CALC.SUM OF ELEC: 294 MOSM/KG (ref 275–295)
POTASSIUM SERPL-SCNC: 3.9 MMOL/L (ref 3.5–5.1)
SODIUM SERPL-SCNC: 138 MMOL/L (ref 136–145)

## 2021-02-19 PROCEDURE — 99231 SBSQ HOSP IP/OBS SF/LOW 25: CPT | Performed by: UROLOGY

## 2021-02-19 PROCEDURE — 99232 SBSQ HOSP IP/OBS MODERATE 35: CPT | Performed by: INTERNAL MEDICINE

## 2021-02-19 NOTE — OCCUPATIONAL THERAPY NOTE
Met with pt and spouse in room this day. Pt had completed mobility earlier this day with PT. Pt and pt's spouse reported no needs this day and feel safe to go home and complete ADLs and mobility safely.  Pt reported just being made comfortable in bed and po

## 2021-02-19 NOTE — PROGRESS NOTES
INFECTIOUS DISEASE PROGRESS NOTE    Suad Beltrán Patient Status:  Inpatient    1950 MRN [id number]   [facility] 5NW-A Attending Rodríguez Jones MD   Hosp Day # 16 PCP Kofi Fishman PRN  •  Fleet Enema (FLEET) 7-19 GM/118ML enema 133 mL, 1 enema, Rectal, Once PRN  •  [DISCONTINUED] Pantoprazole Sodium (PROTONIX) 40 mg in Sodium Chloride (PF) 0.9 % 10 mL IV push, 40 mg, Intravenous, Q24H **OR** Pantoprazole Sodium (PROTONIX) EC tab 40 rhonchi. Cardiovascular: S1, S2.  Regular rate and rhythm. Abdomen: Soft, nontender, nondistended. Positive bowel sounds. Musculoskeletal: Movement of all extremities. Extremity edema improving.   Integument: L foot with dressing and wound vac in place Specimen: Foot,left; Abscess   Result Value Ref Range    Anaerobic Culture 2+ growth Bacteroides thetaiotaomicron (A) N/A   3. AEROBIC BACTERIAL CULTURE     Status: Abnormal    Collection Time: 02/04/21  5:13 PM    Specimen: Foot,left;  Abscess   Result Julia 2/12/2021 at 8:15 PM   Finalized by (CST): Halie Grajeda MD on 2/12/2021 at 8:16 PM     ASSESSMENT:  1. Fevers. ? Etiology. Left foot wound clean and healing without exposed bone. LLE surgical sites also clean. No diarrhea or abd sxs. No obvious source.  Reso MD

## 2021-02-19 NOTE — PROGRESS NOTES
[facility]  Progress note     Rose Brown Patient Status:  Inpatient    1950 MRN [id number]   [facility] 5NW-A Attending Deondre Higgins MD   Hosp Day # 16 PCP LUIS ALFREDO SHAFFER     Chief Complaint: foot infection  Subjective:   P 48*  48*   GFRNAA 34*  --  45*  --  41*  41*   CA 8.1*  --  8.4*  --  8.3*     --  136  --  138   K 3.6   < > 3.5 4.4 3.9     --  99  --  102   CO2 30.0  --  32.0  --  29.0    < > = values in this interval not displayed.        Estimated Creati cardiomyopathy with low ejection fraction EF of 40 to 45% on echo and fixed defect on stress test, hypertensive heart disease with grade 3 diastolic dysfunction on echo  1. Cardiology is following  2.  Stress test with old infarct with moderate size moderat testing is negative, may discontinue isolation: yes     Plan of care discussed with pt, and RN.   Had discussed with patient's wife yesterday, will also update family today    Andrew Geller MD  [facility]  Internal Medicine Hospitalist  2/19/2021

## 2021-02-19 NOTE — PHYSICAL THERAPY NOTE
PHYSICAL THERAPY TREATMENT NOTE - INPATIENT    Room Number: 1594/4610-J     Session: 5  Number of Visits to Meet Established Goals: 5    Presenting Problem: Diabetic L foot wound infection    History related to current admission:  [facility] Restriction: L lower extremity           L Lower Extremity: Non-Weight Bearing    PAIN ASSESSMENT   Ratin  Location: B toshia's       BALANCE comfortable with B LE leges elevated. Wife at bs. Nursing is aware of this visit. Pt's wife reported they obtained a ramp for their entrance, pt is no longer going to need to ascend/descend a step to enter/leave his house.      Patient End of Session:

## 2021-02-20 ENCOUNTER — HOSPITAL ENCOUNTER (EMERGENCY)
Facility: HOSPITAL | Age: 71
Discharge: HOME | End: 2021-02-21
Attending: EMERGENCY MEDICINE
Payer: MEDICARE

## 2021-02-20 VITALS
RESPIRATION RATE: 18 BRPM | HEART RATE: 67 BPM | TEMPERATURE: 98 F | OXYGEN SATURATION: 94 % | WEIGHT: 185.19 LBS | DIASTOLIC BLOOD PRESSURE: 50 MMHG | SYSTOLIC BLOOD PRESSURE: 108 MMHG | BODY MASS INDEX: 28.07 KG/M2 | HEIGHT: 68 IN

## 2021-02-20 DIAGNOSIS — T81.30XA WOUND DEHISCENCE: Primary | ICD-10-CM

## 2021-02-20 LAB
ANION GAP SERPL CALC-SCNC: 5 MMOL/L (ref 0–18)
BUN BLD-MCNC: 28 MG/DL (ref 7–18)
BUN/CREAT SERPL: 19.3 (ref 10–20)
CALCIUM BLD-MCNC: 8.2 MG/DL (ref 8.5–10.1)
CHLORIDE SERPL-SCNC: 102 MMOL/L (ref 98–112)
CO2 SERPL-SCNC: 29 MMOL/L (ref 21–32)
CREAT BLD-MCNC: 1.45 MG/DL
CREAT BLD-MCNC: 1.45 MG/DL
GLUCOSE BLD-MCNC: 105 MG/DL (ref 70–99)
GLUCOSE BLD-MCNC: 110 MG/DL (ref 70–99)
GLUCOSE BLD-MCNC: 163 MG/DL (ref 70–99)
OSMOLALITY SERPL CALC.SUM OF ELEC: 288 MOSM/KG (ref 275–295)
POTASSIUM SERPL-SCNC: 4.1 MMOL/L (ref 3.5–5.1)
SODIUM SERPL-SCNC: 136 MMOL/L (ref 136–145)

## 2021-02-20 PROCEDURE — 12020 TX SUPFC WND DEHSN SMPL CLSR: CPT

## 2021-02-20 PROCEDURE — 99283 EMERGENCY DEPT VISIT LOW MDM: CPT

## 2021-02-20 PROCEDURE — 99232 SBSQ HOSP IP/OBS MODERATE 35: CPT | Performed by: INTERNAL MEDICINE

## 2021-02-20 RX ORDER — TAMSULOSIN HYDROCHLORIDE 0.4 MG/1
0.4 CAPSULE ORAL DAILY
Qty: 30 CAPSULE | Refills: 0 | Status: SHIPPED | OUTPATIENT
Start: 2021-02-21 | End: 2021-03-23

## 2021-02-20 RX ORDER — ATORVASTATIN CALCIUM 40 MG/1
40 TABLET, FILM COATED ORAL NIGHTLY
Qty: 30 TABLET | Refills: 3 | Status: SHIPPED | OUTPATIENT
Start: 2021-02-20

## 2021-02-20 RX ORDER — METOPROLOL SUCCINATE 25 MG/1
25 TABLET, EXTENDED RELEASE ORAL
Qty: 60 TABLET | Refills: 3 | Status: ON HOLD | OUTPATIENT
Start: 2021-02-21 | End: 2021-12-14

## 2021-02-20 RX ORDER — ASPIRIN 81 MG/1
81 TABLET ORAL DAILY
Qty: 30 TABLET | Refills: 0 | Status: SHIPPED | COMMUNITY
Start: 2021-02-21

## 2021-02-20 RX ORDER — AMOXICILLIN AND CLAVULANATE POTASSIUM 875; 125 MG/1; MG/1
1 TABLET, FILM COATED ORAL 2 TIMES DAILY
Qty: 20 TABLET | Refills: 0 | Status: SHIPPED | OUTPATIENT
Start: 2021-02-20 | End: 2021-03-02

## 2021-02-20 RX ORDER — CLOPIDOGREL BISULFATE 75 MG/1
75 TABLET ORAL DAILY
Qty: 30 TABLET | Refills: 3 | Status: SHIPPED | OUTPATIENT
Start: 2021-02-21 | End: 2021-12-14

## 2021-02-20 NOTE — DISCHARGE SUMMARY
[facility]  Discharge Summary    Bree Kulkarni Patient Status:  Inpatient    1950 MRN [id number]   [facility] 7NE-A Attending No att. providers found   Hosp Day # 18 [facility]     Date of Admission: 2021    Date of Disch the hospital.  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   There is gas surrounding the head of th Patient had generalized edema in the setting of acute kidney injury and CHF exacerbation. Also had hypotension, given albumin infusion and blood pressure improved. Treated with IV diuretics by cardiology and nephrology. Patient improved clinically.   Gen by podiatry Dr. Clarsisa Castañeda on 2/4/2021  2.  Left above-knee popliteal artery to distal posterior tibial artery in situ left great saphenous vein bypass, with endarterectomy of popliteal artery and posterior tibial artery done by vascular surgery Dr. Fahad Esqueda on 2 to [facility]/pharmacy #[id number]- [place] Keri B[address]csi [facility] [address]..  [phone], [phone]  [address] [address]    Phone: [phone]   · atorvastatin 40 MG Tabs  · Clopidogrel Bisulfate 75 MG Tabs  · Metoprolol Succinate ER 25 MG Tb24 None            Physical Exam:  /50 (BP Location: Right arm)   Pulse 67   Temp 98.3 °F (36.8 °C) (Oral)   Resp 18   Ht 5' 8\" (1.727 m)   Wt 185 lb 3.2 oz (84 kg)   SpO2 94%   BMI 28.16 kg/m²       General appearance: alert and cooperative  Head: Nor

## 2021-02-20 NOTE — PROGRESS NOTES
[facility]  Progress note     Zacarias Camara Patient Status:  Inpatient    1950 MRN [id number]   [facility] 5NW-A Attending Joe Norris MD   Hosp Day # 25 PCP LUIS ALFREDO SHAFFER     Chief Complaint: foot infection  Subjective:   P hours. No results for input(s): TROP, CK in the last 168 hours. Imaging: Imaging data reviewed in Epic. ASSESSMENT / PLAN:     1.  Left foot cellulitis and osteomyelitis, diabetic foot ulcer and peripheral vascular disease with total occlusion of infarct with moderate size moderate severity fixed defect involving septal aspect of the apical wall, no active ischemia  3. Cardiac medication regimen per cardiology including aspirin, statin, Plavix, metoprolol  6.  Acute kidney injury on chronic kidney d morning tomorrow if here overnight    aTna Ruiz MD  [facility]  Internal Medicine Hospitalist  2/20/2021

## 2021-02-20 NOTE — PLAN OF CARE
Assumed care at 0700  Patient alert, oriented x4  L foot wound vac intact  Denies pain  Worked with PT. Ambulated in [place]  Urology re-consulted for ballard removal  Ballard out.  Voiding without difficulty  Per microbiology, blood cultures (-)    Plan for dc t

## 2021-02-20 NOTE — PLAN OF CARE
Per Dr. Elicia Andrade, ok to remove wound vac and apply wet to dry dressing until patient can go to wound clinic on Monday  Patient instructed to call wound clinic early Monday morning to schedule appointment.   Home wound vac sent home with patient  Betadine and

## 2021-02-20 NOTE — PROGRESS NOTES
INFECTIOUS DISEASE PROGRESS NOTE    Danielle Saldivar Patient Status:  Inpatient    1950 MRN [id number]   [facility] 5NW-A Attending Nara Lind MD   Hosp Day # 25 PCP Tejaffery Stage Intravenous, Q24H **OR** Pantoprazole Sodium (PROTONIX) EC tab 40 mg, 40 mg, Oral, Q24H  •  insulin detemir (LEVEMIR) 100 UNIT/ML flextouch 10 Units, 10 Units, Subcutaneous, Nightly  •  temazepam (RESTORIL) cap 15 mg, 15 mg, Oral, Nightly  •  hydrALAzine H Extremity edema improving. Integument: L foot with dressing and wound vac in place.     Laboratory Data:    Recent Labs   Lab 02/13/21  1206 02/14/21  0443 02/15/21  0505   RBC  --  2.88* 2.87*   HGB 8.2* 7.8* 7.9*   HCT  --  24.7* 24.3*   MCV  --  85.8 84 normal skin flo    Aerobic Culture Result 2+ growth Staphylococcus aureus (A) N/A    Aerobic Culture Result 2+ growth Streptococcus constellatus (A) N/A    Aerobic Smear 2+ WBCs seen N/A    Aerobic Smear 3+ Gram Positive Cocci N/A       Susceptibility However, d/w micro, no growth to date- appears to it was erroneous. 3. L diabetic foot infection/ abscesses with OM of the 5th metatarsal.   a. Cx 2/2 with S aureus (MSSA) and Strep constellatus.    b. Elevated CRP and ESR.  c. S/p I&D with drainage of abs

## 2021-02-20 NOTE — CM/SW NOTE
Pt discharging today and is requesting a wound care clinic follow up appointment be made for Monday. I informed the floor RN the appointment can't be made until Monday.  Wound Care clinic contact information placed on AVS. Pt informed to call clinic Monday

## 2021-02-20 NOTE — PROGRESS NOTES
[facility]  Nephrology Progress Note    Josephine Hughes Patient Status:  Inpatient    1950 MRN [id number]   [facility] 5NW-A Attending Maureen Ponce MD   Hosp Day # 25 PCP LUIS ALFREDO SHAFFER       SUBJECTIVE:  Steinberg removed yesterday, do 02/16/21  0536 02/16/21  1810 02/17/21  0554 02/17/21  1623 02/19/21  0523 02/20/21  0708   *  --  136  --  136  --  138 136   K 3.6   < > 3.6 3.9 3.5 4.4 3.9 4.1     --  100  --  99  --  102 102   CO2 25.0  --  30.0  --  32.0  --  29.0 29.0 PRN    •  bisacodyl (DULCOLAX) rectal suppository 10 mg, 10 mg, Rectal, Daily PRN    •  Fleet Enema (FLEET) 7-19 GM/118ML enema 133 mL, 1 enema, Rectal, Once PRN    •  Pantoprazole Sodium (PROTONIX) EC tab 40 mg, 40 mg, Oral, Q24H    •  insulin detemir (LE modest CHF. Markedly better and should resolve     #4. Urinary obstruction- ballard removed with good UOP         Questions/concerns were discussed with patient and/or family by bedside.       Quang Blanco for discharge from nephrology standpoint      Raffy Pop

## 2021-02-20 NOTE — PLAN OF CARE
Assumed care @ 1930. Pt a/o x4, VSS. Tele SR. No acute respiratory distress noted. C/O Rt foot pain, 2-3/10, from activity. PRN Norco given with some relief. Pt resting in bed. Stood up for daily weight. Wound vac dressing intact.   No outpu Encourage toileting schedule  2/20/2021 0353 by Didier Bourne, RN  Outcome: Progressing  2/20/2021 0353 by Didier Bourne, RN  Outcome: Progressing     Problem: Diabetes/Glucose Control  Goal: Glucose maintained within prescribed range  Description: INTERVENT

## 2021-02-21 VITALS
RESPIRATION RATE: 16 BRPM | DIASTOLIC BLOOD PRESSURE: 84 MMHG | OXYGEN SATURATION: 99 % | WEIGHT: 170 LBS | HEART RATE: 86 BPM | BODY MASS INDEX: 25.76 KG/M2 | HEIGHT: 68 IN | SYSTOLIC BLOOD PRESSURE: 146 MMHG | TEMPERATURE: 98 F

## 2021-02-21 NOTE — ED INITIAL ASSESSMENT (HPI)
Bleeding in left surgical leg which started 10:30pm. Stitches where removed earlier today before patient was released from hospital.

## 2021-02-21 NOTE — ED PROVIDER NOTES
Patient Seen in: [facility] Emergency Department      History   Patient presents with:  Bleeding    Stated Complaint: bleeding in left leg post surgical procedure    HPI/Subjective:   HPI    68-year-old male comes to the hospital with a complaint of bleeding at this time. No erythema, pus or drainage. Is otherwise neurovascular tact. Remaining wound is closed and healing well    ED Course   Labs Reviewed - No data to display       Laceration was anesthetized with lidocaine locally.   The wound was c

## 2021-02-22 NOTE — CDS QUERY
Heart Failure  CLINICAL DOCUMENTATION CLARIFICATION FORM    Clinical information (provided below) includes a diagnosis of Heart Failure.  For accurate ICD-10-CM code assignment to reflect severity of illness and risk of mortality,    PLEASE CHECK ALL DIAGNO Pericardium, extracardiac: There were bilateral pleural effusions     present.           2/9 Lasix 20mg   IV x1  2/12 Lasix 20mg IV x1  2/13 Lasix 20mh IV x1   2/14 Lasix 60mg IV x2       2/15 Bumex IV 2mg     2/15 Renal:  Edema- in setting of AMADO and modes

## 2021-02-23 ENCOUNTER — OFFICE VISIT (OUTPATIENT)
Dept: WOUND CARE | Facility: HOSPITAL | Age: 71
End: 2021-02-23
Attending: NURSE PRACTITIONER
Payer: MEDICARE

## 2021-02-23 DIAGNOSIS — L97.524 NON-PRESSURE CHRONIC ULCER OF OTHER PART OF LEFT FOOT WITH NECROSIS OF BONE (HCC): Primary | ICD-10-CM

## 2021-02-23 DIAGNOSIS — E11.621 TYPE 2 DIABETES MELLITUS WITH FOOT ULCER (HCC): ICD-10-CM

## 2021-02-23 DIAGNOSIS — T81.31XD DISRUPTION OF EXTERNAL OPERATION (SURGICAL) WOUND, NOT ELSEWHERE CLASSIFIED, SUBSEQUENT ENCOUNTER: ICD-10-CM

## 2021-02-23 DIAGNOSIS — L97.509 TYPE 2 DIABETES MELLITUS WITH FOOT ULCER (HCC): ICD-10-CM

## 2021-02-23 DIAGNOSIS — E11.59 TYPE 2 DIABETES MELLITUS WITH OTHER CIRCULATORY COMPLICATIONS (HCC): ICD-10-CM

## 2021-02-23 LAB — GLUCOSE BLD-MCNC: 252 MG/DL (ref 70–99)

## 2021-02-23 PROCEDURE — 99214 OFFICE O/P EST MOD 30 MIN: CPT

## 2021-02-23 PROCEDURE — 97605 NEG PRS WND THER DME<=50SQCM: CPT

## 2021-02-23 PROCEDURE — 97597 DBRDMT OPN WND 1ST 20 CM/<: CPT

## 2021-02-23 PROCEDURE — 82962 GLUCOSE BLOOD TEST: CPT

## 2021-02-23 PROCEDURE — 29581 APPL MULTLAYER CMPRN SYS LEG: CPT

## 2021-02-23 NOTE — PROGRESS NOTES
Subjective    Chief Complaint  This information was obtained from the patient  Pt is here for left lower leg wounds, pt is taking amox. Denies any pain to any wounds at this time. Pt had surgery here 2 weeks ago, was D/c on Saturday.  Does not have home hea 2-23-21 INITIAL:  65year old male with past medical history significant for diabetes, diabetic neuropathy presented on feb 2 to ER with complaints of redness, swelling, and discharge from his left foot.   Patient has a history of left fifth toe amputation Cancer - No History, Diabetes - No History, Heart Disease - No History, Hypertension - No History, Kidney Disease - No History, Lung Disease - No History, Mental Illness - No History, Stroke - No History, Thyroid Problems - No History, Tuberculosis - No Hi Musculoskeletal: Contractures, Assistive Devices  Integumentary (Hair/Skin/Nails): Skin Allergies, Itching  Psychiatric: Claustrophobia, Nervousness / Tension, Memory Loss, Depression    Additional Information  Medication reconciliation completed at today' Wound #2 Left, Plantar Great Toe is a chronic White Grade 0 Diabetic Ulcer and has received a status of Not Healed. Initial wound encounter measurements are 1.5cm length x 3.3cm width with no measurable depth, with an area of 4.95 sq cm .  No tunneling has Wound #4 Left, Medial, Superior Lower Leg is an acute Full Thickness Surgical Wound and has received a status of Not Healed. Initial wound encounter measurements are 15cm length x 0.1cm width with no measurable depth, with an area of 1.5 sq cm .  No tunneli bp wnl for patient. Pulse Regular and wnl for patient. [place] Respirations easy and unlabored. Temperature wnl. Weight normal for height. . Appearance neat and clean. Appears in no acute distress. Well nourished and well developed.     Respiratory:  Respiratory ef Erythema: No  Dependent Rubor: No  Hyperpigmentation: Yes  Lipodermatosclerosis: No  Right Extremity colors, hair growth, and conditions:  Extremity Color: Pigmented  Hair Growth on Extremity: Yes  Temperature of Extremity: Cool  Capillary Refill: > 3 seco Return Appointment in 1 week. - ON [facility]  RN visit for assessment of wound(s). - mONDAY AND FRIDAY FOR WOUND VAC CHANGE    Wound #3 Left, Medial Lower Leg    Wound Cleansing & Dressings:  May shower with protection.   Betadine  Kerramax/Super absorbent 2-4-21 , crp 9.95    Follow-Up Appointments:  A follow-up appointment should be scheduled.         Discussed with patient the aspects of wound healing including:  blood flow in/out (arterial vs venous) and managing edema with appropriate compression,

## 2021-02-24 ENCOUNTER — APPOINTMENT (OUTPATIENT)
Dept: WOUND CARE | Facility: HOSPITAL | Age: 71
End: 2021-02-24
Attending: NURSE PRACTITIONER
Payer: MEDICARE

## 2021-02-26 ENCOUNTER — OFFICE VISIT (OUTPATIENT)
Dept: WOUND CARE | Facility: HOSPITAL | Age: 71
End: 2021-02-26
Attending: NURSE PRACTITIONER
Payer: MEDICARE

## 2021-02-26 DIAGNOSIS — Z79.4 LONG TERM (CURRENT) USE OF INSULIN (HCC): ICD-10-CM

## 2021-02-26 DIAGNOSIS — L97.524 NON-PRESSURE CHRONIC ULCER OF OTHER PART OF LEFT FOOT WITH NECROSIS OF BONE (HCC): Primary | ICD-10-CM

## 2021-02-26 DIAGNOSIS — E11.621 TYPE 2 DIABETES MELLITUS WITH FOOT ULCER (HCC): ICD-10-CM

## 2021-02-26 DIAGNOSIS — T81.31XD DISRUPTION OF EXTERNAL OPERATION (SURGICAL) WOUND, NOT ELSEWHERE CLASSIFIED, SUBSEQUENT ENCOUNTER: ICD-10-CM

## 2021-02-26 DIAGNOSIS — L97.509 TYPE 2 DIABETES MELLITUS WITH FOOT ULCER (HCC): ICD-10-CM

## 2021-02-26 DIAGNOSIS — E11.59 TYPE 2 DIABETES MELLITUS WITH OTHER CIRCULATORY COMPLICATIONS (HCC): ICD-10-CM

## 2021-02-26 LAB — GLUCOSE BLD-MCNC: 174 MG/DL (ref 70–99)

## 2021-02-26 PROCEDURE — 82962 GLUCOSE BLOOD TEST: CPT

## 2021-02-26 PROCEDURE — 97605 NEG PRS WND THER DME<=50SQCM: CPT

## 2021-02-26 NOTE — CDS QUERY
Acute Impaired Renal Function  CLINICAL DOCUMENTATION CLARIFICATION FORM    Documentation (provided below) includes acutely impaired renal function.  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   + microalbuminuria c/w diabetic nephropathy.     - monitor labs/ UOP  2/10 AT 0644 1.47  2/10 AT 1414 1.25  2/11 AT 0513 1.28   2/11 RENAL:  CKD III- pt reports creat was to longstanding DM; no other risk factors; c/w bland UA. No further w/u     2/15 AT 0505 2.34  #1. 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   developed AMADO post-procedure most likely mu

## 2021-03-01 ENCOUNTER — OFFICE VISIT (OUTPATIENT)
Dept: WOUND CARE | Facility: HOSPITAL | Age: 71
End: 2021-03-01
Attending: NURSE PRACTITIONER
Payer: MEDICARE

## 2021-03-01 DIAGNOSIS — T81.31XD DISRUPTION OF EXTERNAL OPERATION (SURGICAL) WOUND, NOT ELSEWHERE CLASSIFIED, SUBSEQUENT ENCOUNTER: ICD-10-CM

## 2021-03-01 DIAGNOSIS — L97.509 TYPE 2 DIABETES MELLITUS WITH FOOT ULCER (HCC): ICD-10-CM

## 2021-03-01 DIAGNOSIS — E11.621 TYPE 2 DIABETES MELLITUS WITH FOOT ULCER (HCC): ICD-10-CM

## 2021-03-01 DIAGNOSIS — L97.524 NON-PRESSURE CHRONIC ULCER OF OTHER PART OF LEFT FOOT WITH NECROSIS OF BONE (HCC): Primary | ICD-10-CM

## 2021-03-01 DIAGNOSIS — E11.59 TYPE 2 DIABETES MELLITUS WITH OTHER CIRCULATORY COMPLICATIONS (HCC): ICD-10-CM

## 2021-03-01 DIAGNOSIS — Z79.4 LONG TERM (CURRENT) USE OF INSULIN (HCC): ICD-10-CM

## 2021-03-01 LAB — GLUCOSE BLD-MCNC: 127 MG/DL (ref 70–99)

## 2021-03-01 PROCEDURE — 97605 NEG PRS WND THER DME<=50SQCM: CPT

## 2021-03-01 PROCEDURE — 82962 GLUCOSE BLOOD TEST: CPT

## 2021-03-03 ENCOUNTER — OFFICE VISIT (OUTPATIENT)
Dept: WOUND CARE | Facility: HOSPITAL | Age: 71
End: 2021-03-03
Attending: NURSE PRACTITIONER
Payer: MEDICARE

## 2021-03-03 DIAGNOSIS — E11.621 TYPE 2 DIABETES MELLITUS WITH FOOT ULCER (HCC): ICD-10-CM

## 2021-03-03 DIAGNOSIS — L97.509 TYPE 2 DIABETES MELLITUS WITH FOOT ULCER (HCC): ICD-10-CM

## 2021-03-03 DIAGNOSIS — T81.31XD DISRUPTION OF EXTERNAL OPERATION (SURGICAL) WOUND, NOT ELSEWHERE CLASSIFIED, SUBSEQUENT ENCOUNTER: ICD-10-CM

## 2021-03-03 DIAGNOSIS — L97.524 NON-PRESSURE CHRONIC ULCER OF OTHER PART OF LEFT FOOT WITH NECROSIS OF BONE (HCC): Primary | ICD-10-CM

## 2021-03-03 LAB — GLUCOSE BLD-MCNC: 137 MG/DL (ref 70–99)

## 2021-03-03 PROCEDURE — 82962 GLUCOSE BLOOD TEST: CPT

## 2021-03-03 PROCEDURE — 29581 APPL MULTLAYER CMPRN SYS LEG: CPT

## 2021-03-03 NOTE — PROGRESS NOTES
Subjective    Chief Complaint  This information was obtained from the patient  Patient is here for a wound care follow up. He denies any pain or new wound concerns.     Allergies  No known allergies    HPI  This information was obtained from the patient, georgi 2-23-21 INITIAL:  65year old male with past medical history significant for diabetes, diabetic neuropathy presented on feb 2 to ER with complaints of redness, swelling, and discharge from his left foot.   Patient has a history of left fifth toe amputation 3-3-21 patient returns today, there are not any updated notes/labs in [facility]. patient tolerating compression and wound vac without difficulty. the foot wound is significantly improved-no longer needing the wound vac.   I removed 90% of the staples of the medi Wound #1 Left, Lateral Foot is a chronic White Grade 3 Diabetic Ulcer and has received a status of Not Healed.  Subsequent wound encounter measurements are 10.2cm length x 2.3cm width x 0.2cm depth, with an area of 23.46 sq cm and a volume of 4.692 cubic c Wound #3 Left, Medial Lower Leg is an acute Full Thickness Surgical Wound and has received a status of Not Healed. Subsequent wound encounter measurements are 6.8cm length x 0.1cm width with no measurable depth, with an area of 0.68 sq cm .  No tunneling ha The periwound skin exhibited: Edema, Dry/Scaly, Hemosiderosis. The temperature of the periwound skin is Cool. Periwound skin does not exhibit signs or symptoms of infection. Local Pulse is Weak.     Vitals  Height/Length: 69 in (175.26 cm), Weight: 170 lbs (Encounter Diagnosis) E11.621 - Type 2 diabetes mellitus with foot ulcer  (Encounter Diagnosis) E11.59 - Type 2 diabetes mellitus with other circulatory complications  (Encounter Diagnosis) T81.31XD - Disruption of external operation (surgical) wound, not Elevate leg(s)as much as possible. Take you diuretics as directed.     Misc/Additional Orders:  Supplement with a daily multivitamin  Increase protein into diet  Start or continue taking Javi  Decrease salt intake  Exercise as tolerated  S/S of Infection

## 2021-03-05 ENCOUNTER — OFFICE VISIT (OUTPATIENT)
Dept: WOUND CARE | Facility: HOSPITAL | Age: 71
End: 2021-03-05
Attending: NURSE PRACTITIONER
Payer: MEDICARE

## 2021-03-05 DIAGNOSIS — E11.621 TYPE 2 DIABETES MELLITUS WITH FOOT ULCER (HCC): ICD-10-CM

## 2021-03-05 DIAGNOSIS — Z79.4 LONG TERM (CURRENT) USE OF INSULIN (HCC): ICD-10-CM

## 2021-03-05 DIAGNOSIS — E11.59 TYPE 2 DIABETES MELLITUS WITH OTHER CIRCULATORY COMPLICATIONS (HCC): ICD-10-CM

## 2021-03-05 DIAGNOSIS — T81.31XD DISRUPTION OF EXTERNAL OPERATION (SURGICAL) WOUND, NOT ELSEWHERE CLASSIFIED, SUBSEQUENT ENCOUNTER: ICD-10-CM

## 2021-03-05 DIAGNOSIS — L97.524 NON-PRESSURE CHRONIC ULCER OF OTHER PART OF LEFT FOOT WITH NECROSIS OF BONE (HCC): Primary | ICD-10-CM

## 2021-03-05 DIAGNOSIS — L97.509 TYPE 2 DIABETES MELLITUS WITH FOOT ULCER (HCC): ICD-10-CM

## 2021-03-05 PROCEDURE — 29581 APPL MULTLAYER CMPRN SYS LEG: CPT

## 2021-03-05 PROCEDURE — 82962 GLUCOSE BLOOD TEST: CPT

## 2021-03-09 ENCOUNTER — OFFICE VISIT (OUTPATIENT)
Dept: WOUND CARE | Facility: HOSPITAL | Age: 71
End: 2021-03-09
Attending: NURSE PRACTITIONER
Payer: MEDICARE

## 2021-03-09 DIAGNOSIS — L97.524 NON-PRESSURE CHRONIC ULCER OF OTHER PART OF LEFT FOOT WITH NECROSIS OF BONE (HCC): Primary | ICD-10-CM

## 2021-03-09 DIAGNOSIS — L97.509 TYPE 2 DIABETES MELLITUS WITH FOOT ULCER (HCC): ICD-10-CM

## 2021-03-09 DIAGNOSIS — Z79.4 LONG TERM (CURRENT) USE OF INSULIN (HCC): ICD-10-CM

## 2021-03-09 DIAGNOSIS — T81.31XD DISRUPTION OF EXTERNAL OPERATION (SURGICAL) WOUND, NOT ELSEWHERE CLASSIFIED, SUBSEQUENT ENCOUNTER: ICD-10-CM

## 2021-03-09 DIAGNOSIS — E11.59 TYPE 2 DIABETES MELLITUS WITH OTHER CIRCULATORY COMPLICATIONS (HCC): ICD-10-CM

## 2021-03-09 DIAGNOSIS — E11.621 TYPE 2 DIABETES MELLITUS WITH FOOT ULCER (HCC): ICD-10-CM

## 2021-03-09 LAB
GLUCOSE BLD-MCNC: 105 MG/DL (ref 70–99)
GLUCOSE BLD-MCNC: 116 MG/DL (ref 70–99)

## 2021-03-09 PROCEDURE — 82962 GLUCOSE BLOOD TEST: CPT

## 2021-03-09 PROCEDURE — 29581 APPL MULTLAYER CMPRN SYS LEG: CPT

## 2021-03-09 NOTE — PROGRESS NOTES
Subjective    Chief Complaint  This information was obtained from the patient  Patient is here for an follow up for wounds. Denies any pain at this time.     Allergies  No known allergies    HPI  This information was obtained from the patient, chart  2-23-2 weekly. patient has scant drainage from intermittent points on the incision but most likely related to the surrounding edema. There is not any s/s of infection and no c/o pain.     3-3-21 patient returns today, there are not any updated notes/labs in epic Psychiatric: Claustrophobia, Nervousness / Tension, Memory Loss, Depression    Additional Information  Medication reconciliation completed at today's visit. : Yes        Objective    Wound Assessment(s)  Wound #1 Left, Lateral Foot is a chronic [place] are 15cm length x 0.1cm width with no measurable depth, with an area of 1.5 sq cm . No tunneling has been noted. No sinus tract has been noted. No undermining has been noted. There was no drainage noted. The patient reports a wound pain of level 0/10.  The Musculoskeletal:  patient is in wheelchair propelled by others for long distances, able to transfer with assist.    Integumentary (Hair, Skin)  see wound documentation. Psychiatric:  Judgment and insight intact. Alert and oriented times 3.  No evidence o -  Can be Tuesday, Wed, Thursday  RN visit for assessment of wound(s). - as previously scheduled ON fRIDAY FOR DRESSING/WRAP CHANGE WITH NURSES    Wound #3 Left, Medial Lower Leg    Wound Cleansing & Dressings:  May shower with protection.   Iodosorb - smal confirmed by: - Pathology 2-4-21  A. Skin, left foot, excision:  -Gangrenous necrosis of skin and soft tissue with suppurative inflammation.     B. Left foot fifth metatarsal, excision:  -Gangrenous necrosis of soft tissue with underlying acute osteomyeliti

## 2021-03-12 ENCOUNTER — OFFICE VISIT (OUTPATIENT)
Dept: WOUND CARE | Facility: HOSPITAL | Age: 71
End: 2021-03-12
Attending: NURSE PRACTITIONER
Payer: MEDICARE

## 2021-03-12 DIAGNOSIS — E11.59 TYPE 2 DIABETES MELLITUS WITH OTHER CIRCULATORY COMPLICATIONS (HCC): ICD-10-CM

## 2021-03-12 DIAGNOSIS — T81.31XD DISRUPTION OF EXTERNAL OPERATION (SURGICAL) WOUND, NOT ELSEWHERE CLASSIFIED, SUBSEQUENT ENCOUNTER: ICD-10-CM

## 2021-03-12 DIAGNOSIS — L97.509 TYPE 2 DIABETES MELLITUS WITH FOOT ULCER (HCC): ICD-10-CM

## 2021-03-12 DIAGNOSIS — L97.524 NON-PRESSURE CHRONIC ULCER OF OTHER PART OF LEFT FOOT WITH NECROSIS OF BONE (HCC): Primary | ICD-10-CM

## 2021-03-12 DIAGNOSIS — Z79.4 LONG TERM (CURRENT) USE OF INSULIN (HCC): ICD-10-CM

## 2021-03-12 DIAGNOSIS — E11.621 TYPE 2 DIABETES MELLITUS WITH FOOT ULCER (HCC): ICD-10-CM

## 2021-03-12 LAB — GLUCOSE BLD-MCNC: 200 MG/DL (ref 70–99)

## 2021-03-12 PROCEDURE — 29581 APPL MULTLAYER CMPRN SYS LEG: CPT

## 2021-03-12 PROCEDURE — 82962 GLUCOSE BLOOD TEST: CPT

## 2021-03-15 DIAGNOSIS — Z23 NEED FOR VACCINATION: ICD-10-CM

## 2021-03-16 ENCOUNTER — OFFICE VISIT (OUTPATIENT)
Dept: WOUND CARE | Facility: HOSPITAL | Age: 71
End: 2021-03-16
Attending: NURSE PRACTITIONER
Payer: MEDICARE

## 2021-03-16 DIAGNOSIS — T81.31XD DISRUPTION OF EXTERNAL OPERATION (SURGICAL) WOUND, NOT ELSEWHERE CLASSIFIED, SUBSEQUENT ENCOUNTER: ICD-10-CM

## 2021-03-16 DIAGNOSIS — L97.524 NON-PRESSURE CHRONIC ULCER OF OTHER PART OF LEFT FOOT WITH NECROSIS OF BONE (HCC): Primary | ICD-10-CM

## 2021-03-16 DIAGNOSIS — Z79.4 LONG TERM (CURRENT) USE OF INSULIN (HCC): ICD-10-CM

## 2021-03-16 DIAGNOSIS — L97.509 TYPE 2 DIABETES MELLITUS WITH FOOT ULCER (HCC): ICD-10-CM

## 2021-03-16 DIAGNOSIS — E11.59 TYPE 2 DIABETES MELLITUS WITH OTHER CIRCULATORY COMPLICATIONS (HCC): ICD-10-CM

## 2021-03-16 DIAGNOSIS — E11.621 TYPE 2 DIABETES MELLITUS WITH FOOT ULCER (HCC): ICD-10-CM

## 2021-03-16 LAB — GLUCOSE BLD-MCNC: 174 MG/DL (ref 70–99)

## 2021-03-16 PROCEDURE — 82962 GLUCOSE BLOOD TEST: CPT

## 2021-03-16 PROCEDURE — 29581 APPL MULTLAYER CMPRN SYS LEG: CPT

## 2021-03-16 NOTE — PROGRESS NOTES
Subjective    Chief Complaint  This information was obtained from the patient  Patient is here for a follow up left lower leg.  Patients denies any concern and no issues with the wrap    Allergies  No known allergies    HPI  This information was obtained fr need to come to wound center 3 x weekly. patient has scant drainage from intermittent points on the incision but most likely related to the surrounding edema. There is not any s/s of infection and no c/o pain.     3-3-21 patient returns today, there are n denies complaints or symptoms related to:  Constitutional Symptoms (General Health):  Fatigue, Fever, Loss of Appetite, Marked Weight Change, Night Sweats, Chills  Respiratory: Cough, Shortness of Breath  Cardiovascular (Central/Peripheral): Chest Pain, Dys Wound bed has % slough. The periwound skin exhibited: Edema, Dry/Scaly, Hemosiderosis. The temperature of the periwound skin is Cool. Periwound skin does not exhibit signs or symptoms of infection. Local Pulse is Weak.     Wound #4 Left, Medial, Supe Pulse Regular and wnl for patient. [place] Respirations easy and unlabored. Temperature wnl. Weight normal for height. . Appearance neat and clean. Appears in no acute distress. Well nourished and well developed. Cardiovascular:  dp palpable bilaterally.  Extrem was performed. A 2 Layers Unna Boot was applied with moderate 15-25 mmhg. The procedure was tolerated well. Plan    Wound Orders:  Wound #1 Left, Lateral Foot    Wound Cleansing & Dressings:  May shower with protection.   Cleanse with Vashe  Prisca C soft tissue with underlying acute osteomyelitis. C. Left foot fifth metatarsal clearance fragment, excision:  -Bone with no significant acute inflammation.   2-4-21 , crp 9.95        patient doing well. foot wound improving, incisions have some sm

## 2021-03-23 ENCOUNTER — OFFICE VISIT (OUTPATIENT)
Dept: WOUND CARE | Facility: HOSPITAL | Age: 71
End: 2021-03-23
Attending: NURSE PRACTITIONER
Payer: MEDICARE

## 2021-03-23 DIAGNOSIS — L97.509 TYPE 2 DIABETES MELLITUS WITH FOOT ULCER (HCC): ICD-10-CM

## 2021-03-23 DIAGNOSIS — E11.59 TYPE 2 DIABETES MELLITUS WITH OTHER CIRCULATORY COMPLICATIONS (HCC): ICD-10-CM

## 2021-03-23 DIAGNOSIS — E11.621 TYPE 2 DIABETES MELLITUS WITH FOOT ULCER (HCC): ICD-10-CM

## 2021-03-23 DIAGNOSIS — Z79.4 LONG TERM (CURRENT) USE OF INSULIN (HCC): ICD-10-CM

## 2021-03-23 DIAGNOSIS — L97.524 NON-PRESSURE CHRONIC ULCER OF OTHER PART OF LEFT FOOT WITH NECROSIS OF BONE (HCC): Primary | ICD-10-CM

## 2021-03-23 DIAGNOSIS — T81.31XD DISRUPTION OF EXTERNAL OPERATION (SURGICAL) WOUND, NOT ELSEWHERE CLASSIFIED, SUBSEQUENT ENCOUNTER: ICD-10-CM

## 2021-03-23 LAB — GLUCOSE BLD-MCNC: 179 MG/DL (ref 70–99)

## 2021-03-23 PROCEDURE — 82962 GLUCOSE BLOOD TEST: CPT

## 2021-03-23 PROCEDURE — 29581 APPL MULTLAYER CMPRN SYS LEG: CPT

## 2021-03-23 NOTE — PROGRESS NOTES
Subjective    Chief Complaint  This information was obtained from the patient  Patient is here for a follow up left lower leg.     Allergies  No known allergies    HPI  This information was obtained from the patient, chart  2-23-21 INITIAL:  65year old mal drainage from intermittent points on the incision but most likely related to the surrounding edema. There is not any s/s of infection and no c/o pain.     3-3-21 patient returns today, there are not any updated notes/labs in [facility]. patient tolerating compre Cardiovascular (Central/Peripheral):  Other (left leg bypass 2-8-21, htn), Edema  Musculoskeletal: Other (+osteo left foot-clear margins)  Integumentary (Hair/Skin/Nails): Dryness, Calluses/Corns, Hyperpigmentation, Ulcers  Neurological: Loss of Protective 0.11 cubic cm. No tunneling has been noted. No sinus tract has been noted. No undermining has been noted. There is a scant amount of sero-sanguineous drainage noted which has no odor. The patient reports a wound pain of level 0/10.  The wound margin is well Local Pulse is Weak.   General Notes:  friable wound bed    Vitals  Height/Length: 69 in (175.26 cm), Weight: 170 lbs (77.27 kgs), BMI: 25.1, Temperature: 97.3 °F (36.28 °C), Pulse: 71 bpm, Respiratory Rate: 16 breaths/min, Blood Pressure: 103/57 mmHg, [place] (Encounter Diagnosis) T81.31XD - Disruption of external operation (surgical) wound, not elsewhere classified, subsequent encounter  (Encounter Diagnosis) E11.621 - Type 2 diabetes mellitus with foot ulcer  (Encounter Diagnosis) E11.59 - Type 2 diabetes nuno evaluated labs. - 2-20-21 bun 28, create 1.45, gfr48    Wound type: - dfu  Wound improving. No s/s of infection.   Perfusion assessed by doppler. - pulsatile signals at the dp/pt/distal hallux on the left, base of hallux on the right  Perfusion assessed by

## 2021-03-30 ENCOUNTER — OFFICE VISIT (OUTPATIENT)
Dept: WOUND CARE | Facility: HOSPITAL | Age: 71
End: 2021-03-30
Attending: NURSE PRACTITIONER
Payer: MEDICARE

## 2021-03-30 ENCOUNTER — HOSPITAL ENCOUNTER (OUTPATIENT)
Dept: ULTRASOUND IMAGING | Facility: HOSPITAL | Age: 71
Discharge: HOME | End: 2021-03-30
Attending: SURGERY
Payer: MEDICARE

## 2021-03-30 DIAGNOSIS — L97.524 NON-PRESSURE CHRONIC ULCER OF OTHER PART OF LEFT FOOT WITH NECROSIS OF BONE (HCC): Primary | ICD-10-CM

## 2021-03-30 DIAGNOSIS — I70.262 ATHEROSCLEROSIS OF NATIVE ARTERIES OF EXTREMITIES WITH GANGRENE, LEFT LEG (HCC): ICD-10-CM

## 2021-03-30 DIAGNOSIS — E11.59 TYPE 2 DIABETES MELLITUS WITH OTHER CIRCULATORY COMPLICATIONS (HCC): ICD-10-CM

## 2021-03-30 DIAGNOSIS — T82.318A BREAKDOWN (MECHANICAL) OF OTHER VASCULAR GRAFTS, INITIAL ENCOUNTER (HCC): ICD-10-CM

## 2021-03-30 DIAGNOSIS — E11.621 TYPE 2 DIABETES MELLITUS WITH FOOT ULCER (HCC): ICD-10-CM

## 2021-03-30 DIAGNOSIS — T81.31XD DISRUPTION OF EXTERNAL OPERATION (SURGICAL) WOUND, NOT ELSEWHERE CLASSIFIED, SUBSEQUENT ENCOUNTER: ICD-10-CM

## 2021-03-30 DIAGNOSIS — L97.509 TYPE 2 DIABETES MELLITUS WITH FOOT ULCER (HCC): ICD-10-CM

## 2021-03-30 DIAGNOSIS — Z79.4 LONG TERM (CURRENT) USE OF INSULIN (HCC): ICD-10-CM

## 2021-03-30 LAB — GLUCOSE BLD-MCNC: 136 MG/DL (ref 70–99)

## 2021-03-30 PROCEDURE — 93926 LOWER EXTREMITY STUDY: CPT | Performed by: SURGERY

## 2021-03-30 PROCEDURE — 29581 APPL MULTLAYER CMPRN SYS LEG: CPT

## 2021-03-30 PROCEDURE — 82962 GLUCOSE BLOOD TEST: CPT

## 2021-03-30 NOTE — PROGRESS NOTES
Subjective    Chief Complaint  This information was obtained from the patient  Patient is here for a follow up left lower leg. arrives from vascular appointment. with top of wrap cut off to ankle.     Allergies  No known allergies    HPI  This information w aware he will need to come to wound center 3 x weekly. patient has scant drainage from intermittent points on the incision but most likely related to the surrounding edema. There is not any s/s of infection and no c/o pain.     3-3-21 patient returns toda ultrasound with dr Kenzie Calle office and a post op visit with dr. Shital Anthony. Dr. Nikky Kelley instructed them to put neosporin on the medial ankle wound. we discussed that since the patient is in compression wrap we will continue to utilize honey on that ulceration. firm granulation. The periwound skin exhibited: Edema, Dry/Scaly. The periwound skin did not exhibit: Erythema, Hemosiderosis. The temperature of the periwound skin is WNL. Periwound skin does not exhibit signs or symptoms of infection.  Local Pulse is Wea Subsequent wound encounter measurements are 0.5cm length x 0.2cm width x 0.1cm depth, with an area of 0.1 sq cm and a volume of 0.01 cubic cm. No tunneling has been noted. No sinus tract has been noted. No undermining has been noted.  There is a small amoun Measurement 34 cm from heel  Ankle Measurement 11 cm from heel  Vascular Assessment:  Left Extremity colors, hair growth, and conditions:  Capillary Refill: < 3 seconds          Assessment    Active Problems    ICD-10  (Encounter Diagnosis) L97.524 - Non-p labs. - 2-20-21 bun 28, create 1.45, gfr48    Wound type: - dfu  Wound improving. No s/s of infection.   Perfusion assessed by doppler. - pulsatile signals at the dp/pt/distal hallux on the left, base of hallux on the right  Perfusion assessed by palpation

## 2021-04-06 ENCOUNTER — OFFICE VISIT (OUTPATIENT)
Dept: WOUND CARE | Facility: HOSPITAL | Age: 71
End: 2021-04-06
Attending: NURSE PRACTITIONER
Payer: MEDICARE

## 2021-04-06 DIAGNOSIS — L97.524 NON-PRESSURE CHRONIC ULCER OF OTHER PART OF LEFT FOOT WITH NECROSIS OF BONE (HCC): Primary | ICD-10-CM

## 2021-04-06 DIAGNOSIS — Z79.4 LONG TERM (CURRENT) USE OF INSULIN (HCC): ICD-10-CM

## 2021-04-06 DIAGNOSIS — E11.59 TYPE 2 DIABETES MELLITUS WITH OTHER CIRCULATORY COMPLICATIONS (HCC): ICD-10-CM

## 2021-04-06 DIAGNOSIS — E11.621 TYPE 2 DIABETES MELLITUS WITH FOOT ULCER (HCC): ICD-10-CM

## 2021-04-06 DIAGNOSIS — L97.509 TYPE 2 DIABETES MELLITUS WITH FOOT ULCER (HCC): ICD-10-CM

## 2021-04-06 DIAGNOSIS — T81.31XD DISRUPTION OF EXTERNAL OPERATION (SURGICAL) WOUND, NOT ELSEWHERE CLASSIFIED, SUBSEQUENT ENCOUNTER: ICD-10-CM

## 2021-04-06 PROCEDURE — 29581 APPL MULTLAYER CMPRN SYS LEG: CPT

## 2021-04-06 PROCEDURE — 82962 GLUCOSE BLOOD TEST: CPT

## 2021-04-06 NOTE — PROGRESS NOTES
Subjective    Chief Complaint  This information was obtained from the patient  Patient is here for a follow up left lower leg.  Patients denies any pain and no issues    Allergies  No known allergies    HPI  This information was obtained from the patient, [facility] 3 x weekly. patient has scant drainage from intermittent points on the incision but most likely related to the surrounding edema. There is not any s/s of infection and no c/o pain.     3-3-21 patient returns today, there are not any updated n post op visit with dr. Luis Luther. Dr. Hitesh Tejada instructed them to put neosporin on the medial ankle wound. we discussed that since the patient is in compression wrap we will continue to utilize honey on that ulceration.   I will update dr. Luis Luther via [facility] with th received a status of Bridged. Subsequent wound encounter measurements are 0cm length x 0cm width with no measurable depth, with an area of 0 sq cm . No tunneling has been noted. No sinus tract has been noted. No undermining has been noted.  There was no fidelia Dry/Scaly, Hemosiderosis. The temperature of the periwound skin is WNL. Periwound skin does not exhibit signs or symptoms of infection. Local Pulse is Weak.     Wound #5 Left, Medial Thigh is a chronic Full Thickness Surgical Wound and has received a status communicative. Appropriate interactions and affect.     Lower Extremity Assessment  Edema Assessment:  Left Extremity: Edema is present  Compression Device In Use: Yes  Device Used Correctly: Yes  Compression Device Used: Unna's or Duke Boot  Calf Measureme #4 Left, Medial, Superior Lower Leg    Wound Cleansing & Dressings:  May shower with protection.   Cleanse with Vashe  Steroid cream/ointment - betamethasone in periwound  Barrier ointment/paste/cream - zinc  Endoform Collagen  Hydrofera Transfer  ABD pad - clearance fragment, excision:  -Bone with no significant acute inflammation.   2-4-21 , crp 9.95        will utilize endoform and blue, if reminign areas do not fill in by next week consider culture and abx treatment    Electronic Signature(s)  Ami

## 2021-04-13 ENCOUNTER — OFFICE VISIT (OUTPATIENT)
Dept: WOUND CARE | Facility: HOSPITAL | Age: 71
End: 2021-04-13
Attending: NURSE PRACTITIONER
Payer: MEDICARE

## 2021-04-13 DIAGNOSIS — E11.59 TYPE 2 DIABETES MELLITUS WITH OTHER CIRCULATORY COMPLICATIONS (HCC): ICD-10-CM

## 2021-04-13 DIAGNOSIS — T14.8XXA INFECTED WOUND: Primary | ICD-10-CM

## 2021-04-13 DIAGNOSIS — T81.31XD DISRUPTION OF EXTERNAL OPERATION (SURGICAL) WOUND, NOT ELSEWHERE CLASSIFIED, SUBSEQUENT ENCOUNTER: ICD-10-CM

## 2021-04-13 DIAGNOSIS — L08.9 INFECTED WOUND: Primary | ICD-10-CM

## 2021-04-13 DIAGNOSIS — L97.509 TYPE 2 DIABETES MELLITUS WITH FOOT ULCER (HCC): ICD-10-CM

## 2021-04-13 DIAGNOSIS — L97.524 NON-PRESSURE CHRONIC ULCER OF OTHER PART OF LEFT FOOT WITH NECROSIS OF BONE (HCC): ICD-10-CM

## 2021-04-13 DIAGNOSIS — A49.9 BACTERIAL INFECTION, UNSPECIFIED: ICD-10-CM

## 2021-04-13 DIAGNOSIS — Z79.4 LONG TERM (CURRENT) USE OF INSULIN (HCC): ICD-10-CM

## 2021-04-13 DIAGNOSIS — E11.621 TYPE 2 DIABETES MELLITUS WITH FOOT ULCER (HCC): ICD-10-CM

## 2021-04-13 PROCEDURE — 82962 GLUCOSE BLOOD TEST: CPT

## 2021-04-13 PROCEDURE — 87077 CULTURE AEROBIC IDENTIFY: CPT

## 2021-04-13 PROCEDURE — 99214 OFFICE O/P EST MOD 30 MIN: CPT

## 2021-04-13 PROCEDURE — 87070 CULTURE OTHR SPECIMN AEROBIC: CPT

## 2021-04-13 PROCEDURE — 87205 SMEAR GRAM STAIN: CPT

## 2021-04-13 PROCEDURE — 87186 SC STD MICRODIL/AGAR DIL: CPT

## 2021-04-13 NOTE — PROGRESS NOTES
Subjective    Chief Complaint  This information was obtained from the patient  Patient is here for a follow up left lower leg denies any pain at this time.     Allergies  No known allergies    HPI  This information was obtained from the patient, chart  2-23 x weekly. patient has scant drainage from intermittent points on the incision but most likely related to the surrounding edema. There is not any s/s of infection and no c/o pain.     3-3-21 patient returns today, there are not any updated notes/labs in ep with dr. Sammie Wall. Dr. Aguila Burden instructed them to put neosporin on the medial ankle wound. we discussed that since the patient is in compression wrap we will continue to utilize honey on that ulceration.   I will update dr. Sammie Wall via [facility] with the wound care p Claustrophobia, Nervousness / Tension, Memory Loss, Depression    Additional Information  Medication reconciliation completed at today's visit. : Yes        Objective    Wound Assessment(s)  Wound #1 Left, Lateral Foot is a chronic White Grade 3 Diabetic volume of 0.032 cubic cm. No tunneling has been noted. No sinus tract has been noted. No undermining has been noted. There is a small amount of serous drainage noted which has no odor. The patient reports a wound pain of level 0/10.  The wound margin is wel from heel with left measurement of 22.5 cm  Right Extremity: Edema is present  Compression Device In Use: No  Calf Measurement 34 cm from heel  Ankle Measurement 11 cm from heel          Assessment    Active Problems    ICD-10  (Encounter Diagnosis) A49.9 pulsatile signals at the dp/pt/distal hallux on the left, base of hallux on the right  Perfusion assessed by palpation of pulses. Perfusion assessed by ultrasound. - 3-30-21  CONCLUSION:    1. Patent left lower extremity bypass graft.   2. No evidence of f

## 2021-04-15 NOTE — PROGRESS NOTES
Reviewed culture results with patient and informed to continue with topical and oral antibiotic prescribed. Patient stated he understood.   Danielle Kelly

## 2021-04-22 ENCOUNTER — APPOINTMENT (OUTPATIENT)
Dept: OPHTHALMOLOGY | Age: 71
End: 2021-04-22

## 2021-04-27 ENCOUNTER — OFFICE VISIT (OUTPATIENT)
Dept: WOUND CARE | Facility: HOSPITAL | Age: 71
End: 2021-04-27
Attending: NURSE PRACTITIONER
Payer: MEDICARE

## 2021-04-27 DIAGNOSIS — E11.59 TYPE 2 DIABETES MELLITUS WITH OTHER CIRCULATORY COMPLICATIONS (HCC): ICD-10-CM

## 2021-04-27 DIAGNOSIS — E11.621 TYPE 2 DIABETES MELLITUS WITH FOOT ULCER (HCC): ICD-10-CM

## 2021-04-27 DIAGNOSIS — L97.524 NON-PRESSURE CHRONIC ULCER OF OTHER PART OF LEFT FOOT WITH NECROSIS OF BONE (HCC): ICD-10-CM

## 2021-04-27 DIAGNOSIS — Z79.4 LONG TERM (CURRENT) USE OF INSULIN (HCC): ICD-10-CM

## 2021-04-27 DIAGNOSIS — B95.61 METHICILLIN SUSCEPTIBLE STAPHYLOCOCCUS AUREUS INFECTION AS THE CAUSE OF DISEASES CLASSIFIED ELSEWHERE: Primary | ICD-10-CM

## 2021-04-27 DIAGNOSIS — T81.31XD DISRUPTION OF EXTERNAL OPERATION (SURGICAL) WOUND, NOT ELSEWHERE CLASSIFIED, SUBSEQUENT ENCOUNTER: ICD-10-CM

## 2021-04-27 DIAGNOSIS — L97.509 TYPE 2 DIABETES MELLITUS WITH FOOT ULCER (HCC): ICD-10-CM

## 2021-04-27 PROCEDURE — 99214 OFFICE O/P EST MOD 30 MIN: CPT

## 2021-04-27 PROCEDURE — 82962 GLUCOSE BLOOD TEST: CPT

## 2021-04-27 NOTE — PROGRESS NOTES
Subjective    Chief Complaint  This information was obtained from the patient  Patient is here for a follow up left lower leg denies any pain at this time.     Allergies  No known allergies    HPI  This information was obtained from the patient, chart  2-23 x weekly. patient has scant drainage from intermittent points on the incision but most likely related to the surrounding edema. There is not any s/s of infection and no c/o pain.     HPI PRIOR TO APRIL 2021 PLEASE SEE INDIVIDUAL PROGRESS NOTES  4-6-21 pat Constitutional Symptoms (General Health):  Fatigue, Fever, Loss of Appetite, Marked Weight Change, Night Sweats, Chills  Respiratory: Cough, Shortness of Breath  Cardiovascular (Central/Peripheral): Chest Pain, Dyspnea on Exertion  Musculoskeletal: Contract temperature of the periwound skin is WNL. Periwound skin does not exhibit signs or symptoms of infection.  Local Pulse is Normal.    Vitals  Height/Length: 69 in (175.26 cm), Weight: 170 lbs (77.27 kgs), BMI: 25.1, Temperature: 98.0 °F (36.67 °C), Pulse: 66 other part of left foot with necrosis of bone  (Encounter Diagnosis) T81.31XD - Disruption of external operation (surgical) wound, not elsewhere classified, subsequent encounter  (Encounter Diagnosis) E11.621 - Type 2 diabetes mellitus with foot ulcer  (En colton  Last A1C Date and Value: - 2-2-21 a1c 6.8    Last Albumin Date and Value: - 2-2-21 albumin 2.3/tp7.7  Osteomyelitis confirmed by: - Pathology 2-4-21  A.  Skin, left foot, excision:  -Gangrenous necrosis of skin and soft tissue with suppurative in

## 2021-05-11 ENCOUNTER — OFFICE VISIT (OUTPATIENT)
Dept: WOUND CARE | Facility: HOSPITAL | Age: 71
End: 2021-05-11
Attending: NURSE PRACTITIONER
Payer: MEDICARE

## 2021-05-11 VITALS
TEMPERATURE: 98 F | SYSTOLIC BLOOD PRESSURE: 126 MMHG | RESPIRATION RATE: 16 BRPM | HEART RATE: 75 BPM | DIASTOLIC BLOOD PRESSURE: 70 MMHG | HEIGHT: 68 IN | BODY MASS INDEX: 25.76 KG/M2 | WEIGHT: 170 LBS

## 2021-05-11 DIAGNOSIS — T81.31XD DISRUPTION OF EXTERNAL OPERATION (SURGICAL) WOUND, NOT ELSEWHERE CLASSIFIED, SUBSEQUENT ENCOUNTER: ICD-10-CM

## 2021-05-11 DIAGNOSIS — B95.61 METHICILLIN SUSCEPTIBLE STAPHYLOCOCCUS AUREUS INFECTION AS THE CAUSE OF DISEASES CLASSIFIED ELSEWHERE: Primary | ICD-10-CM

## 2021-05-11 PROCEDURE — 99213 OFFICE O/P EST LOW 20 MIN: CPT | Performed by: NURSE PRACTITIONER

## 2021-05-11 NOTE — PATIENT INSTRUCTIONS
Wound Dressing Information may shower, rinse wound with saline, apply xeroform (greasy yellow gauze), cover with bandaid, apply compression sock     Return in about 1 week (around 5/18/2021).

## 2021-05-11 NOTE — PROGRESS NOTES
Patient presents with:  Wound Care: Patient is here for a follow up of  left lower leg wound       HPI:     This information was obtained from the patient, chart  2-23-21 INITIAL:  65year old male with past medical history significant for diabetes, diabet likely related to the surrounding edema. There is not any s/s of infection and no c/o pain.     HPI PRIOR TO APRIL 2021 PLEASE SEE INDIVIDUAL PROGRESS NOTES  4-6-21 patient returns today, 2 of the 4 wounds are resolved, the most proximal thigh wound and di mouth Daily Beta Blocker. , Disp: 60 tablet, Rfl: 3  •  atorvastatin 40 MG Oral Tab, Take 1 tablet (40 mg total) by mouth nightly., Disp: 30 tablet, Rfl: 3  •  aspirin 81 MG Oral Tab EC, Take 1 tablet (81 mg total) by mouth daily. , Disp: 30 tablet, Rfl: 0 seconds. Digits are warm. toenails are thickned, yellowed, and long in length lacking hygeine,. no hairgrowth on legs and feet. .    Musculoskeletal:  patient is ambulatory without difficulty    Integumentary (Hair, Skin)  see wound documentation.     Psychi APRN     - Type:    Spanda      - Location:    Left leg          ASSESSMENT AND PLAN:      1. Methicillin susceptible Staphylococcus aureus infection as the cause of diseases classified elsewhere    2.  Disruption of external operation (surgical) wound,

## 2021-05-11 NOTE — PROGRESS NOTES
Weekly Wound Education Note    Teaching Provided To: Patient; Family     Training Topics: Discharge instructions;Cleasing and general instructions; Compression;Dressing        Notes: Improving, medina/xeroform to wound. Spandagrip D applied.  Wife educated on

## 2021-05-19 ENCOUNTER — OFFICE VISIT (OUTPATIENT)
Dept: WOUND CARE | Facility: HOSPITAL | Age: 71
End: 2021-05-19
Attending: NURSE PRACTITIONER
Payer: MEDICARE

## 2021-05-19 VITALS
TEMPERATURE: 98 F | HEART RATE: 66 BPM | SYSTOLIC BLOOD PRESSURE: 153 MMHG | RESPIRATION RATE: 16 BRPM | DIASTOLIC BLOOD PRESSURE: 76 MMHG

## 2021-05-19 DIAGNOSIS — Z79.4 TYPE 2 DIABETES MELLITUS WITH FOOT ULCER, WITH LONG-TERM CURRENT USE OF INSULIN (HCC): ICD-10-CM

## 2021-05-19 DIAGNOSIS — T81.31XD DISRUPTION OF EXTERNAL OPERATION (SURGICAL) WOUND, NOT ELSEWHERE CLASSIFIED, SUBSEQUENT ENCOUNTER: Primary | ICD-10-CM

## 2021-05-19 DIAGNOSIS — L97.509 TYPE 2 DIABETES MELLITUS WITH FOOT ULCER, WITH LONG-TERM CURRENT USE OF INSULIN (HCC): ICD-10-CM

## 2021-05-19 DIAGNOSIS — E11.621 TYPE 2 DIABETES MELLITUS WITH FOOT ULCER, WITH LONG-TERM CURRENT USE OF INSULIN (HCC): ICD-10-CM

## 2021-05-19 PROCEDURE — 99214 OFFICE O/P EST MOD 30 MIN: CPT | Performed by: NURSE PRACTITIONER

## 2021-05-19 NOTE — PROGRESS NOTES
Patient presents with:  Wound Care: Patient denies any new wound concerns.       HPI:     This information was obtained from the patient, chart  2-23-21 INITIAL:  65year old male with past medical history significant for diabetes, diabetic neuropathy prese the surrounding edema. There is not any s/s of infection and no c/o pain.     HPI PRIOR TO APRIL 2021 PLEASE SEE INDIVIDUAL PROGRESS NOTES  4-6-21 patient returns today, 2 of the 4 wounds are resolved, the most proximal thigh wound and distal lower leg/ank spandagrip above the malleolous so his foot is very edematous. There is an sdti in a linear pattern on dorsal foot. The ankle wound is fragilly resolved.  We discussed wrapping with compression wrap to allow the previously wounded area to increase in tensil groomed. Constitutional  bp wnl for patient. Pulse Regular and wnl for patient. Woody Krishnamurthy Respirations easy and unlabored. Temperature wnl. Weight normal for height. . Appearance neat and clean. Appears in no acute distress. Well nourished and well developed. Granulation (%) 100 % 100 %   Margins Well-defined edges Well-defined edges   Non-staged Wound Description Full thickness Full thickness       Inactive Orders   Date Order Priority Status Authorizing Provider   05/11/21 0454 OP WOUND DRESSING Routine Compl

## 2021-05-19 NOTE — PROGRESS NOTES
.Weekly Wound Education Note    Teaching Provided To: Patient; Family  Training Topics: Discharge instructions; Compression;Edema control  Training Method: Explain/Verbal  Training Response: Patient responds and understands         Patient wrapped in [address]

## 2021-05-19 NOTE — PATIENT INSTRUCTIONS
Return in about 1 week (around 5/26/2021). You may shower with protection of the wound (ie a cast cover or similar).     Over the ankle previous wound site: open xeroform  Over dorsal foot: betadine to kerramax  Compression:  Calamine coflex    Do not ge

## 2021-05-24 ENCOUNTER — TELEPHONE (OUTPATIENT)
Dept: OPHTHALMOLOGY | Age: 71
End: 2021-05-24

## 2021-05-26 ENCOUNTER — OFFICE VISIT (OUTPATIENT)
Dept: WOUND CARE | Facility: HOSPITAL | Age: 71
End: 2021-05-26
Attending: NURSE PRACTITIONER
Payer: MEDICARE

## 2021-05-26 VITALS
RESPIRATION RATE: 14 BRPM | SYSTOLIC BLOOD PRESSURE: 153 MMHG | DIASTOLIC BLOOD PRESSURE: 73 MMHG | HEART RATE: 76 BPM | TEMPERATURE: 98 F

## 2021-05-26 DIAGNOSIS — T81.31XD DISRUPTION OF EXTERNAL OPERATION (SURGICAL) WOUND, NOT ELSEWHERE CLASSIFIED, SUBSEQUENT ENCOUNTER: Primary | ICD-10-CM

## 2021-05-26 PROCEDURE — 99213 OFFICE O/P EST LOW 20 MIN: CPT | Performed by: NURSE PRACTITIONER

## 2021-05-26 NOTE — PROGRESS NOTES
CHIEF COMPLAINT:   Patient presents with:  Wound Care: Patients is here for a follow up.  He stated no issues with the wrap and no pain      HPI:   This information was obtained from the patient, chart  2-23-21 INITIAL:  65year old male with past medical h intermittent points on the incision but most likely related to the surrounding edema. There is not any s/s of infection and no c/o pain.     HPI PRIOR TO MAY 2021 PLEASE SEE INDIVIDUAL PROGRESS NOTES   5-11-21 patient returns today with spouse.  He has con total) by mouth nightly., Disp: 30 tablet, Rfl: 3  •  aspirin 81 MG Oral Tab EC, Take 1 tablet (81 mg total) by mouth daily. , Disp: 30 tablet, Rfl: 0  •  Clopidogrel Bisulfate 75 MG Oral Tab, Take 1 tablet (75 mg total) by mouth daily. , Disp: 30 tablet, Rf difficulty     Integumentary (Hair, Skin)  see wound documentation. Patient with dti in linear pattern on dorsal foot measures 1x3, it is partially nonblancheable purple and partial intact ss filled blister.  No surrounding redness     Psychiatric:  Judgmen medial ankle/leg, betadine and kerramax to dorsal foot, calamin compression     - Cleansing:    Cleanse with soap and water     - Cleansing:    Cleanse with normal saline or wound cleanser     - Frequency:    Change dressing weekly   05/11/21 8793 OP WOUND THAT YOU GET IN THE MAIL    You may shower with protection of the wound (ie a cast cover or similar). Over dorsal foot: betadine to kerramax  Compression:  Calamine coflex    Do not get leg(s) with compression wrap wet.  If wraps are too tight as indicat

## 2021-05-26 NOTE — PROGRESS NOTES
.Weekly Wound Education Note    Teaching Provided To: Patient  Training Topics: Compression;Dressing; Discharge instructions;Edema control  Training Method: Explain/Verbal  Training Response: Patient responds and understands      Mediven 2 layer stockings o

## 2021-05-26 NOTE — PATIENT INSTRUCTIONS
Return in about 8 days (around 6/3/2021). BRING YOUR STOCKINGS WITH YOU THAT YOU GET IN THE MAIL    You may shower with protection of the wound (ie a cast cover or similar).     Over dorsal foot: betadine to kerramax  Compression:  Calamine coflex    Do

## 2021-06-03 ENCOUNTER — OFFICE VISIT (OUTPATIENT)
Dept: WOUND CARE | Facility: HOSPITAL | Age: 71
End: 2021-06-03
Attending: NURSE PRACTITIONER
Payer: MEDICARE

## 2021-06-03 VITALS — HEART RATE: 60 BPM | SYSTOLIC BLOOD PRESSURE: 146 MMHG | DIASTOLIC BLOOD PRESSURE: 72 MMHG | TEMPERATURE: 97 F

## 2021-06-03 DIAGNOSIS — E11.621 TYPE 2 DIABETES MELLITUS WITH FOOT ULCER, WITH LONG-TERM CURRENT USE OF INSULIN (HCC): ICD-10-CM

## 2021-06-03 DIAGNOSIS — L97.509 TYPE 2 DIABETES MELLITUS WITH FOOT ULCER, WITH LONG-TERM CURRENT USE OF INSULIN (HCC): ICD-10-CM

## 2021-06-03 DIAGNOSIS — Z79.4 TYPE 2 DIABETES MELLITUS WITH FOOT ULCER, WITH LONG-TERM CURRENT USE OF INSULIN (HCC): ICD-10-CM

## 2021-06-03 DIAGNOSIS — T81.31XD DISRUPTION OF EXTERNAL OPERATION (SURGICAL) WOUND, NOT ELSEWHERE CLASSIFIED, SUBSEQUENT ENCOUNTER: Primary | ICD-10-CM

## 2021-06-03 PROCEDURE — 99213 OFFICE O/P EST LOW 20 MIN: CPT | Performed by: NURSE PRACTITIONER

## 2021-06-03 NOTE — PATIENT INSTRUCTIONS
Return in about 1 week (around 6/10/2021). BRING YOUR STOCKINGS WITH YOU THAT YOU GET IN THE MAIL    You may shower with protection of the wound (ie a cast cover or similar).     Over dorsal foot: silver foam  Compression:  Calamine coflex    Do not get

## 2021-06-03 NOTE — PROGRESS NOTES
CHIEF COMPLAINT:   Patient presents with:  Wound Care: Patient inClinic for follow up visit for left foot wound.  Denies pain or issues with dressing      HPI:   This information was obtained from the patient, chart  2-23-21 INITIAL:  67 year old male with drainage from intermittent points on the incision but most likely related to the surrounding edema. Roscoe Louisch is not any s/s of infection and no c/o pain.     HPI PRIOR TO MAY 2021 PLEASE SEE INDIVIDUAL PROGRESS NOTES   5-11-21 patient returns today with spou edema. Patient is agreeable to having the compression wrap on for another week. No s./s of infection and no c/o pain.       CURRENT MEDICATIONS:     Current Outpatient Medications:   •  Metoprolol Succinate ER 25 MG Oral Tablet 24 Hr, Take 1 tablet (25 mg t in no acute distress. Well nourished and well developed.     Cardiovascular:  dp palpable left. left lower Extremity free of varicosities, + edema , skin is dry. Capillary refill < 3 seconds. Digits are warm.  toenails are thickned, yellowed, and long in le WOUND DRESSING Routine Completed VIKTORIA Valverde     - Additional Wound Dressing Information:    BETADINE AND Denyce Mow FOOT TO ALFONSO Spencer Loge   05/19/21 8326 OP WOUND DRESSING Routine Completed VIKTORIA Valverde     - Additional Wound Dressin (around 6/10/2021). I spent  20   minutes with the patient.  This time included:    preparing to see the patient (eg, review notes and recent diagnostics),  seeing the patient, performing a medically appropriate examination and/or evaluation, counseling

## 2021-06-03 NOTE — PROGRESS NOTES
.Weekly Wound Education Note    Teaching Provided To: Patient; Family  Training Topics: Discharge instructions;Edema control; Compression  Training Method: Explain/Verbal  Training Response: Patient responds and understands           Patient waiting for comp

## 2021-06-10 ENCOUNTER — OFFICE VISIT (OUTPATIENT)
Dept: WOUND CARE | Facility: HOSPITAL | Age: 71
End: 2021-06-10
Attending: NURSE PRACTITIONER
Payer: MEDICARE

## 2021-06-10 VITALS
DIASTOLIC BLOOD PRESSURE: 52 MMHG | TEMPERATURE: 98 F | SYSTOLIC BLOOD PRESSURE: 121 MMHG | HEART RATE: 65 BPM | RESPIRATION RATE: 14 BRPM

## 2021-06-10 DIAGNOSIS — L97.509 TYPE 2 DIABETES MELLITUS WITH FOOT ULCER, WITH LONG-TERM CURRENT USE OF INSULIN (HCC): Primary | ICD-10-CM

## 2021-06-10 DIAGNOSIS — E11.621 TYPE 2 DIABETES MELLITUS WITH FOOT ULCER, WITH LONG-TERM CURRENT USE OF INSULIN (HCC): Primary | ICD-10-CM

## 2021-06-10 DIAGNOSIS — T81.31XD DISRUPTION OF EXTERNAL OPERATION (SURGICAL) WOUND, NOT ELSEWHERE CLASSIFIED, SUBSEQUENT ENCOUNTER: ICD-10-CM

## 2021-06-10 DIAGNOSIS — Z79.4 LONG TERM (CURRENT) USE OF INSULIN (HCC): ICD-10-CM

## 2021-06-10 DIAGNOSIS — Z79.4 TYPE 2 DIABETES MELLITUS WITH FOOT ULCER, WITH LONG-TERM CURRENT USE OF INSULIN (HCC): Primary | ICD-10-CM

## 2021-06-10 PROCEDURE — 99213 OFFICE O/P EST LOW 20 MIN: CPT | Performed by: NURSE PRACTITIONER

## 2021-06-10 NOTE — PROGRESS NOTES
CHIEF COMPLAINT:   Patient presents with:  Wound Care: Patients is here for a follow up lower leg and He stated no issues with the wrap       HPI:   This information was obtained from the patient, chart  2-23-21 INITIAL:  67 year old male with past medical intermittent points on the incision but most likely related to the surrounding edema. Woodrow Masters is not any s/s of infection and no c/o pain.     HPI PRIOR TO MAY 2021 PLEASE SEE INDIVIDUAL PROGRESS NOTES  5-11-21 patient returns today with spouse.  He has cont is agreeable to having the compression wrap on for another week. No s./s of infection and no c/o pain. 6-10-21 patient returns today with spouse. He received his compression stockings.  He is healed, we discussed use of compression, monitoring his feet, for patient. Pulse Regular and wnl for patient. Myranda Atlanta Respirations easy and unlabored. Temperature wnl. Weight normal for height. . Appearance neat and clean. Appears in no acute distress. Well nourished and well developed.     Cardiovascular:  dp palpable left. Inactive Orders   Date Order Priority Status Authorizing Provider   05/26/21 1545 OP WOUND DRESSING Routine Completed Shonna Foley APRN     - Additional Wound Dressing Information:    BETADINE AND KERRAMAX DORSAL FOOT TO Donavan Guevara   05/19/21 9906 OP Questions and concerns addressed. Red flags to RTC or ED reviewed. Patient (or parent) agrees to plan. Patient discharged from clinic, to follow-up as needed. I spent 20   minutes with the patient.  This time included:    preparing to see the patie

## 2021-06-10 NOTE — PROGRESS NOTES
Weekly Wound Education Note    Teaching Provided To: Patient  Training Topics: Cleasing and general instructions; Compression;Edema control;Skin care; Foot care  Training Method: Explain/Verbal  Training Response: Patient responds and understands        Note

## 2021-06-30 ENCOUNTER — TELEPHONE (OUTPATIENT)
Dept: CARDIOLOGY | Age: 71
End: 2021-06-30

## 2021-06-30 RX ORDER — CLOPIDOGREL BISULFATE 75 MG/1
75 TABLET ORAL DAILY
Qty: 90 TABLET | Refills: 3 | Status: SHIPPED | OUTPATIENT
Start: 2021-06-30

## 2021-06-30 RX ORDER — ATORVASTATIN CALCIUM 40 MG/1
40 TABLET, FILM COATED ORAL AT BEDTIME
COMMUNITY
End: 2021-06-30 | Stop reason: SDUPTHER

## 2021-06-30 RX ORDER — CLOPIDOGREL BISULFATE 75 MG/1
75 TABLET ORAL DAILY
COMMUNITY
End: 2021-06-30 | Stop reason: SDUPTHER

## 2021-06-30 RX ORDER — ATORVASTATIN CALCIUM 40 MG/1
40 TABLET, FILM COATED ORAL AT BEDTIME
Qty: 90 TABLET | Refills: 0 | Status: SHIPPED | OUTPATIENT
Start: 2021-06-30

## 2021-10-13 ENCOUNTER — OFFICE VISIT (OUTPATIENT)
Dept: PODIATRY CLINIC | Facility: CLINIC | Age: 71
End: 2021-10-13
Payer: MEDICARE

## 2021-10-13 DIAGNOSIS — I73.9 PERIPHERAL VASCULAR DISEASE (HCC): Primary | ICD-10-CM

## 2021-10-13 DIAGNOSIS — E11.42 DIABETIC POLYNEUROPATHY ASSOCIATED WITH TYPE 2 DIABETES MELLITUS (HCC): ICD-10-CM

## 2021-10-13 DIAGNOSIS — B35.1 ONYCHOMYCOSIS: ICD-10-CM

## 2021-10-13 DIAGNOSIS — L85.9 EPIDERMAL THICKENING: ICD-10-CM

## 2021-10-13 PROCEDURE — 11721 DEBRIDE NAIL 6 OR MORE: CPT | Performed by: STUDENT IN AN ORGANIZED HEALTH CARE EDUCATION/TRAINING PROGRAM

## 2021-10-13 PROCEDURE — 11055 PARING/CUTG B9 HYPRKER LES 1: CPT | Performed by: STUDENT IN AN ORGANIZED HEALTH CARE EDUCATION/TRAINING PROGRAM

## 2021-10-13 NOTE — PROGRESS NOTES
[facility] Podiatry  Progress Note    Rafita Lane is a 66year old male. Patient presents with:  New Patient: routine nail care and foot check.         HPI:     Patient is a 64-year-old diabetic male with history of peripheral vascular disease who re unusual skin lesions or rashes  RESPIRATORY: denies shortness of breath with exertion  CARDIOVASCULAR: denies chest pain on exertion  GI: denies abdominal pain and denies heartburn  NEURO: denies headaches  MUSCULO: denies arthritis, back pain      EXAM: wearing supportive shoe gear and avoiding walking barefoot with patient.  -Educated patient on acute signs of infection advised patient to seek immediate medical attention if symptoms arise.       The patient indicates understanding of these issues and agre

## 2021-10-16 NOTE — PATIENT INSTRUCTIONS
-Wear supportive shoe gear and inserts at all times with ambulation. Avoid walking barefoot. -Monitor site of preulcerative lesion to left hallux and seek immediate medical attention if any acute signs of infection arise.

## 2021-11-01 ENCOUNTER — HOSPITAL ENCOUNTER (OUTPATIENT)
Dept: LAB | Facility: HOSPITAL | Age: 71
Discharge: HOME | End: 2021-11-01
Attending: INTERNAL MEDICINE
Payer: MEDICARE

## 2021-11-01 PROCEDURE — 80061 LIPID PANEL: CPT | Performed by: INTERNAL MEDICINE

## 2021-11-01 PROCEDURE — 80053 COMPREHEN METABOLIC PANEL: CPT | Performed by: INTERNAL MEDICINE

## 2021-11-01 PROCEDURE — 36415 COLL VENOUS BLD VENIPUNCTURE: CPT | Performed by: INTERNAL MEDICINE

## 2021-11-04 ENCOUNTER — OFFICE VISIT (OUTPATIENT)
Dept: PODIATRY CLINIC | Facility: CLINIC | Age: 71
End: 2021-11-04
Payer: MEDICARE

## 2021-11-04 DIAGNOSIS — E11.42 DIABETIC POLYNEUROPATHY ASSOCIATED WITH TYPE 2 DIABETES MELLITUS (HCC): Primary | ICD-10-CM

## 2021-11-04 DIAGNOSIS — I73.9 PERIPHERAL VASCULAR DISEASE (HCC): ICD-10-CM

## 2021-11-04 DIAGNOSIS — Z89.429 HISTORY OF COMPLETE RAY AMPUTATION OF FIFTH TOE (HCC): ICD-10-CM

## 2021-11-04 DIAGNOSIS — L85.9 EPIDERMAL THICKENING: ICD-10-CM

## 2021-11-04 PROCEDURE — 99213 OFFICE O/P EST LOW 20 MIN: CPT | Performed by: STUDENT IN AN ORGANIZED HEALTH CARE EDUCATION/TRAINING PROGRAM

## 2021-11-04 NOTE — PATIENT INSTRUCTIONS
-Acquire custom diabetic shoes and inserts and begin using it with ambulation.  -Discussed importance of proper pedal hygiene, regular foot checks, and tight glucose control.  -Patient to avoid walking barefoot.  -Patient to monitor for acute signs of infe

## 2021-11-04 NOTE — PROGRESS NOTES
[facility] Podiatry  Progress Note    Tommy Candelaria is a 66year old male. Patient presents with: Follow - Up: left foot follow up. denies pain. no swelling.         HPI:     Patient is a 17-year-old diabetic male with history of peripheral vascular d otherwise  SKIN: denies any unusual skin lesions or rashes  RESPIRATORY: denies shortness of breath with exertion  CARDIOVASCULAR: denies chest pain on exertion  GI: denies abdominal pain and denies heartburn  NEURO: denies headaches  MUSCULO: denies arthr prescription for diabetic shoes and accommodative inserts.  -Discussed importance of wearing supportive shoe gear and avoiding walking barefoot with patient.  -Patient does not need to complete vascular testing I ordered as he recently underwent testing in

## 2021-11-24 NOTE — PROGRESS NOTES
Per 11/4/21 office visit. Patient did not have to complete order as following up with Dr. Salma Barron.

## 2021-12-08 ENCOUNTER — IMMUNIZATION (OUTPATIENT)
Dept: LAB | Facility: HOSPITAL | Age: 71
End: 2021-12-08
Attending: EMERGENCY MEDICINE
Payer: MEDICARE

## 2021-12-08 DIAGNOSIS — Z23 NEED FOR VACCINATION: Primary | ICD-10-CM

## 2021-12-08 PROCEDURE — 0004A SARSCOV2 VAC 30MCG/0.3ML IM: CPT

## 2021-12-10 ENCOUNTER — LAB ENCOUNTER (OUTPATIENT)
Dept: LAB | Facility: HOSPITAL | Age: 71
End: 2021-12-10
Attending: INTERNAL MEDICINE
Payer: MEDICARE

## 2021-12-10 DIAGNOSIS — I25.5 ISCHEMIC CARDIOMYOPATHY: ICD-10-CM

## 2021-12-10 PROCEDURE — 36415 COLL VENOUS BLD VENIPUNCTURE: CPT

## 2021-12-10 PROCEDURE — 85610 PROTHROMBIN TIME: CPT

## 2021-12-10 PROCEDURE — 85025 COMPLETE CBC W/AUTO DIFF WBC: CPT

## 2021-12-10 PROCEDURE — 80048 BASIC METABOLIC PNL TOTAL CA: CPT

## 2021-12-13 ENCOUNTER — HOSPITAL ENCOUNTER (OUTPATIENT)
Dept: INTERVENTIONAL RADIOLOGY/VASCULAR | Facility: HOSPITAL | Age: 71
Discharge: HOME | End: 2021-12-14
Attending: INTERNAL MEDICINE | Admitting: INTERNAL MEDICINE
Payer: MEDICARE

## 2021-12-13 DIAGNOSIS — I25.5 CARDIOMYOPATHY, ISCHEMIC: ICD-10-CM

## 2021-12-13 DIAGNOSIS — I25.5 ISCHEMIC CARDIOMYOPATHY: Primary | ICD-10-CM

## 2021-12-13 PROCEDURE — 99223 1ST HOSP IP/OBS HIGH 75: CPT | Performed by: HOSPITALIST

## 2021-12-13 PROCEDURE — B211YZZ FLUOROSCOPY OF MULTIPLE CORONARY ARTERIES USING OTHER CONTRAST: ICD-10-PCS | Performed by: INTERNAL MEDICINE

## 2021-12-13 PROCEDURE — B215YZZ FLUOROSCOPY OF LEFT HEART USING OTHER CONTRAST: ICD-10-PCS | Performed by: INTERNAL MEDICINE

## 2021-12-13 PROCEDURE — 4A023N7 MEASUREMENT OF CARDIAC SAMPLING AND PRESSURE, LEFT HEART, PERCUTANEOUS APPROACH: ICD-10-PCS | Performed by: INTERNAL MEDICINE

## 2021-12-13 RX ORDER — DEXTROSE MONOHYDRATE 25 G/50ML
50 INJECTION, SOLUTION INTRAVENOUS
Status: DISCONTINUED | OUTPATIENT
Start: 2021-12-13 | End: 2021-12-14

## 2021-12-13 RX ORDER — SODIUM CHLORIDE 9 MG/ML
INJECTION, SOLUTION INTRAVENOUS
Status: DISCONTINUED | OUTPATIENT
Start: 2021-12-14 | End: 2021-12-13 | Stop reason: HOSPADM

## 2021-12-13 RX ORDER — ASPIRIN 81 MG/1
324 TABLET, CHEWABLE ORAL ONCE
Status: DISCONTINUED | OUTPATIENT
Start: 2021-12-13 | End: 2021-12-13 | Stop reason: HOSPADM

## 2021-12-13 RX ORDER — LIDOCAINE HYDROCHLORIDE 10 MG/ML
INJECTION, SOLUTION EPIDURAL; INFILTRATION; INTRACAUDAL; PERINEURAL
Status: COMPLETED
Start: 2021-12-13 | End: 2021-12-13

## 2021-12-13 RX ORDER — HEPARIN SODIUM 5000 [USP'U]/ML
INJECTION, SOLUTION INTRAVENOUS; SUBCUTANEOUS
Status: COMPLETED
Start: 2021-12-13 | End: 2021-12-13

## 2021-12-13 RX ORDER — ATORVASTATIN CALCIUM 40 MG/1
40 TABLET, FILM COATED ORAL NIGHTLY
Status: DISCONTINUED | OUTPATIENT
Start: 2021-12-13 | End: 2021-12-14

## 2021-12-13 RX ORDER — METOPROLOL SUCCINATE 25 MG/1
25 TABLET, EXTENDED RELEASE ORAL
Status: DISCONTINUED | OUTPATIENT
Start: 2021-12-14 | End: 2021-12-14

## 2021-12-13 RX ORDER — CLOPIDOGREL BISULFATE 75 MG/1
75 TABLET ORAL DAILY
Status: DISCONTINUED | OUTPATIENT
Start: 2021-12-14 | End: 2021-12-13

## 2021-12-13 RX ORDER — ASPIRIN 81 MG/1
81 TABLET ORAL DAILY
Status: DISCONTINUED | OUTPATIENT
Start: 2021-12-14 | End: 2021-12-14

## 2021-12-13 RX ORDER — MIDAZOLAM HYDROCHLORIDE 1 MG/ML
INJECTION INTRAMUSCULAR; INTRAVENOUS
Status: COMPLETED
Start: 2021-12-13 | End: 2021-12-13

## 2021-12-13 RX ADMIN — ATORVASTATIN CALCIUM 40 MG: 40 TABLET, FILM COATED ORAL at 21:59:00

## 2021-12-13 NOTE — PROGRESS NOTES
67 y/o male with hx feb tib bypass, DM,HTN and LV dyusfxn    LV EF 40%  Left main 90%  LAD 99%  Diag 90%  Circ 90%  %    Rec CABG

## 2021-12-13 NOTE — PLAN OF CARE
Assumed pt care at 0730. A&Ox4. RA, denies chest pain/SOB, lung sounds clear, VSS, NSR/SB on tele. Voids. Up with SBA. R groin dressing CDI, soft no hematoma.  POC post angiogram monitor groin, possible cabg, POC updated with pt and family, questions answer ADULT  Goal: Verbalizes/displays adequate comfort level or patient's stated pain goal  Description: INTERVENTIONS:  - Encourage pt to monitor pain and request assistance  - Assess pain using appropriate pain scale  - Administer analgesics based on type and Identify discharge learning needs (meds, wound care, etc)  - Arrange for interpreters to assist at discharge as needed  - Consider post-discharge preferences of patient/family/discharge partner  - Complete POLST form as appropriate  - Assess patient's abil

## 2021-12-13 NOTE — H&P
[facility] Cardiology  Report of Admission    Alethea Lucero Patient Status:  Outpatient in a Bed    1950 MRN [id number]   [facility] Dar[facility]   Hosp Day # 0 PCP Jazlyn Roa     [facility] positives and negatives noted in the the HPI. Physical Exam:  Blood pressure 138/63, pulse (!) 49, temperature 98 °F (36.7 °C), temperature source Temporal, resp. rate 14, height 5' 8\" (1.727 m), weight 160 lb (72.6 kg), SpO2 99 %.   Temp (24hrs), Avg: in this patient. We will have primary care help us with management of this    3. Chronic renal sufficiency -patient's creatinine is about 161 7. This will need to be followed closely. 4. PAD -patient is status post femoral to tibial bypass.   He has

## 2021-12-13 NOTE — CONSULTS
[facility]  CONSULT     Jasper Geovanni Patient Status:  Inpatient    1950 MRN [id number]   [facility] 2NE-A Attending Derick Landrum MD   Hosp Day # 0  [facility]     Reason for consult: DM   Requested by: Dr. Lendia Lombard Left arm)   Pulse 56   Temp 97.8 °F (36.6 °C) (Oral)   Resp 18   Ht 5' 8\" (1.727 m)   Wt 160 lb (72.6 kg)   SpO2 100%   BMI 24.33 kg/m²   General: No acute distress. Alert and oriented x 3. HEENT: Normocephalic atraumatic. EOM-I. Anicteric.   Neck: Supple

## 2021-12-13 NOTE — PROGRESS NOTES
Rc'd pt from cath lab s/p [facility] in stable condition. VSS. Angioseal to right groin is soft, clean and dry. No bleeding or hematoma. Pt denies c/o pain or discomfort. Dr Willian Reilly at bedside with pt and pts wife. Pt to be admitted, for CABG consult.  Awaiting

## 2021-12-14 ENCOUNTER — APPOINTMENT (OUTPATIENT)
Dept: GENERAL RADIOLOGY | Facility: HOSPITAL | Age: 71
End: 2021-12-14
Attending: THORACIC SURGERY (CARDIOTHORACIC VASCULAR SURGERY)
Payer: MEDICARE

## 2021-12-14 VITALS
DIASTOLIC BLOOD PRESSURE: 66 MMHG | SYSTOLIC BLOOD PRESSURE: 139 MMHG | TEMPERATURE: 98 F | HEIGHT: 68 IN | OXYGEN SATURATION: 100 % | BODY MASS INDEX: 24.25 KG/M2 | RESPIRATION RATE: 18 BRPM | WEIGHT: 160 LBS | HEART RATE: 56 BPM

## 2021-12-14 PROBLEM — I25.5 ISCHEMIC CARDIOMYOPATHY: Status: ACTIVE | Noted: 2021-12-14

## 2021-12-14 PROCEDURE — 71045 X-RAY EXAM CHEST 1 VIEW: CPT | Performed by: THORACIC SURGERY (CARDIOTHORACIC VASCULAR SURGERY)

## 2021-12-14 PROCEDURE — 99232 SBSQ HOSP IP/OBS MODERATE 35: CPT | Performed by: HOSPITALIST

## 2021-12-14 RX ORDER — METOPROLOL SUCCINATE 25 MG/1
25 TABLET, EXTENDED RELEASE ORAL
Qty: 60 TABLET | Refills: 5 | Status: ON HOLD | OUTPATIENT
Start: 2021-12-14 | End: 2021-12-27

## 2021-12-14 RX ADMIN — METOPROLOL SUCCINATE 25 MG: 25 TABLET, EXTENDED RELEASE ORAL at 05:26:00

## 2021-12-14 NOTE — PLAN OF CARE
Explained discharge instructions including medications and follow-ups to the patient, verbalized understanding, IV removed, tele monitor discontinued, will be transported via wheelchair.     Pt understands to stop plavix, continue aspirin-- stop day of surg baseline  Description: INTERVENTIONS:  - Continuous cardiac monitoring, monitor vital signs, obtain 12 lead EKG if indicated  - Evaluate effectiveness of antiarrhythmic and heart rate control medications as ordered  - Initiate emergency measures for life t assessment.  - Educate pt/family on patient safety including physical limitations  - Instruct pt to call for assistance with activity based on assessment  - Modify environment to reduce risk of injury  - Provide assistive devices as appropriate  - Consider Completed  12/14/2021 1627 by Rola Brink, RN  Outcome: Progressing

## 2021-12-14 NOTE — PLAN OF CARE
Alert and oriented x4 on tele monitor hr 60's sinus rhythm. Right groin dressing c/d/I. No bleeding and no hematoma. Pedal pulses present and palpable. Denies any pain. Updated w/ poc and verbalized understanding.  All needs attended and will continue to mo or patient reports new pain  - Anticipate increased pain with activity and pre-medicate as appropriate  Outcome: Progressing     Problem: RISK FOR INFECTION - ADULT  Goal: Absence of fever/infection during anticipated neutropenic period  Description: INTER Glucose maintained within prescribed range  Description: INTERVENTIONS:  - Monitor Blood Glucose as ordered  - Assess for signs and symptoms of hyperglycemia and hypoglycemia  - Administer ordered medications to maintain glucose within target range  - Asse

## 2021-12-14 NOTE — PROGRESS NOTES
[facility]     Cardiology Progress Note    Rafita Lane Patient Status:  Outpatient in a Bed    1950 MRN [id number]   [facility] 2NE-A Attending Eamon Keene MD   Hosp Day # 0 PCP LUIS ALFREDO SHAFFER       SUBJECTIVE:  No cardiac fevers, chills or night sweats. HEENT:  No visual or hearing changes. Skin:  Denies any rashes or unusual skin lesions. Respiratory:  Denies any shortness of breath, or cough.    Cardiovascular:  Denies any chest pain, dizziness, syncope or claudicati (NOVOLOG) 100 UNIT/ML flexpen 1-68 Units, 1-68 Units, Subcutaneous, TID CC  Insulin Aspart Pen (NOVOLOG) 100 UNIT/ML flexpen 1-10 Units, 1-10 Units, Subcutaneous, TID AC and HS      Assessment/Plan:  · CAD: 12/13/21: LHC: EF: 40 %, LAD: 99%, D1: 90 %, LCx:

## 2021-12-14 NOTE — PROGRESS NOTES
Met with patient in PAT to discuss pre op teaching, post op expectations, discharge planning. Discussed roles of CM/SW, PT/OT, Cardiac rehab in education and recovery.   All questions answered, binder and soap given, all pre op testing done, pts wife will

## 2021-12-14 NOTE — PLAN OF CARE
Assumed pt care at 0730. A&Ox4. RA, denies chest pain/SOB, lung sounds clear, VSS, NSR/SB on tele. Voids. Up with SBA. R groin dressing CDI, soft no hematoma.  POC post angiogram monitor jagrutiin, dc today cabg next tuesday, POC updated with pt and family, que Problem: PAIN - ADULT  Goal: Verbalizes/displays adequate comfort level or patient's stated pain goal  Description: INTERVENTIONS:  - Encourage pt to monitor pain and request assistance  - Assess pain using appropriate pain scale  - Administer analgesics appropriate  - Identify discharge learning needs (meds, wound care, etc)  - Arrange for interpreters to assist at discharge as needed  - Consider post-discharge preferences of patient/family/discharge partner  - Complete POLST form as appropriate  - Assess

## 2021-12-15 NOTE — PAT NURSING NOTE
Called patient over the phone to review pre-op surgery instructions/education for CABG scheduled for 12/21. Instructed patient TOA 0530 on 12/21, park in the [facility]/register at the [facility] on the ground floor.  NPO at 2200, take metoprolo

## 2021-12-16 ENCOUNTER — PATIENT OUTREACH (OUTPATIENT)
Dept: CASE MANAGEMENT | Age: 71
End: 2021-12-16

## 2021-12-16 NOTE — PROGRESS NOTES
1st Attempt at scheduling       Attempting to call patient to schedule DM Follow up     Unable to contact     Will try again

## 2021-12-17 NOTE — PROGRESS NOTES
2nd Attempt at scheduling       Attempting to call patient to schedule DM Follow up     Unable to contact     Will try again

## 2021-12-18 ENCOUNTER — LAB ENCOUNTER (OUTPATIENT)
Dept: LAB | Facility: HOSPITAL | Age: 71
DRG: 235 | End: 2021-12-18
Attending: THORACIC SURGERY (CARDIOTHORACIC VASCULAR SURGERY)
Payer: MEDICARE

## 2021-12-18 DIAGNOSIS — Z01.812 ENCOUNTER FOR PREOPERATIVE SCREENING LABORATORY TESTING FOR COVID-19 VIRUS: ICD-10-CM

## 2021-12-18 DIAGNOSIS — Z20.822 ENCOUNTER FOR PREOPERATIVE SCREENING LABORATORY TESTING FOR COVID-19 VIRUS: ICD-10-CM

## 2021-12-18 DIAGNOSIS — I25.10 CAD (CORONARY ARTERY DISEASE), NATIVE CORONARY ARTERY: ICD-10-CM

## 2021-12-18 DIAGNOSIS — Z91.89 AT RISK FOR BLEEDING: ICD-10-CM

## 2021-12-18 PROCEDURE — 86850 RBC ANTIBODY SCREEN: CPT

## 2021-12-18 PROCEDURE — 86900 BLOOD TYPING SEROLOGIC ABO: CPT

## 2021-12-18 PROCEDURE — 86901 BLOOD TYPING SEROLOGIC RH(D): CPT

## 2021-12-20 NOTE — PROGRESS NOTES
3rd  Attempt at scheduling     Spoke with patient advised he will be back in the Hospital tomorrow for surgery

## 2021-12-21 ENCOUNTER — APPOINTMENT (OUTPATIENT)
Dept: GENERAL RADIOLOGY | Facility: HOSPITAL | Age: 71
DRG: 235 | End: 2021-12-21
Attending: PHYSICIAN ASSISTANT
Payer: MEDICARE

## 2021-12-21 ENCOUNTER — ANESTHESIA EVENT (OUTPATIENT)
Dept: CARDIAC SURGERY | Facility: HOSPITAL | Age: 71
DRG: 235 | End: 2021-12-21
Payer: MEDICARE

## 2021-12-21 ENCOUNTER — ANESTHESIA (OUTPATIENT)
Dept: CARDIAC SURGERY | Facility: HOSPITAL | Age: 71
DRG: 235 | End: 2021-12-21
Payer: MEDICARE

## 2021-12-21 ENCOUNTER — HOSPITAL ENCOUNTER (INPATIENT)
Facility: HOSPITAL | Age: 71
LOS: 8 days | Discharge: HOME | DRG: 235 | End: 2021-12-29
Attending: THORACIC SURGERY (CARDIOTHORACIC VASCULAR SURGERY) | Admitting: THORACIC SURGERY (CARDIOTHORACIC VASCULAR SURGERY)
Payer: MEDICARE

## 2021-12-21 DIAGNOSIS — Z91.89 AT RISK FOR BLEEDING: ICD-10-CM

## 2021-12-21 DIAGNOSIS — Z20.822 ENCOUNTER FOR PREOPERATIVE SCREENING LABORATORY TESTING FOR COVID-19 VIRUS: Primary | ICD-10-CM

## 2021-12-21 DIAGNOSIS — J90 PLEURAL EFFUSION: ICD-10-CM

## 2021-12-21 DIAGNOSIS — Z01.812 ENCOUNTER FOR PREOPERATIVE SCREENING LABORATORY TESTING FOR COVID-19 VIRUS: Primary | ICD-10-CM

## 2021-12-21 DIAGNOSIS — I25.10 CAD (CORONARY ARTERY DISEASE), NATIVE CORONARY ARTERY: ICD-10-CM

## 2021-12-21 PROBLEM — Z95.1 STATUS POST CORONARY ARTERY BYPASS GRAFT: Status: ACTIVE | Noted: 2021-12-21

## 2021-12-21 PROBLEM — R94.39 ABNORMAL NUCLEAR STRESS TEST: Status: ACTIVE | Noted: 2021-12-21

## 2021-12-21 PROCEDURE — 71045 X-RAY EXAM CHEST 1 VIEW: CPT | Performed by: PHYSICIAN ASSISTANT

## 2021-12-21 PROCEDURE — 02100Z9 BYPASS CORONARY ARTERY, ONE ARTERY FROM LEFT INTERNAL MAMMARY, OPEN APPROACH: ICD-10-PCS | Performed by: THORACIC SURGERY (CARDIOTHORACIC VASCULAR SURGERY)

## 2021-12-21 PROCEDURE — 99223 1ST HOSP IP/OBS HIGH 75: CPT | Performed by: INTERNAL MEDICINE

## 2021-12-21 PROCEDURE — 76942 ECHO GUIDE FOR BIOPSY: CPT | Performed by: ANESTHESIOLOGY

## 2021-12-21 PROCEDURE — 06BP4ZZ EXCISION OF RIGHT SAPHENOUS VEIN, PERCUTANEOUS ENDOSCOPIC APPROACH: ICD-10-PCS | Performed by: THORACIC SURGERY (CARDIOTHORACIC VASCULAR SURGERY)

## 2021-12-21 PROCEDURE — 93312 ECHO TRANSESOPHAGEAL: CPT | Performed by: ANESTHESIOLOGY

## 2021-12-21 PROCEDURE — 3E033XZ INTRODUCTION OF VASOPRESSOR INTO PERIPHERAL VEIN, PERCUTANEOUS APPROACH: ICD-10-PCS | Performed by: THORACIC SURGERY (CARDIOTHORACIC VASCULAR SURGERY)

## 2021-12-21 PROCEDURE — 021109W BYPASS CORONARY ARTERY, TWO ARTERIES FROM AORTA WITH AUTOLOGOUS VENOUS TISSUE, OPEN APPROACH: ICD-10-PCS | Performed by: THORACIC SURGERY (CARDIOTHORACIC VASCULAR SURGERY)

## 2021-12-21 PROCEDURE — 5A1221Z PERFORMANCE OF CARDIAC OUTPUT, CONTINUOUS: ICD-10-PCS | Performed by: THORACIC SURGERY (CARDIOTHORACIC VASCULAR SURGERY)

## 2021-12-21 RX ORDER — DEXMEDETOMIDINE HYDROCHLORIDE 4 UG/ML
INJECTION, SOLUTION INTRAVENOUS CONTINUOUS
Status: DISCONTINUED | OUTPATIENT
Start: 2021-12-21 | End: 2021-12-25

## 2021-12-21 RX ORDER — DEXTROSE MONOHYDRATE 25 G/50ML
50 INJECTION, SOLUTION INTRAVENOUS
Status: DISCONTINUED | OUTPATIENT
Start: 2021-12-21 | End: 2021-12-29

## 2021-12-21 RX ORDER — MIDAZOLAM HYDROCHLORIDE 1 MG/ML
INJECTION INTRAMUSCULAR; INTRAVENOUS AS NEEDED
Status: DISCONTINUED | OUTPATIENT
Start: 2021-12-21 | End: 2021-12-21 | Stop reason: SURG

## 2021-12-21 RX ORDER — HEPARIN SODIUM 1000 [USP'U]/ML
INJECTION, SOLUTION INTRAVENOUS; SUBCUTANEOUS AS NEEDED
Status: DISCONTINUED | OUTPATIENT
Start: 2021-12-21 | End: 2021-12-21 | Stop reason: SURG

## 2021-12-21 RX ORDER — POTASSIUM CHLORIDE 29.8 MG/ML
40 INJECTION INTRAVENOUS AS NEEDED
Status: DISCONTINUED | OUTPATIENT
Start: 2021-12-21 | End: 2021-12-29

## 2021-12-21 RX ORDER — DEXMEDETOMIDINE HYDROCHLORIDE 4 UG/ML
INJECTION, SOLUTION INTRAVENOUS CONTINUOUS PRN
Status: DISCONTINUED | OUTPATIENT
Start: 2021-12-21 | End: 2021-12-21 | Stop reason: SURG

## 2021-12-21 RX ORDER — NITROGLYCERIN 20 MG/100ML
INJECTION INTRAVENOUS CONTINUOUS PRN
Status: DISCONTINUED | OUTPATIENT
Start: 2021-12-21 | End: 2021-12-25

## 2021-12-21 RX ORDER — IPRATROPIUM BROMIDE AND ALBUTEROL SULFATE 2.5; .5 MG/3ML; MG/3ML
3 SOLUTION RESPIRATORY (INHALATION) EVERY 4 HOURS
Status: DISCONTINUED | OUTPATIENT
Start: 2021-12-21 | End: 2021-12-21

## 2021-12-21 RX ORDER — MORPHINE SULFATE 2 MG/ML
2 INJECTION, SOLUTION INTRAMUSCULAR; INTRAVENOUS EVERY 2 HOUR PRN
Status: DISCONTINUED | OUTPATIENT
Start: 2021-12-21 | End: 2021-12-24

## 2021-12-21 RX ORDER — MIDAZOLAM HYDROCHLORIDE 1 MG/ML
1 INJECTION INTRAMUSCULAR; INTRAVENOUS EVERY 30 MIN PRN
Status: DISCONTINUED | OUTPATIENT
Start: 2021-12-21 | End: 2021-12-25

## 2021-12-21 RX ORDER — CHLORHEXIDINE GLUCONATE 0.12 MG/ML
15 RINSE ORAL
Status: DISCONTINUED | OUTPATIENT
Start: 2021-12-21 | End: 2021-12-22

## 2021-12-21 RX ORDER — DOBUTAMINE HYDROCHLORIDE 200 MG/100ML
INJECTION INTRAVENOUS CONTINUOUS PRN
Status: DISCONTINUED | OUTPATIENT
Start: 2021-12-21 | End: 2021-12-21 | Stop reason: SURG

## 2021-12-21 RX ORDER — MAGNESIUM SULFATE HEPTAHYDRATE 40 MG/ML
2 INJECTION, SOLUTION INTRAVENOUS AS NEEDED
Status: DISCONTINUED | OUTPATIENT
Start: 2021-12-21 | End: 2021-12-29

## 2021-12-21 RX ORDER — DOBUTAMINE HYDROCHLORIDE 200 MG/100ML
INJECTION INTRAVENOUS CONTINUOUS PRN
Status: DISCONTINUED | OUTPATIENT
Start: 2021-12-21 | End: 2021-12-25

## 2021-12-21 RX ORDER — LIDOCAINE HYDROCHLORIDE 10 MG/ML
INJECTION, SOLUTION EPIDURAL; INFILTRATION; INTRACAUDAL; PERINEURAL AS NEEDED
Status: DISCONTINUED | OUTPATIENT
Start: 2021-12-21 | End: 2021-12-21 | Stop reason: SURG

## 2021-12-21 RX ORDER — ONDANSETRON 2 MG/ML
4 INJECTION INTRAMUSCULAR; INTRAVENOUS EVERY 6 HOURS PRN
Status: DISCONTINUED | OUTPATIENT
Start: 2021-12-21 | End: 2021-12-29

## 2021-12-21 RX ORDER — ACETAMINOPHEN 10 MG/ML
1000 INJECTION, SOLUTION INTRAVENOUS EVERY 6 HOURS
Status: DISPENSED | OUTPATIENT
Start: 2021-12-21 | End: 2021-12-23

## 2021-12-21 RX ORDER — ALBUMIN, HUMAN INJ 5% 5 %
500 SOLUTION INTRAVENOUS ONCE
Status: COMPLETED | OUTPATIENT
Start: 2021-12-21 | End: 2021-12-21

## 2021-12-21 RX ORDER — POLYETHYLENE GLYCOL 3350 17 G/17G
17 POWDER, FOR SOLUTION ORAL DAILY PRN
Status: DISCONTINUED | OUTPATIENT
Start: 2021-12-21 | End: 2021-12-29

## 2021-12-21 RX ORDER — MAGNESIUM SULFATE 1 G/100ML
1 INJECTION INTRAVENOUS AS NEEDED
Status: DISCONTINUED | OUTPATIENT
Start: 2021-12-21 | End: 2021-12-29

## 2021-12-21 RX ORDER — DEXTROSE AND SODIUM CHLORIDE 5; .45 G/100ML; G/100ML
INJECTION, SOLUTION INTRAVENOUS CONTINUOUS
Status: DISCONTINUED | OUTPATIENT
Start: 2021-12-21 | End: 2021-12-22

## 2021-12-21 RX ORDER — HYDRALAZINE HYDROCHLORIDE 20 MG/ML
INJECTION INTRAMUSCULAR; INTRAVENOUS AS NEEDED
Status: DISCONTINUED | OUTPATIENT
Start: 2021-12-21 | End: 2021-12-21 | Stop reason: SURG

## 2021-12-21 RX ORDER — MORPHINE SULFATE 4 MG/ML
6 INJECTION, SOLUTION INTRAMUSCULAR; INTRAVENOUS EVERY 2 HOUR PRN
Status: DISCONTINUED | OUTPATIENT
Start: 2021-12-21 | End: 2021-12-24

## 2021-12-21 RX ORDER — ATORVASTATIN CALCIUM 40 MG/1
40 TABLET, FILM COATED ORAL NIGHTLY
Status: DISCONTINUED | OUTPATIENT
Start: 2021-12-21 | End: 2021-12-29

## 2021-12-21 RX ORDER — BISACODYL 10 MG
10 SUPPOSITORY, RECTAL RECTAL
Status: DISCONTINUED | OUTPATIENT
Start: 2021-12-21 | End: 2021-12-29

## 2021-12-21 RX ORDER — MELATONIN
3 NIGHTLY PRN
Status: DISCONTINUED | OUTPATIENT
Start: 2021-12-21 | End: 2021-12-24

## 2021-12-21 RX ORDER — PROTAMINE SULFATE 10 MG/ML
INJECTION, SOLUTION INTRAVENOUS AS NEEDED
Status: DISCONTINUED | OUTPATIENT
Start: 2021-12-21 | End: 2021-12-21 | Stop reason: SURG

## 2021-12-21 RX ORDER — PANTOPRAZOLE SODIUM 40 MG/1
40 TABLET, DELAYED RELEASE ORAL
Status: DISCONTINUED | OUTPATIENT
Start: 2021-12-21 | End: 2021-12-22

## 2021-12-21 RX ORDER — SODIUM CHLORIDE 9 MG/ML
INJECTION, SOLUTION INTRAVENOUS CONTINUOUS
Status: DISCONTINUED | OUTPATIENT
Start: 2021-12-21 | End: 2021-12-22

## 2021-12-21 RX ORDER — SENNOSIDES 8.6 MG
17.2 TABLET ORAL NIGHTLY PRN
Status: DISCONTINUED | OUTPATIENT
Start: 2021-12-21 | End: 2021-12-29

## 2021-12-21 RX ORDER — CEFAZOLIN SODIUM/WATER 2 G/20 ML
SYRINGE (ML) INTRAVENOUS AS NEEDED
Status: DISCONTINUED | OUTPATIENT
Start: 2021-12-21 | End: 2021-12-21 | Stop reason: SURG

## 2021-12-21 RX ORDER — CEFAZOLIN SODIUM/WATER 2 G/20 ML
2 SYRINGE (ML) INTRAVENOUS EVERY 12 HOURS
Status: COMPLETED | OUTPATIENT
Start: 2021-12-21 | End: 2021-12-23

## 2021-12-21 RX ORDER — CEFAZOLIN SODIUM/WATER 2 G/20 ML
2 SYRINGE (ML) INTRAVENOUS EVERY 8 HOURS
Status: DISCONTINUED | OUTPATIENT
Start: 2021-12-21 | End: 2021-12-21

## 2021-12-21 RX ORDER — IPRATROPIUM BROMIDE AND ALBUTEROL SULFATE 2.5; .5 MG/3ML; MG/3ML
3 SOLUTION RESPIRATORY (INHALATION) EVERY 4 HOURS PRN
Status: DISCONTINUED | OUTPATIENT
Start: 2021-12-21 | End: 2021-12-29

## 2021-12-21 RX ORDER — ASPIRIN 81 MG/1
81 TABLET ORAL DAILY
Status: DISCONTINUED | OUTPATIENT
Start: 2021-12-21 | End: 2021-12-29

## 2021-12-21 RX ORDER — POTASSIUM CHLORIDE 14.9 MG/ML
20 INJECTION INTRAVENOUS AS NEEDED
Status: DISCONTINUED | OUTPATIENT
Start: 2021-12-21 | End: 2021-12-29

## 2021-12-21 RX ORDER — MORPHINE SULFATE 4 MG/ML
4 INJECTION, SOLUTION INTRAMUSCULAR; INTRAVENOUS EVERY 2 HOUR PRN
Status: DISCONTINUED | OUTPATIENT
Start: 2021-12-21 | End: 2021-12-24

## 2021-12-21 RX ORDER — ROCURONIUM BROMIDE 10 MG/ML
INJECTION, SOLUTION INTRAVENOUS AS NEEDED
Status: DISCONTINUED | OUTPATIENT
Start: 2021-12-21 | End: 2021-12-21 | Stop reason: SURG

## 2021-12-21 RX ORDER — VANCOMYCIN HYDROCHLORIDE 1 G/20ML
INJECTION, POWDER, LYOPHILIZED, FOR SOLUTION INTRAVENOUS AS NEEDED
Status: DISCONTINUED | OUTPATIENT
Start: 2021-12-21 | End: 2021-12-21 | Stop reason: SURG

## 2021-12-21 RX ORDER — SODIUM PHOSPHATE, DIBASIC AND SODIUM PHOSPHATE, MONOBASIC 7; 19 G/133ML; G/133ML
1 ENEMA RECTAL ONCE AS NEEDED
Status: DISCONTINUED | OUTPATIENT
Start: 2021-12-21 | End: 2021-12-29

## 2021-12-21 RX ORDER — ALBUMIN, HUMAN INJ 5% 5 %
250 SOLUTION INTRAVENOUS ONCE AS NEEDED
Status: COMPLETED | OUTPATIENT
Start: 2021-12-21 | End: 2021-12-22

## 2021-12-21 RX ORDER — CLOPIDOGREL BISULFATE 75 MG/1
75 TABLET ORAL DAILY
COMMUNITY
End: 2022-01-10

## 2021-12-21 RX ADMIN — LIDOCAINE HYDROCHLORIDE 50 MG: 10 INJECTION, SOLUTION EPIDURAL; INFILTRATION; INTRACAUDAL; PERINEURAL at 06:39:00

## 2021-12-21 RX ADMIN — VANCOMYCIN HYDROCHLORIDE 1000 MG: 1 INJECTION, POWDER, LYOPHILIZED, FOR SOLUTION INTRAVENOUS at 07:26:00

## 2021-12-21 RX ADMIN — CEFAZOLIN SODIUM/WATER 2 G: 2 G/20 ML SYRINGE (ML) INTRAVENOUS at 11:00:00

## 2021-12-21 RX ADMIN — DOBUTAMINE HYDROCHLORIDE 5 MCG/KG/MIN: 200 INJECTION INTRAVENOUS at 10:13:00

## 2021-12-21 RX ADMIN — ROCURONIUM BROMIDE 100 MG: 10 INJECTION, SOLUTION INTRAVENOUS at 06:39:00

## 2021-12-21 RX ADMIN — HYDRALAZINE HYDROCHLORIDE 10 MG: 20 INJECTION INTRAMUSCULAR; INTRAVENOUS at 10:24:00

## 2021-12-21 RX ADMIN — HEPARIN SODIUM 5000 UNITS: 1000 INJECTION, SOLUTION INTRAVENOUS; SUBCUTANEOUS at 08:23:00

## 2021-12-21 RX ADMIN — HEPARIN SODIUM 17000 UNITS: 1000 INJECTION, SOLUTION INTRAVENOUS; SUBCUTANEOUS at 08:35:00

## 2021-12-21 RX ADMIN — MIDAZOLAM HYDROCHLORIDE 5 MG: 1 INJECTION INTRAMUSCULAR; INTRAVENOUS at 09:53:00

## 2021-12-21 RX ADMIN — ROCURONIUM BROMIDE 100 MG: 10 INJECTION, SOLUTION INTRAVENOUS at 09:02:00

## 2021-12-21 RX ADMIN — DEXMEDETOMIDINE HYDROCHLORIDE 0.5 MCG/KG/HR: 4 INJECTION, SOLUTION INTRAVENOUS at 07:22:00

## 2021-12-21 RX ADMIN — CEFAZOLIN SODIUM/WATER 2 G: 2 G/20 ML SYRINGE (ML) INTRAVENOUS at 07:13:00

## 2021-12-21 RX ADMIN — HYDRALAZINE HYDROCHLORIDE 10 MG: 20 INJECTION INTRAMUSCULAR; INTRAVENOUS at 10:30:00

## 2021-12-21 RX ADMIN — PROTAMINE SULFATE 250 MG: 10 INJECTION, SOLUTION INTRAVENOUS at 10:21:00

## 2021-12-21 RX ADMIN — MIDAZOLAM HYDROCHLORIDE 5 MG: 1 INJECTION INTRAMUSCULAR; INTRAVENOUS at 06:33:00

## 2021-12-21 NOTE — CONSULTS
Pharmacy consulted to dose post-op antibiotics. SCr 1.83 mg/dL; estimated CrCl 34.8 mL/min. Ancef adjusted to 2 grams IV every 12 hours x4 doses. Ok for vancomycin 1 gram IV every 12 hours x2 doses as ordered.      Adeola Rodriguez, PharmD  12/21/21 12:48 PM

## 2021-12-21 NOTE — CONSULTS
[facility]  CONSULT     Clif Velazquez Patient Status:  Inpatient    1950 MRN [id number]   [facility] 6NE-A Attending Huma Headley MD   Hosp Day # 0  [facility]     Reason for consult: Medical management    Requested by: (70-30) 100 UNIT/ML Subcutaneous Suspension, Inject into the skin 3 (three) times daily as needed. , Disp: , Rfl:   clopidogrel 75 MG Oral Tab, Take 75 mg by mouth daily. , Disp: , Rfl:     Review of Systems:   Unable to complete a comprehensive 14 point r systolic CHF with EF 39%  3. Hypertension  1. Currently requiring pressor support  2. Resume home metoprolol once able  4. HLD  1. Statin  5. PVD s/p femoral to tibial bypass  1. ASA, statin  6. CKD stage III  1. Monitor Cr closely  7. DM type II  1.  Insul

## 2021-12-21 NOTE — ANESTHESIA POSTPROCEDURE EVALUATION
[facility]    Elo Hardeep Patient Status:  Inpatient   Age/Gender 66year old male MRN [id number]   [facility] 6NE-A Attending Jack Mendez MD   Hosp Day # 0 PCP LUIS ALFREDO SHAFFER       Anesthesia Post-op Note    CORONARY ARTERY BYPASS GRAFT relinquished care of the patient. Dental Exam: Unchanged from Preop    Patient to be transferred to ICU.

## 2021-12-21 NOTE — ANESTHESIA PROCEDURE NOTES
Central Line  Performed by: Alex Peace MD  Authorized by: Alex Peace MD     General Information and Staff    Procedure Start:   Anesthesiologist: Alex Peace MD  Performed by:   Anesthesiologist  Patient Location:  OR  Indication: central veno

## 2021-12-21 NOTE — ANESTHESIA PROCEDURE NOTES
Arterial Line  Performed by: Shana Moyer MD  Authorized by: Shana Moyer MD     General Information and Staff    Procedure Start:   Anesthesiologist: Shana Moyer MD  Performed By:  Anesthesiologist  Patient Location:  OR  Indication: continuou

## 2021-12-21 NOTE — ANESTHESIA PROCEDURE NOTES
Procedure Performed: GHAZAL    Start Time:        End Time:      Preanesthesia Checklist:  Patient identified, IV assessed, risks and benefits discussed, monitors and equipment assessed, procedure being performed at surgeon's request and anesthesia consent ob normal  Pleural Effusion:  none  Pulmonary Arteries:  normal  Pulmonary Venous Flow:  normal    Anesthesia Information  Performed Personally  Anesthesiologist:  Dalton Davis MD      Post    Ventricular FXN:  Global FXN: Improved   Regional FXN: Improved

## 2021-12-21 NOTE — DIETARY NOTE
Pt chart reviewed d/t consult received per cardiac rehab standing order. Pt to be educated by cardiac rehab staff and encouraged to attend outpatient classes taught by RAHUL. RD available PRN.

## 2021-12-21 NOTE — ANESTHESIA PROCEDURE NOTES
Airway  Date/Time: 12/21/2021 6:41 AM  Urgency: elective      General Information and Staff    Patient location during procedure: OR  Anesthesiologist: Shana Moyer MD  Performed: anesthesiologist     Indications and Patient Condition  Indications for a

## 2021-12-21 NOTE — CONSULTS
[facility] Cardiology  Consult    Murdosunil Saldivar Patient Status:  Inpatient    1950 MRN [id number]   [facility] 6NE-A Attending Piero Lira MD   Hosp Day # 0 PCP LUIS ALFREDO Milner     Subjective:  See EMR for details.   65 yo with severe MV CAD and disease involving native coronary artery of native heart    Abnormal nuclear stress test      Early POD #0. Requiring pressor support. Give IV albumin to increase central filling pressures. Wean off epi. Rx levophed as needed.   Can wean down dobs as to

## 2021-12-21 NOTE — ANESTHESIA PREPROCEDURE EVALUATION
PRE-OP EVALUATION    Patient Name: Rose Brown    Admit Diagnosis: CORONARY ARTERY DISEASE    Pre-op Diagnosis: CORONARY ARTERY DISEASE    CORONARY ARTERY BYPASS GRAFT, RIGHT SAPHENOUS VEIN    Anesthesia Procedure: CORONARY ARTERY BYPASS GRAFT, RIGHT SAP hypertension and well controlled    (+) CAD                (+) CHF                Endo/Other      (+) diabetes  type 2,                          Pulmonary    Negative pulmonary ROS. Neuro/Psych    Negative neuro/psych ROS. regurgitation. Pulmonic valve:    Structurally normal valve.    Doppler:  Transvalvular   velocity was within the normal range. There was trivial regurgitation. Pericardium: Kartik Donning was no pericardial effusion.    Pleura:  There were bilateral pleural ef (L) 12/13/2021    MCV 86.7 12/13/2021    MCH 26.9 12/13/2021    MCHC 31.1 12/13/2021    RDW 13.9 12/13/2021    .0 (L) 12/13/2021     Lab Results   Component Value Date     12/13/2021    K 4.3 12/13/2021     12/13/2021    CO2 27.0 12/13/2

## 2021-12-22 ENCOUNTER — APPOINTMENT (OUTPATIENT)
Dept: GENERAL RADIOLOGY | Facility: HOSPITAL | Age: 71
DRG: 235 | End: 2021-12-22
Attending: PHYSICIAN ASSISTANT
Payer: MEDICARE

## 2021-12-22 PROCEDURE — 71045 X-RAY EXAM CHEST 1 VIEW: CPT | Performed by: PHYSICIAN ASSISTANT

## 2021-12-22 PROCEDURE — 99232 SBSQ HOSP IP/OBS MODERATE 35: CPT | Performed by: INTERNAL MEDICINE

## 2021-12-22 PROCEDURE — 99223 1ST HOSP IP/OBS HIGH 75: CPT | Performed by: INTERNAL MEDICINE

## 2021-12-22 RX ORDER — SODIUM CHLORIDE, SODIUM LACTATE, POTASSIUM CHLORIDE, CALCIUM CHLORIDE 600; 310; 30; 20 MG/100ML; MG/100ML; MG/100ML; MG/100ML
INJECTION, SOLUTION INTRAVENOUS CONTINUOUS
Status: DISCONTINUED | OUTPATIENT
Start: 2021-12-22 | End: 2021-12-24

## 2021-12-22 RX ORDER — ALBUMIN, HUMAN INJ 5% 5 %
250 SOLUTION INTRAVENOUS ONCE
Status: COMPLETED | OUTPATIENT
Start: 2021-12-22 | End: 2021-12-22

## 2021-12-22 RX ORDER — ALBUMIN, HUMAN INJ 5% 5 %
SOLUTION INTRAVENOUS
Status: COMPLETED
Start: 2021-12-22 | End: 2021-12-22

## 2021-12-22 RX ORDER — PANTOPRAZOLE SODIUM 40 MG/1
40 TABLET, DELAYED RELEASE ORAL
Status: DISCONTINUED | OUTPATIENT
Start: 2021-12-23 | End: 2021-12-29

## 2021-12-22 NOTE — PROGRESS NOTES
[facility]  Progress Note     Tommy Marizoltoshia Patient Status:  Inpatient    1950 MRN [id number]   [facility] 6NE-A Attending Gloria Castro MD   Hosp Day # 1 [facility]     Chief Complaint: Incisional chest pain    S: Patient results for input(s): TROP, CK in the last 168 hours. Imaging: Imaging data reviewed in Epic.     Medications:   • aspirin  81 mg Oral Daily   • atorvastatin  40 mg Oral Nightly   • vancomycin  15 mg/kg (Adjusted) Intravenous Q12H   • metoprolol tartrat

## 2021-12-22 NOTE — PROGRESS NOTES
Received care of pt at 1930  Awake and alert, SOARES  SBP labile, titrating Epi to maintain SBP >90   Insulin gtt infusing    Around 0445 patient HR dropped to 40's and SBP to the 50's with paced rhythm   HR and BP continue to be labile, 250 albumin given

## 2021-12-22 NOTE — OPERATIVE REPORT
[facility]    PATIENT'S NAME: Lillian Halsted   ATTENDING PHYSICIAN: Lokesh Harrison MD   OPERATING PHYSICIAN: Lokesh Harrison MD   PATIENT ACCOUNT#:   [id number]    LOCATION:  [address] [place] [address]  MEDICAL RECORD #:   [id number]       YOB: 1950  ADM I dissected out the right posterior descending and was able to open. This was a 1 mm artery. A segment of saphenous graft was placed here.   Cardioplegia was given, following which the most distal circumflex branch, which I really would describe as a codo

## 2021-12-22 NOTE — PROGRESS NOTES
[facility]  Progress Note    Lisandro Simpson Patient Status:  Inpatient    1950 MRN [id number]   [facility] 6NE-A Attending Latosha Ahn MD   Hosp Day # 1 PCP LUIS ALFREDO SHAFFER     Subjective:  Seen and examined by Dr. Unruly Fuchs.  Labile BPs an Jett Sotelo  12/22/2021  12:53 PM

## 2021-12-22 NOTE — PROGRESS NOTES
Subjective: In bed on exam, episodes of tachy/anthony associated with hypotension. Vpaced backup at 60, asympt. Remains on low dose Epi. Weak, but yanni CP, SOB, or dizziness.  Febrile overnight 101.4 -afebrile this am, but WBC 19.    Objective:   BP reversible restrictive pattern, indicative of decreased left ventricular diastolic compliance and/or increased left atrial pressure - grade 3 diastolic dysfunction. Doppler parameters are consistent with elevated mean left atrial filling pressure.  Acoustic into the skin 3 (three) times daily as needed. , Disp: , Rfl:   clopidogrel 75 MG Oral Tab, Take 75 mg by mouth daily. , Disp: , Rfl:       Assessment:  · POD 1 s/p CABG x3 (LIMA–LAD, SVG–OM, SVG–right PDA) D/T MV CAD -on low-dose epi  · HTN -currently hyp Electrophysiolgy

## 2021-12-22 NOTE — PLAN OF CARE
Assumed care at 0700. A&O x 4. On 2L tolerating well. Patient goes back and forth between needing pacing and having his own rhythm. EP at bedside to evaluate his rhythm. Atrial wires are not capturing per Dr. Ileana Edmonds.  Epi turned off to help with his rhythm

## 2021-12-22 NOTE — CONSULTS
[facility]  Report of Consultation    Breeshorty Carbajaljinny Patient Status:  Inpatient    1950 MRN [id number]   [facility] 6NE-A Attending Ruddy Jhaveri MD   Hosp Day # 1 PCP LUIS ALFREDO SHAFFER       Assessment / Plan:    1) AMADO- nonoliguric ATN Date   • FEM/POPL REVASC W/STENT  02/2021     No family history on file. Denies family history of kidney disease. reports that he has never smoked. He has never used smokeless tobacco. He reports that he does not drink alcohol and does not use drugs. IVPB premix 1 g, 1 g, Intravenous, PRN  •  Magnesium Sulfate IVPB premix SOLN 2 g, 2 g, Intravenous, PRN  •  mupirocin (BACTROBAN) 2% nasal ointment OINT 1 Application, 1 Application, Nasal, BID  •  dextrose 5 %-0.45 % NaCl infusion,  mL/hr, Intraven Intake/Output Summary (Last 24 hours) at 12/22/2021 1100  Last data filed at 12/22/2021 1000  Gross per 24 hour   Intake 3525.98 ml   Output 2875 ml   Net 650.98 ml     Wt Readings from Last 3 Encounters:  12/21/21 : 159 lb 12.8 oz (72.5 kg)  12/09/21

## 2021-12-22 NOTE — PROGRESS NOTES
Pt rec'd from [facility], intubated with usual lines. On Epi,,Precedex and Insulin gtt. Pt extubated @ 1740. Tolerated well. Family at bedside updated on pt's condition.

## 2021-12-22 NOTE — PHYSICAL THERAPY NOTE
Physical Therapy    Orders for PT eval received. Pt's blood pressure and heart rate remain labile at this time. Will hold PT eval until more medically stable to participate.

## 2021-12-23 ENCOUNTER — APPOINTMENT (OUTPATIENT)
Dept: GENERAL RADIOLOGY | Facility: HOSPITAL | Age: 71
DRG: 235 | End: 2021-12-23
Attending: THORACIC SURGERY (CARDIOTHORACIC VASCULAR SURGERY)
Payer: MEDICARE

## 2021-12-23 PROCEDURE — 99291 CRITICAL CARE FIRST HOUR: CPT | Performed by: INTERNAL MEDICINE

## 2021-12-23 PROCEDURE — 71045 X-RAY EXAM CHEST 1 VIEW: CPT | Performed by: THORACIC SURGERY (CARDIOTHORACIC VASCULAR SURGERY)

## 2021-12-23 PROCEDURE — 99232 SBSQ HOSP IP/OBS MODERATE 35: CPT | Performed by: INTERNAL MEDICINE

## 2021-12-23 RX ORDER — DEXTROSE MONOHYDRATE 25 G/50ML
50 INJECTION, SOLUTION INTRAVENOUS AS NEEDED
Status: DISCONTINUED | OUTPATIENT
Start: 2021-12-23 | End: 2021-12-29

## 2021-12-23 NOTE — PROGRESS NOTES
[facility]  Progress Note    Domenic Palma Patient Status:  Inpatient    1950 MRN [id number]   [facility] 6NE-A Attending Trevin Villanueva MD   Hosp Day # 2 PCP LUIS ALFREDO SHAFFER     Subjective:  Seen and examined by Dr. Jose F Yang.  Feeling salome - mild TCP w/o evidence of bleeding - likely consumptive - follow CBC                - PW are IN                - PPX: CECE/SCD/PPI                - Pain control/IS/ambulation                - CCU monitoring today      Jett Pedroza  12/

## 2021-12-23 NOTE — PROGRESS NOTES
[facility]  Progress Note     Domenic Palma Patient Status:  Inpatient    1950 MRN [id number]   [facility] 6NE-A Attending Trevin Villanueva MD   Hosp Day # 2 [facility] 600 [facility]     Chief Complaint: Incisional chest pain    S: Patient --     < > = values in this interval not displayed. Estimated Creatinine Clearance: 22.7 mL/min (A) (based on SCr of 2.81 mg/dL (H)).     Recent Labs   Lab 12/21/21  1141   PTP 17.3*   INR 1.41*     No results for input(s): TROP, CK in the last 168 hour expected to be discharge to: Home    Plan of care discussed with patient, RN.     Diya Kendall, DO

## 2021-12-23 NOTE — PROGRESS NOTES
[facility] Cardiology  Progress Note    Sindhu Maldonado Patient Status:  Inpatient    1950 MRN [id number]   [facility] 6NE-A Attending Iman Monreal MD   Hosp Day # 2 PCP LUIS ALFREDO SHAFFER       Assessment and Plan:    1.  CV - S/P cabg, POD#2, modera 12/23/21  0407   RBC 3.57* 3.06* 3.22*   HGB 9.7* 8.6* 8.8*   HCT 30.8* 27.0* 28.7*   MCV 86.3 88.2 89.1   MCH 27.2 28.1 27.3   MCHC 31.5 31.9 30.7*   RDW 13.3 14.1 14.6   NEPRELIM  --  17.24* 18.94*   WBC 17.3* 19.5* 22.1*   .0* 144.0* 134.0*     R

## 2021-12-23 NOTE — PROGRESS NOTES
Received care of pt at [address] to be pacing most of the night  Levo gtt to maintain sbp >90     Potassium 6.5- see orders from nephrology

## 2021-12-23 NOTE — PLAN OF CARE
Nausea this am . Levophed back on pacing this am rate of 70 pili with blood pressures low when patient paced.  here to see changed pacing settings rate now 50 NSR 60- 70's Blood pressure much better post changing rate.  Per Dr. Joel Ferrera remove pili us

## 2021-12-23 NOTE — PROGRESS NOTES
[facility]  Nephrology Progress Note    Alethea Lucero Attending:  Yulia Dumont MD       Assessment and Plan:    1) AMADO- nonoliguric ATN as expected in setting of CABG with underlying diabetic nephropathy; no other clear insults. UA bland.  PLAN- belgica 22.1 12/23/2021    HGB 8.8 12/23/2021    HCT 28.7 12/23/2021    .0 12/23/2021    CREATSERUM 2.81 12/23/2021    BUN 34 12/23/2021     12/23/2021    K 6.5 12/23/2021     12/23/2021    CO2 20.0 12/23/2021     12/23/2021    CA 8.2 12/ Intravenous, Continuous PRN  metoprolol tartrate (LOPRESSOR) tab 25 mg, 25 mg, Oral, 2x Daily(Beta Blocker)  potassium chloride IVPB premix 20 mEq, 20 mEq, Intravenous, PRN   Or  potassium chloride IVPB premix 40 mEq, 40 mEq, Intravenous, PRN  calcium gluc

## 2021-12-23 NOTE — PHYSICAL THERAPY NOTE
Physical Therapy    Re-attempted PT eval today, but pt cont to have heart rate issues and requires ongoing pressor support. Discussed w/ rn Redd Garnett. Will hold today and re-attempt tomorrow.

## 2021-12-23 NOTE — OCCUPATIONAL THERAPY NOTE
Occupational Therapy     Re-attempted OT eval today, but pt cont to have heart rate issues and requires ongoing pressor support. Discussed w/ rn Jarek Rocha. Will hold today and re-attempt at later date.

## 2021-12-24 ENCOUNTER — APPOINTMENT (OUTPATIENT)
Dept: GENERAL RADIOLOGY | Facility: HOSPITAL | Age: 71
DRG: 235 | End: 2021-12-24
Attending: INTERNAL MEDICINE
Payer: MEDICARE

## 2021-12-24 PROCEDURE — 74018 RADEX ABDOMEN 1 VIEW: CPT | Performed by: INTERNAL MEDICINE

## 2021-12-24 PROCEDURE — 99233 SBSQ HOSP IP/OBS HIGH 50: CPT | Performed by: INTERNAL MEDICINE

## 2021-12-24 PROCEDURE — 99232 SBSQ HOSP IP/OBS MODERATE 35: CPT | Performed by: INTERNAL MEDICINE

## 2021-12-24 RX ORDER — DEXTROSE AND SODIUM CHLORIDE 5; .45 G/100ML; G/100ML
INJECTION, SOLUTION INTRAVENOUS CONTINUOUS
Status: DISCONTINUED | OUTPATIENT
Start: 2021-12-24 | End: 2021-12-29

## 2021-12-24 RX ORDER — SCOLOPAMINE TRANSDERMAL SYSTEM 1 MG/1
1 PATCH, EXTENDED RELEASE TRANSDERMAL
Status: DISCONTINUED | OUTPATIENT
Start: 2021-12-24 | End: 2021-12-29

## 2021-12-24 RX ORDER — MELATONIN
3 NIGHTLY PRN
Status: DISCONTINUED | OUTPATIENT
Start: 2021-12-24 | End: 2021-12-29

## 2021-12-24 RX ORDER — ACETAMINOPHEN 500 MG
1000 TABLET ORAL EVERY 6 HOURS PRN
Status: DISCONTINUED | OUTPATIENT
Start: 2021-12-24 | End: 2021-12-29

## 2021-12-24 RX ORDER — MELATONIN
3 NIGHTLY
Status: DISCONTINUED | OUTPATIENT
Start: 2021-12-24 | End: 2021-12-24

## 2021-12-24 RX ORDER — LORAZEPAM 2 MG/ML
0.25 INJECTION INTRAMUSCULAR ONCE
Status: DISCONTINUED | OUTPATIENT
Start: 2021-12-24 | End: 2021-12-24

## 2021-12-24 NOTE — OCCUPATIONAL THERAPY NOTE
OCCUPATIONAL THERAPY EVALUATION - INPATIENT     Room Number: 2673/9544-L  Evaluation Date: 12/24/2021  Type of Evaluation: Initial  Presenting Problem: s/p CABG    Physician Order: IP Consult to Occupational Therapy  Reason for Therapy: ADL/IADL Dysfunctio Stand: Minimum assistance  Sit to supine: nt  Chair transfer: min A  Toilet/commode transfer: min A  Ambulation min A and use of RW    Activity tolerance: tolerates > 5 min in standing w/ min c/o fatigue.   o2 sats 90's while on 2lo2nc at rest and during ac Sternal;Cardiac  Fall Risk: High fall risk    COGNITION  Overall Cognitive Status:  WFL - within functional limits    ASSESSMENTS    Upper Extremity:   ROM: within functional limits   Strength: is within functional limits   Coordination:  Gross motor: [facility] Patient/family PPE: Mask worn

## 2021-12-24 NOTE — HOME CARE LIAISON
Received referral via [employer] for Home Health services. Spoke w/ patient and wife provided with list of [facility] providers from [address], patient choice is [address].  Agency reserved in [address] [place] [address] Road and contact information placed on AVS.Financial interest dis

## 2021-12-24 NOTE — H&P
Mr. Dominick Farmer is a very pleasant 24-year-old gentleman with a 24 year history of diabetes. He is insulin dependent. He recently underwent left fem-pop bypass with Doctor Ginger Hong for limb salvage.   The unhealing wound on the foot where he had a fifth toe amput superficial trophic changes on both lower extremities. He has the previously mentioned fifth toe ray amputation healed on the left leg.       We have discussed options for treatment including medical therapy, multivessel angioplasty, or multivessel coronar

## 2021-12-24 NOTE — PHYSICAL THERAPY NOTE
PHYSICAL THERAPY EVALUATION - INPATIENT     Room Number: 9091/9498-U  Evaluation Date: 12/24/2021  Type of Evaluation: Initial  Physician Order: PT Eval and Treat    Presenting Problem: S/p CABG 12/21  Co-Morbidities : DM, HTN, CKD, PVD s/p revascula supine - sit EOB @ level: modified independent     Goal #2 Patient is able to demonstrate transfers EOB to/from [facility] at assistance level: modified independent     Goal #3 Patient is able to ambulate 300 feet with assist device: none at assistance level: sup INPATIENT SHORT FORM - BASIC MOBILITY  How much difficulty does the patient currently have. ..   Patient Difficulty: Turning over in bed (including adjusting bedclothes, sheets and blankets)?: None   Patient Difficulty: Sitting down on and standing up from a questions and concerns addressed; Family present (Nick Irene aware of eval session)    Therapist PPE: Mask, gloves and goggles were worn. Patient/Family PPE: Mask worn in hallway. Family in mask during session.

## 2021-12-24 NOTE — CM/SW NOTE
12/24/21 1100   CM/SW Referral Data   Referral Source    Reason for Referral Discharge planning   Informant Patient;Spouse/Significant Other; Son   Patient Info   Patient's Current Mental Status at Time of Assessment Alert;Oriented   Patient'

## 2021-12-24 NOTE — PLAN OF CARE
Assumed pt care at 0730. Pt a/o x3 and sitting up in the chair. VSS. NSR. Occasionally V-paced. HR 50-70s. Ventricular wires on and connected to temp pacer. VVI 45/25. Midsternal and R leg incision steri-strips kya. Site care done. Pt denies pain.  Pt nause

## 2021-12-24 NOTE — PROGRESS NOTES
[facility] Cardiology  Progress Note    Sindhu Maldonado Patient Status:  Inpatient    1950 MRN [id number]   [facility] 6NE-A Attending Iman Monreal MD   Hosp Day # 3 PCP LUIS ALFREDO SHAFFER       Assessment and Plan:    1.  CV -status post coronary byp and oriented, normal affect. Skin: Warm and dry. Laboratory/Data:    Labs:  No results for input(s): TROP, CK in the last 168 hours.    Recent Labs   Lab 12/22/21  0425 12/23/21  0407 12/24/21  0401   RBC 3.06* 3.22* 2.77*   HGB 8.6* 8.8* 7.7*   HCT 27

## 2021-12-24 NOTE — PROGRESS NOTES
[facility]  Progress Note     Tommy Marizoltoshia Patient Status:  Inpatient    1950 MRN [id number]   [facility] 6NE-A Attending Gloria Castro MD   Hosp Day # 3 [facility] 600 [facility]     Chief Complaint: Incisional chest pain    S: Patient (based on SCr of 3.36 mg/dL (H)). Recent Labs   Lab 12/21/21  1141   PTP 17.3*   INR 1.41*     No results for input(s): TROP, CK in the last 168 hours. Imaging: Imaging data reviewed in Epic.     Medications:   • epoetin janet-epbx  10,000 Units Subcu family, RN.     Kaylyn Grover, DO

## 2021-12-24 NOTE — PROGRESS NOTES
[facility]  Nephrology Progress Note    Domenic Palma Attending:  Trevin Villanueva MD       Assessment and Plan:    1) AMADO- nonoliguric ATN as expected in setting of CABG with underlying diabetic nephropathy. Reasonable UO; lytes / acid-base / volume OK. Cranial nerves grossly intact, moving all extremities  Skin: Warm and dry, no rashes       Labs:   Lab Results   Component Value Date    WBC 16.5 12/24/2021    HGB 7.7 12/24/2021    HCT 24.8 12/24/2021    .0 12/24/2021    CREATSERUM 3.36 12/24/2021 Intravenous, Continuous PRN  Nitroprusside Sodium (NIPRIDE) 50 mg in dextrose 5 % 250 mL infusion, 0.1-4 mcg/kg/min (Dosing Weight), Intravenous, Continuous PRN  metoprolol tartrate (LOPRESSOR) tab 25 mg, 25 mg, Oral, 2x Daily(Beta Blocker)  potassium chlo

## 2021-12-24 NOTE — PROGRESS NOTES
Assumed care of pt. At [address]. Pt. Resting in bed. Son at bedside. A & O x4. VSS. SOARES w/ some generalized weakness. Denies of any pain. Pt. C/o nausea when he coughs, relieved w/ antiemetics. Temp pacemaker on at the bedside. Cole Cheek.  Up to chair this

## 2021-12-24 NOTE — PROGRESS NOTES
[facility]   [facility] Progress Note    Vivi Mack Patient Status:  Inpatient    1950 MRN [id number]   [facility] 6NE-A Attending Stacy Santana MD   Hosp Day # 3 PCP LUIS ALFREDO SHAFFER     Subjective:  Sitting up in chair.  Having coughing in D5W (DOBUTREX) 500 mg/250 ml infusion, 2.5-20 mcg/kg/min (Dosing Weight), Intravenous, Continuous PRN  nitroGLYCERIN infusion 50mg in D5W 250ml, 5-300 mcg/min, Intravenous, Continuous PRN  norepinephrine (LEVOPHED) 4 mg/250 ml premix infusion, 0.5-30 mc CO2 24.0 12/24/2021     12/24/2021    CA 8.3 12/24/2021       Chest Xray: none today      Physical Exam:    Neuro: alert and oriented. Intact   Lungs: CTA, diminished.   Heart: s1s2 RRR, NSR/ SB on monitor with occ paced beats.   Sternum sta

## 2021-12-25 PROCEDURE — 99232 SBSQ HOSP IP/OBS MODERATE 35: CPT | Performed by: INTERNAL MEDICINE

## 2021-12-25 PROCEDURE — 99233 SBSQ HOSP IP/OBS HIGH 50: CPT | Performed by: INTERNAL MEDICINE

## 2021-12-25 RX ORDER — FUROSEMIDE 10 MG/ML
20 INJECTION INTRAMUSCULAR; INTRAVENOUS ONCE
Status: COMPLETED | OUTPATIENT
Start: 2021-12-25 | End: 2021-12-25

## 2021-12-25 RX ORDER — HYDRALAZINE HYDROCHLORIDE 20 MG/ML
10 INJECTION INTRAMUSCULAR; INTRAVENOUS EVERY 4 HOURS PRN
Status: DISCONTINUED | OUTPATIENT
Start: 2021-12-25 | End: 2021-12-29

## 2021-12-25 RX ORDER — AMLODIPINE BESYLATE 2.5 MG/1
2.5 TABLET ORAL DAILY
Status: DISCONTINUED | OUTPATIENT
Start: 2021-12-25 | End: 2021-12-26

## 2021-12-25 NOTE — PROGRESS NOTES
[facility]  Nephrology Progress Note    Lea Warner Attending:  Dimple Washburn MD       Subjective:  No complains    insulin detemir (LEVEMIR) 100 UNIT/ML flextouch 12 Units, 12 Units, Subcutaneous, Daily  epoetin janet-epbx (RETACRIT) 47545 UNIT/ML i Intravenous, Q15 Min PRN   Or  glucose (DEX4) oral liquid 30 g, 30 g, Oral, Q15 Min PRN   Or  glucose-vitamin C (DEX-4) chewable tab 8 tablet, 8 tablet, Oral, Q15 Min PRN  ipratropium-albuterol (DUONEB) nebulizer solution 3 mL, 3 mL, Nebulization, Q4H PRN LIPASE, LDH in the last 72 hours. Invalid input(s): ALPHOS, TBIL, DBIL, TPROT    Assessment and Plan:    1. AMADO/CKD baseline Cr ~ 1.5 related to DM; AMADO- nonoliguric ATN as expected in setting of CABG with underlying diabetic nephropathy.   Hemodynamical

## 2021-12-25 NOTE — PROGRESS NOTES
[facility]   [facility] Progress Note    Lea Warner Patient Status:  Inpatient    1950 MRN [id number]   [facility] 6NE-A Attending Dimple Washburn MD   Hosp Day # 4 PCP LUIS ALFREDO SHAFFER     Subjective:  Denies pain. No nausea now.  Taking s PRN  bisacodyl (DULCOLAX) rectal suppository 10 mg, 10 mg, Rectal, Daily PRN  Fleet Enema (FLEET) 7-19 GM/118ML enema 133 mL, 1 enema, Rectal, Once PRN  ondansetron (ZOFRAN) injection 4 mg, 4 mg, Intravenous, Q6H PRN  metoprolol tartrate (LOPRESSOR) tab 25 ([facility] [address]     Insomnia     Thrombophlebitis     Primary hypertension     Anemia     AMADO (acute kidney injury) ([facility] [address]     Osteitis of ankle and foot (HCC)     Abscess of left foot     Scrotal edema     Hypoalbuminemia     Urinary obstruction     Hypokalemia

## 2021-12-25 NOTE — PLAN OF CARE
Maykel Nicholson describes getting some sleep overnight. Still with periodic nausea and retching without emesis, but nausea is not constant. No paced beats noted on tele. Wife at bedside, questions answered.

## 2021-12-25 NOTE — PROGRESS NOTES
[facility] Cardiology  Progress Note    Julia Little Patient Status:  Inpatient    1950 MRN [id number]   [facility] 6NE-A Attending Phil Aquino MD   Hosp Day # 4 PCP LUIS ALFREDO SHAFFER       Assessment and Plan:    1.  CV -status post coronary byp mildly distended  Extremities: Without clubbing, cyanosis or edema. Neurologic: Alert and oriented, normal affect. Skin: Warm and dry. Laboratory/Data:    Labs:  No results for input(s): TROP, CK in the last 168 hours.    Recent Labs   Lab 12/22/21  0

## 2021-12-25 NOTE — PROGRESS NOTES
[facility]  Progress Note     Tirso Casillas Patient Status:  Inpatient    1950 MRN [id number]   [facility] 6NE-A Attending Tona Pizano MD   Hosp Day # 4 [facility] 600 [facility]     Chief Complaint: Incisional chest pain    S: Patient mL/min (A) (based on SCr of 2.71 mg/dL (H)). Recent Labs   Lab 12/21/21  1141   PTP 17.3*   INR 1.41*     No results for input(s): TROP, CK in the last 168 hours. Imaging: Imaging data reviewed in Epic.     Medications:   • insulin detemir  12 Units the patient be referred to TCC on discharge?: No  Estimated date of discharge: TBD  Discharge is dependent on: Clinical status, consultant recs  At this point Mr. Gerald Vidal is expected to be discharge to: Home    Plan of care discussed with patient, family, R

## 2021-12-25 NOTE — PLAN OF CARE
Received this a.m., sinus on monitor, sbp in upper 150's this afternoon, updated , meds given as ordered, will continue to monitor.  At times haves episodes of hiccups and spitting up saliva, family stating that retching and nausea better today then y

## 2021-12-26 PROCEDURE — 99232 SBSQ HOSP IP/OBS MODERATE 35: CPT | Performed by: INTERNAL MEDICINE

## 2021-12-26 RX ORDER — AMLODIPINE BESYLATE 5 MG/1
5 TABLET ORAL DAILY
Status: DISCONTINUED | OUTPATIENT
Start: 2021-12-27 | End: 2021-12-27

## 2021-12-26 RX ORDER — AMLODIPINE BESYLATE 2.5 MG/1
2.5 TABLET ORAL ONCE
Status: COMPLETED | OUTPATIENT
Start: 2021-12-26 | End: 2021-12-26

## 2021-12-26 RX ORDER — POTASSIUM CHLORIDE 20 MEQ/1
20 TABLET, EXTENDED RELEASE ORAL ONCE
Status: COMPLETED | OUTPATIENT
Start: 2021-12-26 | End: 2021-12-26

## 2021-12-26 NOTE — PROGRESS NOTES
[facility]  Progress Note     Tirso Casillas Patient Status:  Inpatient    1950 MRN [id number]   [facility] 6NE-A Attending Tona Pizano MD   Hosp Day # 5 [facility] 600 [facility]     Chief Complaint: Incisional chest pain    S: Patient 8.7 8.1*    142 144   K 5.4* 4.5 3.8   * 113* 112   CO2 24.0 25.0 24.0     Estimated Creatinine Clearance: 31.2 mL/min (A) (based on SCr of 2.04 mg/dL (H)).     Recent Labs   Lab 12/21/21  1141   PTP 17.3*   INR 1.41*     No results for input(s) 3 doses given  13. Thrombocytopenia, monitor  1.  Better today    Quality:  · DVT Prophylaxis: SCDs  · CODE status: Full  · Steinberg: Yes  · Central line: No    Will the patient be referred to TCC on discharge?: No  Estimated date of discharge: TBD  Discharge

## 2021-12-26 NOTE — PROGRESS NOTES
Assumed care of pt. At 1. Pt. Resting in bed. Spouse at bedside. A & O x4. VSS. SOARES. Denies any pain. A-V wires remain in place, secured. Will continue to monitor closely.

## 2021-12-26 NOTE — PROGRESS NOTES
[facility]   [facility] Progress Note    Enolia Copping Patient Status:  Inpatient    1950 MRN [id number]   [facility] 6NE-A Attending Kenneth Gonzalez MD   Hosp Day # 5 PCP LUIS ALFREDO SHAFFER     Subjective:  Nausea is 90% better.  Trouble sleepi injection 4 mg, 4 mg, Intravenous, Q6H PRN  metoprolol tartrate (LOPRESSOR) tab 25 mg, 25 mg, Oral, 2x Daily(Beta Blocker)  potassium chloride IVPB premix 20 mEq, 20 mEq, Intravenous, PRN   Or  potassium chloride IVPB premix 40 mEq, 40 mEq, Intravenous, MA Disruption of external operation (surgical) wound, not elsewhere classified, subsequent encounter     Type 2 diabetes mellitus with foot ulcer, with long-term current use of insulin (HCC)     Dyslipidemia     Cardiomyopathy, ischemic     Ischemic cardiomyo

## 2021-12-26 NOTE — PLAN OF CARE
Patient arrived from CCU to [address] via wheelchair. CABG x3 POD 5, ra, lungs clear,abdomen distendended, no edema. =2 pedals and radials. NSR, Chest incision intact, right lower thigh incision with steri strips dry and intact. -1000. Goal 1250.

## 2021-12-26 NOTE — PROGRESS NOTES
[facility]  Progress Note    Ginna Santacruz Patient Status:  Inpatient    1950 MRN [id number]   [facility] 6NE-A Attending Rex Noble MD   Hosp Day # 5 PCP LUIS ALFREDO SHAFFER       Assessment and Plan:    1.  CV -status post coronary byp 8\" (1.727 m), weight 181 lb 7 oz (82.3 kg), SpO2 96 %. General: In no apparent distress. Cardiac: Regular rate and rhythm   Lungs: Diminished at bases  Abdomen: Soft, non-tender, mildly distended  Extremities: Without clubbing, cyanosis or edema.   Arlyn • metoprolol tartrate  25 mg Oral 2x Daily(Beta Blocker)       Mamadou Tamayo MD

## 2021-12-26 NOTE — PLAN OF CARE
Received this a.m., awake and alert, sitting up in chair, hemodynamically stable on monitor. Ambulating in hallway, tolerated very well.  Pt stating \"I'm 90% better today\", able to tolerate more PO this morning, Tele orders received, report called to [facility]

## 2021-12-26 NOTE — PROGRESS NOTES
[facility]  Nephrology Progress Note    Julio Bundy Attending:  Noah Huizar MD       Subjective:  No complains    insulin detemir (LEVEMIR) 100 UNIT/ML flextouch 14 Units, 14 Units, Subcutaneous, Daily  [START ON 12/27/2021] amLODIPine (NORVASC) t Oral, Q15 Min PRN   Or  dextrose 50 % injection 50 mL, 50 mL, Intravenous, Q15 Min PRN   Or  glucose (DEX4) oral liquid 30 g, 30 g, Oral, Q15 Min PRN   Or  glucose-vitamin C (DEX-4) chewable tab 8 tablet, 8 tablet, Oral, Q15 Min PRN  ipratropium-albuterol Plan: 1. AMADO/CKD baseline Cr ~ 1.5 related to DM; AMADO- nonoliguric ATN as expected in setting of CABG with underlying diabetic nephropathy. Hemodynamically stable. Meds benign. Improving  - monitor labs      2.  CAD s/p CABG x 3 12/21 EF 40%- doing we

## 2021-12-26 NOTE — PHYSICAL THERAPY NOTE
Attempted to see patient; per chart review, HGB is down to 7.7 at 5:07 today. Per department policy, patients are not seen for PT if HGB is below 8, RN aware. Will continue to follow up.

## 2021-12-27 PROCEDURE — 99232 SBSQ HOSP IP/OBS MODERATE 35: CPT | Performed by: INTERNAL MEDICINE

## 2021-12-27 RX ORDER — LISINOPRIL 20 MG/1
20 TABLET ORAL DAILY
Qty: 30 TABLET | Refills: 5 | Status: SHIPPED | OUTPATIENT
Start: 2021-12-28 | End: 2021-12-28

## 2021-12-27 RX ORDER — LISINOPRIL 20 MG/1
20 TABLET ORAL DAILY
Status: DISCONTINUED | OUTPATIENT
Start: 2021-12-27 | End: 2021-12-29

## 2021-12-27 RX ORDER — METOPROLOL SUCCINATE 50 MG/1
50 TABLET, EXTENDED RELEASE ORAL
Qty: 30 TABLET | Refills: 2 | Status: SHIPPED | OUTPATIENT
Start: 2021-12-27 | End: 2021-12-28

## 2021-12-27 NOTE — PROGRESS NOTES
[facility]     CV Surgery Progress Note    Sindhu Maldonado Patient Status:  Inpatient    1950 MRN [id number]   [facility] 8NE-A Attending Iman Monreal MD   Hosp Day # 6 PCP LUIS ALFREDO SHAFFER     Subjective:  Patient reports feeling good -Leukocytosis, likely reactive, down trending, afebrile- monitor   -Acute post op blood loss anemia/tcp, expected, improved- monitor   -Appears euvolemic, weight up from baseline   -AMADO/CKD, Cr stable at 2.04, UOP improving- renal following   -Bowel kamilah

## 2021-12-27 NOTE — PROGRESS NOTES
[facility]  Nephrology Progress Note    Rose Captain Attending:  Brenda Burden MD       Subjective:    No acute events  Feels well; ambulatory      lisinopril tab 20 mg, 20 mg, Oral, Daily  insulin detemir (LEVEMIR) 100 UNIT/ML flextouch 14 Units, 14 Oral, Q15 Min PRN   Or  dextrose 50 % injection 50 mL, 50 mL, Intravenous, Q15 Min PRN   Or  glucose (DEX4) oral liquid 30 g, 30 g, Oral, Q15 Min PRN   Or  glucose-vitamin C (DEX-4) chewable tab 8 tablet, 8 tablet, Oral, Q15 Min PRN  ipratropium-albuterol CABG with underlying diabetic nephropathy. Hemodynamically stable. Meds benign. Improving  - monitor labs      2. CAD s/p CABG x 3 12/21 EF 40%- doing well overall     3. Longstanding type 2 DM > 30 yrs with triopathy with A1C 8.0     4.  PAD s/p fem-tib

## 2021-12-27 NOTE — CARDIAC REHAB
Cardiac rehab education completed with patient  Discharge video shown  Patient referred to cardiac rehab  Appt made

## 2021-12-27 NOTE — PLAN OF CARE
Shift Note:  Assumed care of patient. Patient alert and oriented x4. Patient on room air, denies any respiratory distress, lung sounds clear bilaterally, incentive spirometer this AM 1000.  Denies any cardiac symptoms, NSR on tele, pacer wires removed at 12 adequate nutritional intake and initiate nutrition consult as needed  - Instruct patient on self management of diabetes  Outcome: Progressing     Problem: SKIN/TISSUE INTEGRITY - ADULT  Goal: Incision(s), wounds(s) or drain site(s) healing without S/S of i

## 2021-12-27 NOTE — PROGRESS NOTES
Progress Note  Zacarias Camara Patient Status:  Inpatient    1950 MRN [id number]   [facility] 8NE-A Attending Artemio Danielle MD   Hosp Day # 6 PCP LUIS ALFREDO SHAFFER     Subjective:  Patient sitting up in the chair and doing well.  Denies chest S1,S2  Lungs: Clear  Abd: Soft, non tender, non distended  Ext: No edema  Skin: Warm, dry  Neuro: No focal deficits        Medications:    • insulin detemir  14 Units Subcutaneous Daily   • amLODIPine  5 mg Oral Daily   • epoetin janet-epbx  10,000 Units Berger

## 2021-12-27 NOTE — PROGRESS NOTES
[facility]  Progress Note     Sindhu Maldonado Patient Status:  Inpatient    1950 MRN [id number]   [facility] 6NE-A Attending Iman Monreal MD   Hosp Day # 6  [facility]     Chief Complaint: Incisional chest pain    S: Patient 22* 31* 31*   CA 8.7 8.1* 8.1*    144 141   K 4.5 3.8 4.1  4.1   * 112 111   CO2 25.0 24.0 23.0     Estimated Creatinine Clearance: 31.2 mL/min (A) (based on SCr of 2.04 mg/dL (H)).     Recent Labs   Lab 12/21/21  1141   PTP 17.3*   INR 1.41* resolved    Quality:  · DVT Prophylaxis: SCDs  · CODE status: Full  · Steinberg: Yes  · Central line: No    Will the patient be referred to TCC on discharge?: No  Estimated date of discharge: 1-2 days  Discharge is dependent on: Clinical status, consultant rec

## 2021-12-27 NOTE — PHYSICAL THERAPY NOTE
PHYSICAL THERAPY TREATMENT NOTE - INPATIENT    Room Number: 4329/7261-N     Session: 1     Number of Visits to Meet Established Goals: 3    Presenting Problem: S/p CABG 12/21  Co-Morbidities : DM, HTN, CKD, PVD s/p revascularization L LE, HL     History feet with assist device: none at assistance level: supervision      Goal #4 Pt will complete flight of stairs w/ use of railing and supervision assist.      Goal #5     Goal #6     Goal Comments: Goals established on 12/24/2021 12/27/2021 all goals ongo Functional Limits  Stairs: Stairs  How Many Stairs:  (1 flight)  Device: 1 Rail  Assist: Contact guard assist  Pattern: Ascend and Descend  Ascend and Descend :  (reciprocal down, step to up)    Skilled Therapy Provided    Bed Mobility:  Rolling right: sup mask all session.

## 2021-12-28 ENCOUNTER — APPOINTMENT (OUTPATIENT)
Dept: ULTRASOUND IMAGING | Facility: HOSPITAL | Age: 71
DRG: 235 | End: 2021-12-28
Attending: PHYSICIAN ASSISTANT
Payer: MEDICARE

## 2021-12-28 PROCEDURE — 99232 SBSQ HOSP IP/OBS MODERATE 35: CPT | Performed by: INTERNAL MEDICINE

## 2021-12-28 PROCEDURE — 93926 LOWER EXTREMITY STUDY: CPT | Performed by: PHYSICIAN ASSISTANT

## 2021-12-28 RX ORDER — METOPROLOL TARTRATE 50 MG/1
50 TABLET, FILM COATED ORAL
Status: DISCONTINUED | OUTPATIENT
Start: 2021-12-28 | End: 2021-12-29

## 2021-12-28 RX ORDER — INSULIN ASPART 100 [IU]/ML
7 INJECTION, SUSPENSION SUBCUTANEOUS
Qty: 10 ML | Refills: 1 | Status: SHIPPED | OUTPATIENT
Start: 2021-12-28

## 2021-12-28 RX ORDER — METOPROLOL TARTRATE 50 MG/1
50 TABLET, FILM COATED ORAL
Qty: 60 TABLET | Refills: 3 | Status: ON HOLD | OUTPATIENT
Start: 2021-12-28 | End: 2022-01-10

## 2021-12-28 RX ORDER — LISINOPRIL 20 MG/1
20 TABLET ORAL DAILY
Qty: 30 TABLET | Refills: 5 | Status: SHIPPED | OUTPATIENT
Start: 2021-12-28 | End: 2022-01-10

## 2021-12-28 NOTE — PROGRESS NOTES
[facility]  Progress Note     Eufemia Page Patient Status:  Inpatient    1950 MRN [id number]   [facility] 6NE-A Attending Bradley Cole MD   Hosp Day # 7 [facility] [address] [facility]     Chief Complaint: Incisional chest pain    S: Patient  141 137   K 3.8 4.1  4.1 4.1    111 107   CO2 24.0 23.0 27.0     Estimated Creatinine Clearance: 37.4 mL/min (A) (based on SCr of 1.7 mg/dL (H)).     Recent Labs   Lab 12/21/21  1141   PTP 17.3*   INR 1.41*     No results for input(s): TROP, 1 per nephro given  3. Iron stuies low, IV venofer x 3 doses given  13. Thrombocytopenia, resolved    Home today.     Quality:  · DVT Prophylaxis: SCDs  · CODE status: Full  · Steinberg: Yes  · Central line: No    Will the patient be referred to TCC on discharg

## 2021-12-28 NOTE — PROGRESS NOTES
[facility]  Nephrology Progress Note    Julio Bundy Attending:  Noah Huizar MD       Subjective:    No acute events         metoprolol tartrate (LOPRESSOR) tab 50 mg, 50 mg, Oral, 2x Daily(Beta Blocker)  lisinopril tab 20 mg, 20 mg, Oral, Daily  i Or  dextrose 50 % injection 50 mL, 50 mL, Intravenous, Q15 Min PRN   Or  glucose (DEX4) oral liquid 30 g, 30 g, Oral, Q15 Min PRN   Or  glucose-vitamin C (DEX-4) chewable tab 8 tablet, 8 tablet, Oral, Q15 Min PRN  ipratropium-albuterol (DUONEB) nebulizer s with underlying diabetic nephropathy. Hemodynamically stable. Meds benign. Improving and now near baseline      2. CAD s/p CABG x 3 12/21 EF 40%- doing well overall     3. Longstanding type 2 DM > 30 yrs with triopathy with A1C 8.0     4.  PAD s/p fem-ti

## 2021-12-28 NOTE — OCCUPATIONAL THERAPY NOTE
Attempted to see the patient for OT session. Pt is out of the room for US of LE arterial duplex. Will continue to follow.

## 2021-12-28 NOTE — PROGRESS NOTES
[facility]     CV Surgery Progress Note    Rose Brown Patient Status:  Inpatient    1950 MRN [id number]   [facility] 8NE-A Attending [place], MD   Hosp Day # 7 PCP LUIS ALFREDO SHAFFER     Subjective:  Patient reports he is feeling blood loss anemia/tcp, expected, stable- monitor   -Appears euvolemic, weight up from baseline   -AMADO/CKD, Cr down to 1.7, UOP improving- renal following   -Bowel regimen   -Nausea/hiccups, KUB unremarkable- improved  -Pain management, avoid narcotics- prn

## 2021-12-28 NOTE — PHYSICAL THERAPY NOTE
PHYSICAL THERAPY TREATMENT NOTE - INPATIENT    Room Number: 3164/4980-S     Session: 2     Number of Visits to Meet Established Goals: 3    Presenting Problem: S/p CABG 12/21  Co-Morbidities : DM, HTN, CKD, PVD s/p revascularization L LE, HL     History Pt will complete flight of stairs w/ use of railing and supervision assist.      Goal #5     Goal #6       12/28/2021 goals ongoing, goal 3 achieved      SUBJECTIVE    \" I am feeling good, I am not tired\"     OBJECTIVE  Precautions: Sternal;Cardiac    WE Provided    Bed Mobility:  Rolling right: independent    Rolling left: independent     Supine<>Sit: independent    Sit<>Supine: not tested      Transfer Mobility:  Sit<>Stand: supervised    Stand<>Sit: supervised    Gait: supervised 350ft with RW    Therap

## 2021-12-28 NOTE — PROGRESS NOTES
[facility]     Cardiology Progress Note    Eufemia Joeyyoni Patient Status:  Inpatient    1950 MRN [id number]   [facility] 8NE-A Attending Bradley Cole MD   Hosp Day # 7 PCP LUIS ALFREDO SHAFFER       SUBJECTIVE:  No cardiac events overnight cyanosis or edema. Neurologic: Alert and oriented, cranial nerves grossly intact, moving all extremities  Skin: Warm and dry, no rash    Review of systems:   General:  No fevers, chills or night sweats. HEENT:  No visual or hearing changes.    Skin:  Adalgisa Peace 21  0555 21  1147 21  1740 21  2215 21  0558   PGLU 128* 146* 77 154* 121*         Tests:  EK21:   BPM, QRS:  82 ms, QTc:  544 ms, IA:  152 ms. Tele: SR 72 BPM.     Echo: 21:  1. Left ventricle:  The cavity PRN  pantoprazole (PROTONIX) EC tab 40 mg, 40 mg, Oral, QAM AC  Insulin Aspart Pen (NOVOLOG) 100 UNIT/ML flexpen 1-10 Units, 1-10 Units, Subcutaneous, TID AC and HS  Insulin Aspart Pen (NOVOLOG) 100 UNIT/ML flexpen 1-68 Units, 1-68 Units, Subcutaneous, TID bypass: '21. No claudication or wounds. Await arterial doppler. · Leukocytosis: Likely reactive. Afebrile. Summary:   Ok to discharge home if ok with CV surgery. Repeat echo in 90 days. Will order 14 day MCT upon discharge with previous bradycardia.

## 2021-12-28 NOTE — PLAN OF CARE
Patient is aox3, vss, ra, lungs clear, no edema. NSR, Chest incision dry and intact with steri strips. Right left harvest sife intact with steri strips. Dr Clyde Fleischer would like US of left leg to evaluated left leg bypasses. OK to dc from Dr Chong Adams standpoint.

## 2021-12-29 VITALS
DIASTOLIC BLOOD PRESSURE: 63 MMHG | SYSTOLIC BLOOD PRESSURE: 126 MMHG | TEMPERATURE: 98 F | HEIGHT: 68 IN | BODY MASS INDEX: 26.95 KG/M2 | OXYGEN SATURATION: 95 % | WEIGHT: 177.81 LBS | HEART RATE: 74 BPM | RESPIRATION RATE: 17 BRPM

## 2021-12-29 PROCEDURE — 99233 SBSQ HOSP IP/OBS HIGH 50: CPT | Performed by: INTERNAL MEDICINE

## 2021-12-29 PROCEDURE — 99232 SBSQ HOSP IP/OBS MODERATE 35: CPT | Performed by: INTERNAL MEDICINE

## 2021-12-29 NOTE — PROGRESS NOTES
[facility]  Nephrology Progress Note    Rfaita Lane Attending:  Sid Rodrigues MD       Assessment and Plan:    1) AMADO- nonoliguric ATN as expected in setting of CABG with underlying diabetic nephropathy- improving daily, labs near baseline     2) CK (LOPRESSOR) tab 50 mg, 50 mg, Oral, 2x Daily(Beta Blocker)  lisinopril tab 20 mg, 20 mg, Oral, Daily  insulin detemir (LEVEMIR) 100 UNIT/ML flextouch 14 Units, 14 Units, Subcutaneous, Daily  hydrALAzine HCl (APRESOLINE) injection 10 mg, 10 mg, Intravenous, (DEX-4) chewable tab 8 tablet, 8 tablet, Oral, Q15 Min PRN  ipratropium-albuterol (DUONEB) nebulizer solution 3 mL, 3 mL, Nebulization, Q4H PRN          Questions/concerns were discussed with patient and/or family by bedside.     CCtime 30 min      Puneet

## 2021-12-29 NOTE — PROGRESS NOTES
[facility]  Progress Note     Sindhu Maldonado Patient Status:  Inpatient    1950 MRN [id number]   [facility] 6NE-A Attending Iman Monreal MD   Hosp Day # 8 [facility] [address] [facility]     Chief Complaint: Incisional chest pain    S: Patient 107   CO2 24.0 23.0 27.0     Estimated Creatinine Clearance: 37.4 mL/min (A) (based on SCr of 1.7 mg/dL (H)). No results for input(s): PTP, INR in the last 168 hours. No results for input(s): TROP, CK in the last 168 hours.      Imaging: Imaging data re given  3. Iron stuies low, IV venofer x 3 doses given  13. Thrombocytopenia, resolved    Home today if ok with CV surgery.     Quality:  · DVT Prophylaxis: SCDs  · CODE status: Full  · Steinberg: Yes  · Central line: No    Will the patient be referred to TCC on

## 2021-12-29 NOTE — PROGRESS NOTES
NURSING DISCHARGE NOTE    Discharged to home via w/c  Wife picked up   Belongings taken with       Discharge instructions including medications and follow up discussed with pt ,all questions answered, verbalized understanding.

## 2021-12-29 NOTE — PROGRESS NOTES
[facility]     CV Surgery Progress Note    Jin Mckeon Patient Status:  Inpatient    1950 MRN [id number]   [facility] 8NE-A Attending Alicia Banda MD   Hosp Day # 8 PCP LUIS ALFREDO SHAFFER     Subjective:  Patient stayed overnight due to discharge today from surgical standpoint if ok with other services   -Follow up scheduled for 1/4 after Cardiology appointment for removal of [place]      -D/W Dr. Edgar Calix PA-C   Cardiothoracic Surgery   12/29/2021  9:33 AM

## 2021-12-29 NOTE — PROGRESS NOTES
Progress Note  Bree Kulkarni Patient Status:  Inpatient    1950 MRN [id number]   [facility] 8NE-A Attending Ruddy Jhaveri MD   Hosp Day # 8 PCP LUIS ALFREDO SHAFFER     Subjective:  Patient stayed overnight due to oozing from mid-sternal inci Constitutional: Negative. Cardiovascular: Positive for leg swelling. Respiratory: Negative. Physical Exam:    Gen: Alert, oriented x 3, in no apparent distress  Heent: Pupils equal, reactive.  Mucous membranes moist.   Neck: No JVD  Cardiac: Re

## 2021-12-29 NOTE — PLAN OF CARE
Pt alert bale to make need known. Denies any pain. Dressing to sternum changed by DR South's PA. Pt to be discharged home. Call light within reach.

## 2021-12-30 ENCOUNTER — HOSPITAL ENCOUNTER (INPATIENT)
Facility: HOSPITAL | Age: 71
LOS: 11 days | Discharge: HOME HEALTH | DRG: 907 | End: 2022-01-10
Attending: INTERNAL MEDICINE | Admitting: INTERNAL MEDICINE
Payer: MEDICARE

## 2021-12-30 ENCOUNTER — APPOINTMENT (OUTPATIENT)
Dept: CT IMAGING | Facility: HOSPITAL | Age: 71
DRG: 907 | End: 2021-12-30
Attending: PHYSICIAN ASSISTANT
Payer: MEDICARE

## 2021-12-30 ENCOUNTER — ANESTHESIA EVENT (OUTPATIENT)
Dept: CARDIAC SURGERY | Facility: HOSPITAL | Age: 71
DRG: 907 | End: 2021-12-30
Payer: MEDICARE

## 2021-12-30 ENCOUNTER — APPOINTMENT (OUTPATIENT)
Dept: GENERAL RADIOLOGY | Facility: HOSPITAL | Age: 71
DRG: 907 | End: 2021-12-30
Attending: PHYSICIAN ASSISTANT
Payer: MEDICARE

## 2021-12-30 DIAGNOSIS — T81.30XA WOUND DEHISCENCE: ICD-10-CM

## 2021-12-30 DIAGNOSIS — Z95.1 S/P CABG (CORONARY ARTERY BYPASS GRAFT): Primary | ICD-10-CM

## 2021-12-30 DIAGNOSIS — I50.23 ACUTE ON CHRONIC SYSTOLIC CONGESTIVE HEART FAILURE (HCC): ICD-10-CM

## 2021-12-30 PROCEDURE — 71250 CT THORAX DX C-: CPT | Performed by: PHYSICIAN ASSISTANT

## 2021-12-30 PROCEDURE — 71045 X-RAY EXAM CHEST 1 VIEW: CPT | Performed by: PHYSICIAN ASSISTANT

## 2021-12-30 PROCEDURE — 99223 1ST HOSP IP/OBS HIGH 75: CPT | Performed by: HOSPITALIST

## 2021-12-30 RX ORDER — ASPIRIN 81 MG/1
81 TABLET ORAL DAILY
Status: DISCONTINUED | OUTPATIENT
Start: 2021-12-31 | End: 2022-01-10

## 2021-12-30 RX ORDER — METOCLOPRAMIDE HYDROCHLORIDE 5 MG/ML
5 INJECTION INTRAMUSCULAR; INTRAVENOUS EVERY 8 HOURS PRN
Status: DISCONTINUED | OUTPATIENT
Start: 2021-12-30 | End: 2022-01-10

## 2021-12-30 RX ORDER — SENNOSIDES 8.6 MG
17.2 TABLET ORAL NIGHTLY PRN
Status: DISCONTINUED | OUTPATIENT
Start: 2021-12-30 | End: 2022-01-10

## 2021-12-30 RX ORDER — SODIUM PHOSPHATE, DIBASIC AND SODIUM PHOSPHATE, MONOBASIC 7; 19 G/133ML; G/133ML
1 ENEMA RECTAL ONCE AS NEEDED
Status: DISCONTINUED | OUTPATIENT
Start: 2021-12-30 | End: 2022-01-10

## 2021-12-30 RX ORDER — ATORVASTATIN CALCIUM 40 MG/1
40 TABLET, FILM COATED ORAL NIGHTLY
Status: DISCONTINUED | OUTPATIENT
Start: 2021-12-30 | End: 2022-01-10

## 2021-12-30 RX ORDER — ONDANSETRON 2 MG/ML
4 INJECTION INTRAMUSCULAR; INTRAVENOUS EVERY 6 HOURS PRN
Status: DISCONTINUED | OUTPATIENT
Start: 2021-12-30 | End: 2022-01-10

## 2021-12-30 RX ORDER — POLYETHYLENE GLYCOL 3350 17 G/17G
17 POWDER, FOR SOLUTION ORAL DAILY PRN
Status: DISCONTINUED | OUTPATIENT
Start: 2021-12-30 | End: 2022-01-10

## 2021-12-30 RX ORDER — MELATONIN
3 NIGHTLY PRN
Status: DISCONTINUED | OUTPATIENT
Start: 2021-12-30 | End: 2022-01-10

## 2021-12-30 RX ORDER — ATORVASTATIN CALCIUM 40 MG/1
40 TABLET, FILM COATED ORAL NIGHTLY
Status: DISCONTINUED | OUTPATIENT
Start: 2021-12-30 | End: 2021-12-30

## 2021-12-30 RX ORDER — DEXTROSE MONOHYDRATE 25 G/50ML
50 INJECTION, SOLUTION INTRAVENOUS
Status: DISCONTINUED | OUTPATIENT
Start: 2021-12-30 | End: 2022-01-10

## 2021-12-30 RX ORDER — BISACODYL 10 MG
10 SUPPOSITORY, RECTAL RECTAL
Status: DISCONTINUED | OUTPATIENT
Start: 2021-12-30 | End: 2022-01-10

## 2021-12-30 RX ORDER — METOCLOPRAMIDE HYDROCHLORIDE 5 MG/ML
10 INJECTION INTRAMUSCULAR; INTRAVENOUS EVERY 8 HOURS PRN
Status: DISCONTINUED | OUTPATIENT
Start: 2021-12-30 | End: 2021-12-30

## 2021-12-30 RX ORDER — ACETAMINOPHEN 325 MG/1
650 TABLET ORAL EVERY 6 HOURS PRN
Status: DISCONTINUED | OUTPATIENT
Start: 2021-12-30 | End: 2022-01-01

## 2021-12-30 NOTE — ANESTHESIA PREPROCEDURE EVALUATION
PRE-OP EVALUATION    Patient Name: Ginna Santacruz    Admit Diagnosis: FASCIAL DEHISCENCE    Pre-op Diagnosis: FASCIA  DEHISCENCE    Repair of fascial dehiscence, drainage of pleural effusion    Anesthesia Procedure: Repair of fascial dehiscence, drainage of ondansetron (ZOFRAN) injection 4 mg, 4 mg, Intravenous, Q6H PRN  [MAR Hold] Insulin Aspart Pen (NOVOLOG) 100 UNIT/ML flexpen 1-10 Units, 1-10 Units, Subcutaneous, TID AC and HS  [MAR Hold] metoclopramide (REGLAN) injection 5 mg, 5 mg, Intravenous, Q8H PRN 26.3 (L) 12/31/2021    MCV 88.6 12/31/2021    MCH 27.9 12/31/2021    MCHC 31.6 12/31/2021    RDW 15.7 12/31/2021    .0 12/31/2021     Lab Results   Component Value Date     12/31/2021    K 4.7 12/31/2021     12/31/2021    CO2 25.0 12/31/

## 2021-12-30 NOTE — H&P
[facility]  History and Physical     Az Gonzalez Patient Status:  Inpatient    1950 MRN [id number]   [facility] 8NE-A Attending Suzie Ta MD   Hosp Day # 0 PCP LUIS ALFREDO SHAFFER     Chief Complaint: Wound leaking     Histor (81 mg total) by mouth daily. , Disp: 30 tablet, Rfl: 0      Physical Exam:    /52 (BP Location: Right arm)   SpO2 98%   General: No acute distress. Alert and oriented x 3. HEENT: Normocephalic, atraumatic. EOM-I. Anicteric.   Neck: No lymphadenopathy hyperglycemic protocol     # HTN    # DL      Quality:  · DVT Prophylaxis: compression stockings   · CODE status: full   · Steinberg: no  · If COVID testing is negative, may discontinue isolation: yes      Plan of care discussed with patient     Nic Ramsay,

## 2021-12-30 NOTE — PROCEDURES
[facility]    PATIENT'S NAME: Remedios Josetolu   ATTENDING PHYSICIAN: Jose Elise M.D. OPERATING PHYSICIAN: Pedro Urbano M.D.    PATIENT ACCOUNT#:   [de-identified]    LOCATION:  [address] [place] [address]  MEDICAL RECORD #:   [id number]       DATE OF BIRTH:

## 2021-12-31 ENCOUNTER — ANESTHESIA (OUTPATIENT)
Dept: CARDIAC SURGERY | Facility: HOSPITAL | Age: 71
DRG: 907 | End: 2021-12-31
Payer: MEDICARE

## 2021-12-31 ENCOUNTER — APPOINTMENT (OUTPATIENT)
Dept: GENERAL RADIOLOGY | Facility: HOSPITAL | Age: 71
DRG: 907 | End: 2021-12-31
Attending: THORACIC SURGERY (CARDIOTHORACIC VASCULAR SURGERY)
Payer: MEDICARE

## 2021-12-31 LAB
ALBUMIN SERPL-MCNC: 2.1 G/DL (ref 3.4–5)
ALBUMIN/GLOB SERPL: 0.6 {RATIO} (ref 1–2)
ALP LIVER SERPL-CCNC: 83 U/L
ALT SERPL-CCNC: 19 U/L
ANION GAP SERPL CALC-SCNC: 5 MMOL/L (ref 0–18)
ANION GAP SERPL CALC-SCNC: 6 MMOL/L (ref 0–18)
ANTIBODY SCREEN: NEGATIVE
AST SERPL-CCNC: 16 U/L (ref 15–37)
BASE EXCESS BLDA CALC-SCNC: -2 MMOL/L (ref ?–30)
BASE EXCESS BLDA CALC-SCNC: -3.7 MMOL/L (ref ?–2)
BILIRUB SERPL-MCNC: 1.1 MG/DL (ref 0.1–2)
BILIRUB UR QL STRIP.AUTO: NEGATIVE
BODY TEMPERATURE: 97.2 F
BUN BLD-MCNC: 32 MG/DL (ref 7–18)
BUN BLD-MCNC: 33 MG/DL (ref 7–18)
CA-I BLD-SCNC: 1.09 MMOL/L (ref 1.12–1.32)
CA-I BLDA-SCNC: 1.12 MMOL/L (ref 1.12–1.32)
CALCIUM BLD-MCNC: 7.7 MG/DL (ref 8.5–10.1)
CALCIUM BLD-MCNC: 7.7 MG/DL (ref 8.5–10.1)
CHLORIDE SERPL-SCNC: 106 MMOL/L (ref 98–112)
CHLORIDE SERPL-SCNC: 108 MMOL/L (ref 98–112)
CLARITY UR REFRACT.AUTO: CLEAR
CO2 BLDA-SCNC: 24 MMOL/L (ref 22–32)
CO2 SERPL-SCNC: 23 MMOL/L (ref 21–32)
CO2 SERPL-SCNC: 25 MMOL/L (ref 21–32)
COHGB MFR BLD: 1.5 % SAT (ref 0–3)
CREAT BLD-MCNC: 1.88 MG/DL
CREAT BLD-MCNC: 1.92 MG/DL
ERYTHROCYTE [DISTWIDTH] IN BLOOD BY AUTOMATED COUNT: 15.4 %
ERYTHROCYTE [DISTWIDTH] IN BLOOD BY AUTOMATED COUNT: 15.7 %
ERYTHROCYTE [DISTWIDTH] IN BLOOD BY AUTOMATED COUNT: 15.9 %
FIO2: 40 %
GLOBULIN PLAS-MCNC: 3.4 G/DL (ref 2.8–4.4)
GLUCOSE BLD-MCNC: 128 MG/DL (ref 70–99)
GLUCOSE BLD-MCNC: 142 MG/DL (ref 70–99)
GLUCOSE BLD-MCNC: 158 MG/DL (ref 70–99)
GLUCOSE BLD-MCNC: 168 MG/DL (ref 70–99)
GLUCOSE UR STRIP.AUTO-MCNC: NEGATIVE MG/DL
HCO3 BLDA-SCNC: 20.6 MEQ/L (ref 22–26)
HCO3 BLDA-SCNC: 22.7 MEQ/L (ref 22–26)
HCT VFR BLD AUTO: 25.2 %
HCT VFR BLD AUTO: 26.3 %
HCT VFR BLD AUTO: 29.7 %
HCT VFR BLDA CALC: 23 %
HGB BLD-MCNC: 7.7 G/DL
HGB BLD-MCNC: 8.2 G/DL
HGB BLD-MCNC: 8.3 G/DL
HGB BLD-MCNC: 9.9 G/DL
INR BLD: 1.49 (ref 0.8–1.2)
ISTAT BLOOD GAS FIO2: 50 %
KETONES UR STRIP.AUTO-MCNC: NEGATIVE MG/DL
LACTATE BLDA-SCNC: <1.6 MMOL/L (ref 0.5–2)
LEUKOCYTE ESTERASE UR QL STRIP.AUTO: NEGATIVE
MCH RBC QN AUTO: 27.1 PG (ref 26–34)
MCH RBC QN AUTO: 27.9 PG (ref 26–34)
MCH RBC QN AUTO: 28.9 PG (ref 26–34)
MCHC RBC AUTO-ENTMCNC: 30.6 G/DL (ref 31–37)
MCHC RBC AUTO-ENTMCNC: 31.6 G/DL (ref 31–37)
MCHC RBC AUTO-ENTMCNC: 33.3 G/DL (ref 31–37)
MCV RBC AUTO: 86.6 FL
MCV RBC AUTO: 88.6 FL
MCV RBC AUTO: 88.7 FL
METHGB MFR BLD: 0.6 % SAT (ref 0.4–1.5)
NITRITE UR QL STRIP.AUTO: NEGATIVE
OSMOLALITY SERPL CALC.SUM OF ELEC: 291 MOSM/KG (ref 275–295)
OSMOLALITY SERPL CALC.SUM OF ELEC: 294 MOSM/KG (ref 275–295)
P/F RATIO: 323.8 MMHG
PCO2 BLDA: 33 MM HG (ref 35–45)
PCO2 BLDA: 38.6 MMHG (ref 35–45)
PEEP: 5 CM H2O
PH BLDA: 7.38 [PH] (ref 7.35–7.45)
PH BLDA: 7.41 [PH] (ref 7.35–7.45)
PH UR STRIP.AUTO: 6 [PH] (ref 5–8)
PLATELET # BLD AUTO: 196 10(3)UL (ref 150–450)
PLATELET # BLD AUTO: 205 10(3)UL (ref 150–450)
PLATELET # BLD AUTO: 209 10(3)UL (ref 150–450)
PO2 BLDA: 125 MM HG (ref 80–105)
PO2 BLDA: 137 MMHG (ref 80–105)
POTASSIUM BLD-SCNC: 4.5 MMOL/L (ref 3.6–5.1)
POTASSIUM SERPL-SCNC: 4.7 MMOL/L (ref 3.5–5.1)
POTASSIUM SERPL-SCNC: 4.7 MMOL/L (ref 3.5–5.1)
PRESSURE SUPPORT: 5 CM H2O
PROT SERPL-MCNC: 5.5 G/DL (ref 6.4–8.2)
PROT UR STRIP.AUTO-MCNC: 30 MG/DL
PROTHROMBIN TIME: 18.1 SECONDS (ref 11.6–14.8)
RBC # BLD AUTO: 2.84 X10(6)UL
RBC # BLD AUTO: 2.97 X10(6)UL
RBC # BLD AUTO: 3.43 X10(6)UL
RH BLOOD TYPE: POSITIVE
SAO2 % BLDA FROM PO2: 99 % (ref 92–100)
SAO2 % BLDA: 97 % (ref 92–100)
SAO2 % BLDA: 99 % (ref 92–100)
SARS-COV-2 RNA RESP QL NAA+PROBE: NOT DETECTED
SODIUM BLD-SCNC: 137 MMOL/L (ref 136–144)
SODIUM BLDA-SCNC: 138 MMOL/L (ref 136–145)
SODIUM BLDA-SCNC: 4.8 MMOL/L (ref 3.6–5.1)
SODIUM SERPL-SCNC: 136 MMOL/L (ref 136–145)
SODIUM SERPL-SCNC: 137 MMOL/L (ref 136–145)
SP GR UR STRIP.AUTO: 1.01 (ref 1–1.03)
UROBILINOGEN UR STRIP.AUTO-MCNC: <2 MG/DL
WBC # BLD AUTO: 10 X10(3) UL (ref 4–11)
WBC # BLD AUTO: 12.1 X10(3) UL (ref 4–11)
WBC # BLD AUTO: 12.5 X10(3) UL (ref 4–11)

## 2021-12-31 PROCEDURE — 76942 ECHO GUIDE FOR BIOPSY: CPT | Performed by: ANESTHESIOLOGY

## 2021-12-31 PROCEDURE — 93312 ECHO TRANSESOPHAGEAL: CPT | Performed by: ANESTHESIOLOGY

## 2021-12-31 PROCEDURE — 71045 X-RAY EXAM CHEST 1 VIEW: CPT | Performed by: THORACIC SURGERY (CARDIOTHORACIC VASCULAR SURGERY)

## 2021-12-31 PROCEDURE — 99232 SBSQ HOSP IP/OBS MODERATE 35: CPT | Performed by: INTERNAL MEDICINE

## 2021-12-31 PROCEDURE — 99223 1ST HOSP IP/OBS HIGH 75: CPT | Performed by: INTERNAL MEDICINE

## 2021-12-31 PROCEDURE — 0JQ63ZZ REPAIR CHEST SUBCUTANEOUS TISSUE AND FASCIA, PERCUTANEOUS APPROACH: ICD-10-PCS | Performed by: THORACIC SURGERY (CARDIOTHORACIC VASCULAR SURGERY)

## 2021-12-31 PROCEDURE — 0W9B30Z DRAINAGE OF LEFT PLEURAL CAVITY WITH DRAINAGE DEVICE, PERCUTANEOUS APPROACH: ICD-10-PCS | Performed by: THORACIC SURGERY (CARDIOTHORACIC VASCULAR SURGERY)

## 2021-12-31 PROCEDURE — 0W9930Z DRAINAGE OF RIGHT PLEURAL CAVITY WITH DRAINAGE DEVICE, PERCUTANEOUS APPROACH: ICD-10-PCS | Performed by: THORACIC SURGERY (CARDIOTHORACIC VASCULAR SURGERY)

## 2021-12-31 PROCEDURE — 30233N1 TRANSFUSION OF NONAUTOLOGOUS RED BLOOD CELLS INTO PERIPHERAL VEIN, PERCUTANEOUS APPROACH: ICD-10-PCS | Performed by: HOSPITALIST

## 2021-12-31 PROCEDURE — 0W9D30Z DRAINAGE OF PERICARDIAL CAVITY WITH DRAINAGE DEVICE, PERCUTANEOUS APPROACH: ICD-10-PCS | Performed by: THORACIC SURGERY (CARDIOTHORACIC VASCULAR SURGERY)

## 2021-12-31 RX ORDER — FUROSEMIDE 10 MG/ML
40 INJECTION INTRAMUSCULAR; INTRAVENOUS
Status: DISCONTINUED | OUTPATIENT
Start: 2021-12-31 | End: 2022-01-02

## 2021-12-31 RX ORDER — SODIUM CHLORIDE 9 MG/ML
INJECTION, SOLUTION INTRAVENOUS CONTINUOUS PRN
Status: DISCONTINUED | OUTPATIENT
Start: 2021-12-31 | End: 2021-12-31 | Stop reason: SURG

## 2021-12-31 RX ORDER — MORPHINE SULFATE 2 MG/ML
2 INJECTION, SOLUTION INTRAMUSCULAR; INTRAVENOUS EVERY 2 HOUR PRN
Status: DISCONTINUED | OUTPATIENT
Start: 2021-12-31 | End: 2022-01-10

## 2021-12-31 RX ORDER — MORPHINE SULFATE 2 MG/ML
1 INJECTION, SOLUTION INTRAMUSCULAR; INTRAVENOUS EVERY 2 HOUR PRN
Status: DISCONTINUED | OUTPATIENT
Start: 2021-12-31 | End: 2022-01-10

## 2021-12-31 RX ORDER — FUROSEMIDE 10 MG/ML
20 INJECTION INTRAMUSCULAR; INTRAVENOUS ONCE
Status: DISCONTINUED | OUTPATIENT
Start: 2021-12-31 | End: 2021-12-31

## 2021-12-31 RX ORDER — SODIUM CHLORIDE 9 MG/ML
INJECTION, SOLUTION INTRAVENOUS ONCE
Status: COMPLETED | OUTPATIENT
Start: 2021-12-31 | End: 2021-12-31

## 2021-12-31 RX ORDER — CEFAZOLIN SODIUM/WATER 2 G/20 ML
SYRINGE (ML) INTRAVENOUS AS NEEDED
Status: DISCONTINUED | OUTPATIENT
Start: 2021-12-31 | End: 2021-12-31 | Stop reason: SURG

## 2021-12-31 RX ORDER — CEFAZOLIN SODIUM 1 G/3ML
INJECTION, POWDER, FOR SOLUTION INTRAMUSCULAR; INTRAVENOUS AS NEEDED
Status: DISCONTINUED | OUTPATIENT
Start: 2021-12-31 | End: 2021-12-31 | Stop reason: HOSPADM

## 2021-12-31 RX ORDER — MORPHINE SULFATE 2 MG/ML
INJECTION, SOLUTION INTRAMUSCULAR; INTRAVENOUS
Status: DISPENSED
Start: 2021-12-31 | End: 2022-01-01

## 2021-12-31 RX ORDER — MORPHINE SULFATE 4 MG/ML
4 INJECTION, SOLUTION INTRAMUSCULAR; INTRAVENOUS EVERY 2 HOUR PRN
Status: DISCONTINUED | OUTPATIENT
Start: 2021-12-31 | End: 2022-01-10

## 2021-12-31 RX ORDER — ROCURONIUM BROMIDE 10 MG/ML
INJECTION, SOLUTION INTRAVENOUS AS NEEDED
Status: DISCONTINUED | OUTPATIENT
Start: 2021-12-31 | End: 2021-12-31 | Stop reason: SURG

## 2021-12-31 RX ORDER — ACETAMINOPHEN 10 MG/ML
1000 INJECTION, SOLUTION INTRAVENOUS EVERY 6 HOURS PRN
Status: DISCONTINUED | OUTPATIENT
Start: 2021-12-31 | End: 2022-01-01

## 2021-12-31 RX ADMIN — SODIUM CHLORIDE: 9 INJECTION, SOLUTION INTRAVENOUS at 14:08:00

## 2021-12-31 RX ADMIN — ROCURONIUM BROMIDE 50 MG: 10 INJECTION, SOLUTION INTRAVENOUS at 13:19:00

## 2021-12-31 RX ADMIN — CEFAZOLIN SODIUM/WATER 2 G: 2 G/20 ML SYRINGE (ML) INTRAVENOUS at 13:01:00

## 2021-12-31 RX ADMIN — SODIUM CHLORIDE: 9 INJECTION, SOLUTION INTRAVENOUS at 12:34:00

## 2021-12-31 RX ADMIN — ROCURONIUM BROMIDE 50 MG: 10 INJECTION, SOLUTION INTRAVENOUS at 12:40:00

## 2021-12-31 NOTE — PROGRESS NOTES
[facility] Note:  Renal Adjustment for cefazolin (ANCEF)    Lc Deal is a 66year old patient who has been prescribed cefazolin (ANCEF) 1 g every 8 hrs. The estimated creatinine clearance is 37.4 mL/min (A) (based on SCr of 1.7 mg/dL (H)).  The dose

## 2021-12-31 NOTE — PLAN OF CARE
Shift Note:   - Patient direct admit from Dr. Yumiko Montes office. Alert and oriented x4, uses glasses for reading material. Patient lung sounds clear, denies any difficulty breathing, on room air. Patient denies any cardiac symptoms, NSR on tele.  Patient denies an

## 2021-12-31 NOTE — PROGRESS NOTES
[facility]     Hospitalist Progress Note     Logankeke Leal Patient Status:  Inpatient    1950 MRN [id number]   [facility] 6NE-A Attending Roni Aguirre MD   Hosp Day # 1 PCP LUIS ALFREDO SHAFFER     Chief Complaint: drainage from chest in COVID19 Not Detected 12/18/2021    COVID19 Not Detected 12/10/2021       Pro-Calcitonin  No results for input(s): PCT in the last 168 hours. Cardiac  No results for input(s): TROP, PBNP in the last 168 hours.     Creatinine Kinase  No results for input(s

## 2021-12-31 NOTE — ANESTHESIA PROCEDURE NOTES
Airway  Date/Time: 12/31/2021 12:43 PM  Urgency: elective      General Information and Staff    Patient location during procedure: OR  Anesthesiologist: April De La Cruz MD  Performed: anesthesiologist     Indications and Patient Condition  Indications for a

## 2021-12-31 NOTE — PROGRESS NOTES
[address] [facility]  Cardiology Progress Note    Colin Cantor Patient Status:  Inpatient    1950 MRN [id number]   [facility] 8NE-A Attending Radha Vega MD   Hosp Day # 1 PCP LUIS ALFREDO SHAFFER       Subjective:  No acute events overnight. discretion    Discussed with patient. Questions answered. Please call with questions.      Level of care: Phil Clement MD  Interventional Cardiology  [address] [place] [address]    12/31/2021  11:16 AM

## 2021-12-31 NOTE — PROGRESS NOTES
Anesthesia Ventilator Management    Patient is s/p Repair of fascial dehiscence, drainage of pleural effusion, insertion bilateral pleural chest tubes, insertion mediastinal pleurX catheter  POD#0. Patient is currently sedated and intubated.   Vent setting

## 2021-12-31 NOTE — CONSULTS
[facility]  Cardiology Consultation    [facility] Slider Patient Status:  Inpatient    1950 MRN [id number]   [facility] 8NE-A Attending Nany Bray MD   Hosp Day # 0 [facility] [address] [facility]     Reason for Consultation:  Chest pain, CAD wi shin edema b/l  Neurologic: Alert and oriented, normal affect. Skin: Warm and dry.  Sternotomy site with dressing on    Laboratory Data:       Imaging:  CT chest and CXR ordered, not done yet    Impression:  Active Problems:    S/P CABG (coronary artery by

## 2021-12-31 NOTE — PLAN OF CARE
Alert and oriented x4 on tele monitor hr 80's sinus rhythm. Instructed npo after mn and verbalized understanding. All needs attended and will continue to monitor. Call light within reach.   Problem: CARDIOVASCULAR - ADULT  Goal: Maintains optimal cardiac ou

## 2021-12-31 NOTE — ANESTHESIA PROCEDURE NOTES
Arterial Line  Performed by: Allan Johnston MD  Authorized by: Allan Johnston MD     General Information and Staff    Procedure Start:  12/31/2021 12:43 PM  Procedure End:  12/31/2021 12:48 PM  Anesthesiologist:  Allan Johnston MD  Performed By:  Sid Peck

## 2021-12-31 NOTE — PLAN OF CARE
Pt. Is alert and oriented times four. Lungs diminished on ausculation. Pt. Is sinus rhythm on monitor. Chest incision draining but dressing dry and intact at present. Pt. Has low grade temperature this AM: tylenol given. Pt. Is NPO for surgery.

## 2021-12-31 NOTE — CONSULTS
[facility]  Report of Consultation    Radhika Sow Patient Status:  Inpatient    1950 MRN [id number]   [facility] 8NE-A Attending Odean Landau, MD   Hosp Day # 1 PCP LUIS ALFREDO SHAFFER       Assessment / Plan:    1) CKD 3- baseline Cr sodium chloride 0.9% 100 mL IVPB-ADDV, 1 g, Intravenous, q12h  •  metoprolol tartrate (LOPRESSOR) tab 25 mg, 25 mg, Oral, 2x Daily(Beta Blocker)  •  aspirin EC tab 81 mg, 81 mg, Oral, Daily  •  atorvastatin (LIPITOR) tab 40 mg, 40 mg, Oral, Nightly  •  Ins Intake 340 ml   Output 600 ml   Net -260 ml     Wt Readings from Last 3 Encounters:  12/31/21 : 179 lb 0.2 oz (81.2 kg)  12/29/21 : 177 lb 12.8 oz (80.6 kg)  12/09/21 : 160 lb (72.6 kg)    General: awake alert  HEENT: No scleral icterus, MMM  Neck: Suppl

## 2021-12-31 NOTE — ANESTHESIA PROCEDURE NOTES
Central Line  Performed by: Duran Espinoza MD  Authorized by: Duran Espinoza MD     General Information and Staff    Procedure Start:  12/31/2021 12:48 PM  Procedure End:  12/31/2021 1:00 PM  Anesthesiologist:  Duran Espinoza MD  Performed by:   Anesthesiol

## 2021-12-31 NOTE — ANESTHESIA POSTPROCEDURE EVALUATION
[facility]    Tirso Casillas Patient Status:  Inpatient   Age/Gender 66year old male MRN [id number]   [facility] 6NE-A Attending Lisa Waldron MD   Hosp Day # 1 PCP LUIS ALFREDO SHAFFER       Anesthesia Post-op Note    Repair of fascial dehiscenc

## 2021-12-31 NOTE — ANESTHESIA PROCEDURE NOTES
Procedure Performed: GHAZAL       Start Time:  12/31/2021 1:04 PM       End Time:   12/31/2021 2:03 PM    Preanesthesia Checklist:  Patient identified, IV assessed, risks and benefits discussed, monitors and equipment assessed, procedure being performed at [facility] Aorta    Ascending Aorta:  Size normal.  Dissection not present. Plaque thickness less than 3 mm. Mobile plaque not present. Descending Aorta:  Size normal.  Dissection not present. Plaque thickness less than 3 mm.           Atria    Right Atr

## 2021-12-31 NOTE — PLAN OF CARE
Juan Marana, [place] notified of patient's temperature of 100.2 and report from night shift of purulent drainage from chest incision. He spoke with Dr. Kalli Lopez and cultures will be obtained in the OR.

## 2022-01-01 ENCOUNTER — APPOINTMENT (OUTPATIENT)
Dept: GENERAL RADIOLOGY | Facility: HOSPITAL | Age: 72
DRG: 907 | End: 2022-01-01
Attending: THORACIC SURGERY (CARDIOTHORACIC VASCULAR SURGERY)
Payer: MEDICARE

## 2022-01-01 LAB
ALBUMIN SERPL-MCNC: 1.9 G/DL (ref 3.4–5)
ALBUMIN/GLOB SERPL: 0.5 {RATIO} (ref 1–2)
ALP LIVER SERPL-CCNC: 82 U/L
ALT SERPL-CCNC: 14 U/L
ANION GAP SERPL CALC-SCNC: 6 MMOL/L (ref 0–18)
AST SERPL-CCNC: 14 U/L (ref 15–37)
BILIRUB SERPL-MCNC: 1.2 MG/DL (ref 0.1–2)
BLOOD TYPE BARCODE: 5100
BUN BLD-MCNC: 28 MG/DL (ref 7–18)
CALCIUM BLD-MCNC: 7.5 MG/DL (ref 8.5–10.1)
CHLORIDE SERPL-SCNC: 107 MMOL/L (ref 98–112)
CO2 SERPL-SCNC: 23 MMOL/L (ref 21–32)
CREAT BLD-MCNC: 1.57 MG/DL
ERYTHROCYTE [DISTWIDTH] IN BLOOD BY AUTOMATED COUNT: 15.6 %
GLOBULIN PLAS-MCNC: 3.5 G/DL (ref 2.8–4.4)
GLUCOSE BLD-MCNC: 197 MG/DL (ref 70–99)
GLUCOSE BLD-MCNC: 219 MG/DL (ref 70–99)
GLUCOSE BLD-MCNC: 231 MG/DL (ref 70–99)
GLUCOSE BLD-MCNC: 232 MG/DL (ref 70–99)
GLUCOSE BLD-MCNC: 260 MG/DL (ref 70–99)
GLUCOSE BLD-MCNC: 347 MG/DL (ref 70–99)
HCT VFR BLD AUTO: 29.2 %
HGB BLD-MCNC: 9.3 G/DL
MCH RBC QN AUTO: 27.6 PG (ref 26–34)
MCHC RBC AUTO-ENTMCNC: 31.8 G/DL (ref 31–37)
MCV RBC AUTO: 86.6 FL
OSMOLALITY SERPL CALC.SUM OF ELEC: 295 MOSM/KG (ref 275–295)
PLATELET # BLD AUTO: 214 10(3)UL (ref 150–450)
POTASSIUM SERPL-SCNC: 4.3 MMOL/L (ref 3.5–5.1)
PROT SERPL-MCNC: 5.4 G/DL (ref 6.4–8.2)
RBC # BLD AUTO: 3.37 X10(6)UL
SODIUM SERPL-SCNC: 136 MMOL/L (ref 136–145)
WBC # BLD AUTO: 17.1 X10(3) UL (ref 4–11)

## 2022-01-01 PROCEDURE — 99232 SBSQ HOSP IP/OBS MODERATE 35: CPT | Performed by: INTERNAL MEDICINE

## 2022-01-01 PROCEDURE — 71045 X-RAY EXAM CHEST 1 VIEW: CPT | Performed by: THORACIC SURGERY (CARDIOTHORACIC VASCULAR SURGERY)

## 2022-01-01 PROCEDURE — 99233 SBSQ HOSP IP/OBS HIGH 50: CPT | Performed by: INTERNAL MEDICINE

## 2022-01-01 RX ORDER — ACETAMINOPHEN 10 MG/ML
1000 INJECTION, SOLUTION INTRAVENOUS EVERY 6 HOURS
Status: DISCONTINUED | OUTPATIENT
Start: 2022-01-01 | End: 2022-01-03

## 2022-01-01 NOTE — PLAN OF CARE
Received patient from [place] [address] s/p s/p Repair of fascial dehiscence, drainage of pleural effusion, insertion bilateral pleural chest tubes, insertion mediastinal pleurX catheter. CT x2 place to suction. Steinberg to gravity.  Precedex/levo infusing upon arrival via

## 2022-01-01 NOTE — PROGRESS NOTES
[facility]  Nephrology Progress Note    Kaykay Santizo Attending:  Marifer Thomas MD       Assessment and Plan:    1) CKD 3- baseline Cr approx 1.5 mg/dl due to longstanding DM 2 with isolated subnephrotic range proteinuria- no further w/u     2) Acute CA 7.5 01/01/2022    ALB 1.9 01/01/2022    ALKPHO 82 01/01/2022    BILT 1.2 01/01/2022    TP 5.4 01/01/2022    AST 14 01/01/2022    ALT 14 01/01/2022    INR 1.49 12/31/2021    PTP 18.1 12/31/2021    PGLU 232 01/01/2022       Imaging:   All imaging studies r Subcutaneous, TID AC and HS  metoclopramide (REGLAN) injection 5 mg, 5 mg, Intravenous, Q8H PRN          Questions/concerns were discussed with patient and/or family by bedside.           Cecelia Shankar MD  1/1/2022  9:33 AM

## 2022-01-01 NOTE — PROGRESS NOTES
[facility]   [facility] Progress Note    Fabian Zheng Patient Status:  Inpatient    1950 MRN [id number]   [facility] 6NE-A Attending Wilfrid Trevino MD   Hosp Day # 2 PCP LUIS ALFREDO SHAFFER     Subjective:  Resting in bed.      Objective:  BP Or  glucose-vitamin C (DEX-4) chewable tab 4 tablet, 4 tablet, Oral, Q15 Min PRN   Or  dextrose 50 % injection 50 mL, 50 mL, Intravenous, Q15 Min PRN   Or  glucose (DEX4) oral liquid 30 g, 30 g, Oral, Q15 Min PRN   Or  glucose-vitamin C (DEX-4) chewable ta perihilar discoid atelectasis slightly worse.    Minimal blunting of the CP angles.                Physical Exam:    Neuro: NAD intact A/O x4   Lungs: Clear no distress comfortable on RA   Heart: NSR S1 S2 Sternum stable   Abdomen: Soft non tender BS presen Can be to water seal when walking hallway   - Encourage IS/ Ambulation   - Follow chest xray   - Pain management   - Encourage IS/ Ambulation   - PPX SCD'S CECE'S   - Keep in CCU       D/W Dr.Foy Radha Smith, SALOME  1/1/2022  10:53 AM

## 2022-01-01 NOTE — PROGRESS NOTES
[facility] Progress Note    [facility] Patient Status:  Inpatient    1950 MRN [id number]   [facility] 6NE-A Attending Edilma Bailey MD   Hosp Day # 2 PCP LUIS ALFREDO SHAFFER     Subjective:  Patient status post wound de 88.7 86.6 86.6   MCH 27.2   < > 27.3   < > 27.3   < > 27.1 28.9 27.6   MCHC 30.7*   < > 30.1*   < > 30.5*   < > 30.6* 33.3 31.8   RDW 14.2   < > 14.4   < > 14.7   < > 15.4 15.9 15.6   NEPRELIM 9.70*  --  7.36  --  10.30*  --   --   --   --    WBC 13.3*   <

## 2022-01-01 NOTE — PLAN OF CARE
Assumed pt care at 0730. Pt a/o x3 and resting in bed. VSS. NSR. Pt c/o moderate generalized pain with some relief from pain medication. Midsternal wound vac to suction. Bilat chest tubes to suction. Dressings c/d/I.  PleurX catheter drained per order- 60cc

## 2022-01-01 NOTE — PROGRESS NOTES
[facility]     Hospitalist Progress Note     [facility] Patient Status:  Inpatient    1950 MRN [id number]   [facility] 6NE-A Attending Edilma Bailey MD   Hosp Day # 2  [facility]     Chief Complaint: POD 1    Subjective: Detected 12/31/2021    COVID19 Not Detected 12/18/2021    COVID19 Not Detected 12/10/2021       Pro-Calcitonin  No results for input(s): PCT in the last 168 hours. Cardiac  No results for input(s): TROP, PBNP in the last 168 hours.     Creatinine Kinase

## 2022-01-01 NOTE — PHYSICAL THERAPY NOTE
PHYSICAL THERAPY EVALUATION - INPATIENT     Room Number: 8362/7288-A  Evaluation Date: 1/1/2022  Type of Evaluation: Initial  Physician Order: PT Eval and Treat    Presenting Problem: Wound dehiscence s/p repair of fascial dehiscence  Co-Morbidities : [facility] AND [facility] 3x/week  Number of Visits to Meet Established Goals: 3      CURRENT GOALS    Goal #1 Patient is able to demonstrate supine - sit EOB @ level: supervision     Goal #2 Patient is able to demonstrate transfers Sit to/from Stand at assistance level: supervisio commode, etc.): A Little   Patient Difficulty: Moving from lying on back to sitting on the side of the bed?: A Little   How much help from another person does the patient currently need. ..    Help from Another: Moving to and from a bed to a chair (including

## 2022-01-01 NOTE — PLAN OF CARE
Patient care assumed 1900 with bilateral pleural chest tubes and a mediastinal Pleur-X catheter, ballard and L radial art line. Nasal cannula Dc'd overnight. Patient very uncomfortable but able to sleep well.  L chest tube with serous output, no air leak, farzad

## 2022-01-02 ENCOUNTER — APPOINTMENT (OUTPATIENT)
Dept: GENERAL RADIOLOGY | Facility: HOSPITAL | Age: 72
DRG: 907 | End: 2022-01-02
Attending: THORACIC SURGERY (CARDIOTHORACIC VASCULAR SURGERY)
Payer: MEDICARE

## 2022-01-02 LAB
ANION GAP SERPL CALC-SCNC: 5 MMOL/L (ref 0–18)
ATRIAL RATE: 52 BPM
BUN BLD-MCNC: 26 MG/DL (ref 7–18)
CALCIUM BLD-MCNC: 7.4 MG/DL (ref 8.5–10.1)
CHLORIDE SERPL-SCNC: 105 MMOL/L (ref 98–112)
CO2 SERPL-SCNC: 27 MMOL/L (ref 21–32)
CREAT BLD-MCNC: 1.63 MG/DL
ERYTHROCYTE [DISTWIDTH] IN BLOOD BY AUTOMATED COUNT: 15.6 %
GLUCOSE BLD-MCNC: 169 MG/DL (ref 70–99)
GLUCOSE BLD-MCNC: 187 MG/DL (ref 70–99)
GLUCOSE BLD-MCNC: 210 MG/DL (ref 70–99)
GLUCOSE BLD-MCNC: 249 MG/DL (ref 70–99)
GLUCOSE BLD-MCNC: 307 MG/DL (ref 70–99)
HCT VFR BLD AUTO: 28.4 %
HGB BLD-MCNC: 8.8 G/DL
MCH RBC QN AUTO: 27.4 PG (ref 26–34)
MCHC RBC AUTO-ENTMCNC: 31 G/DL (ref 31–37)
MCV RBC AUTO: 88.5 FL
OSMOLALITY SERPL CALC.SUM OF ELEC: 293 MOSM/KG (ref 275–295)
P AXIS: 17 DEGREES
P-R INTERVAL: 126 MS
PLATELET # BLD AUTO: 228 10(3)UL (ref 150–450)
POTASSIUM SERPL-SCNC: 3.9 MMOL/L (ref 3.5–5.1)
Q-T INTERVAL: 518 MS
QRS DURATION: 76 MS
QTC CALCULATION (BEZET): 481 MS
R AXIS: 14 DEGREES
RBC # BLD AUTO: 3.21 X10(6)UL
SODIUM SERPL-SCNC: 137 MMOL/L (ref 136–145)
T AXIS: 74 DEGREES
VENTRICULAR RATE: 52 BPM
WBC # BLD AUTO: 16.9 X10(3) UL (ref 4–11)

## 2022-01-02 PROCEDURE — 71045 X-RAY EXAM CHEST 1 VIEW: CPT | Performed by: THORACIC SURGERY (CARDIOTHORACIC VASCULAR SURGERY)

## 2022-01-02 PROCEDURE — 99232 SBSQ HOSP IP/OBS MODERATE 35: CPT | Performed by: INTERNAL MEDICINE

## 2022-01-02 PROCEDURE — 99233 SBSQ HOSP IP/OBS HIGH 50: CPT | Performed by: INTERNAL MEDICINE

## 2022-01-02 RX ORDER — POTASSIUM CHLORIDE 20 MEQ/1
40 TABLET, EXTENDED RELEASE ORAL ONCE
Status: COMPLETED | OUTPATIENT
Start: 2022-01-02 | End: 2022-01-02

## 2022-01-02 RX ORDER — FUROSEMIDE 10 MG/ML
40 INJECTION INTRAMUSCULAR; INTRAVENOUS 3 TIMES DAILY
Status: DISCONTINUED | OUTPATIENT
Start: 2022-01-02 | End: 2022-01-04

## 2022-01-02 NOTE — PROGRESS NOTES
[facility]     Hospitalist Progress Note     Marlyszurdo Deleongabriela Patient Status:  Inpatient    1950 MRN [id number]   [facility] 6NE-A Attending Orquidea Chu MD   Hosp Day # 3  [facility]     Chief Complaint: incisional pain     Berger COVID19 Not Detected 12/31/2021    COVID19 Not Detected 12/18/2021    COVID19 Not Detected 12/10/2021       Pro-Calcitonin  No results for input(s): PCT in the last 168 hours. Cardiac  No results for input(s): TROP, PBNP in the last 168 hours.     Kleber Crawford

## 2022-01-02 NOTE — PROGRESS NOTES
[facility]   CVS Progress Note    Kayli Gaffney Patient Status:  Inpatient    1950 MRN [id number]   [facility] 6NE-A Attending Paul Gonzales MD   Hosp Day # 3 PCP LUIS ALFREDO SHAFFER     Subjective:  Sitting up in chair this am . No co 70/30 FlexPen) 100 UNIT/ML injection PEN 7 Units, 7 Units, Subcutaneous, BID AC  glucose (DEX4) oral liquid 15 g, 15 g, Oral, Q15 Min PRN   Or  glucose-vitamin C (DEX-4) chewable tab 4 tablet, 4 tablet, Oral, Q15 Min PRN   Or  dextrose 50 % injection 50 mL intact A/O X4   Lungs: Clear no distress comfortable on RA   Heart: Sternum stable RRR S1 S2   Abdomen:  Soft non tender BS present no nausea   Extremities: Warm and dry generalized edema non pitting   Pulses: palpable   Incisions: Sternum with wound Vac n management   - Encourage IS/ Ambulation   - PPX SCD'S CECE'S   - Keep in CCU        D/W Dr. Atilio Burt, RN  1/2/2022  10:42 AM

## 2022-01-02 NOTE — PLAN OF CARE
Patient walked halls overnight, RA, NSR, normotensive. Bilateral pneumo chest tubes, each with moderate output overnight, mostly serous output. Mediastinal Pleur-X in place, not drained overnight. 250 urine output. Doing great!

## 2022-01-02 NOTE — CONSULTS
[facility] Pharmacy Note:  Renal Adjustment for meropenem (MERREM)    Livier Martin is a 66year old patient who has been prescribed meropenem (MERREM) 500 mg every 8 hrs. The estimated creatinine clearance is 39 mL/min (A) (based on SCr of 1.63 mg/dL (H)).  The

## 2022-01-02 NOTE — PROGRESS NOTES
[facility]  Progress Note    Paula Busch Patient Status:  Inpatient    1950 MRN [id number]   [facility] 6NE-A Attending Hassel Goldberg, MD   Hosp Day # 3 PCP LUIS ALFREDO SHAFFER     Assessment:  #Day 2 post surgery for fascial dehiscence

## 2022-01-02 NOTE — CONSULTS
INFECTIOUS DISEASE CONSULT NOTE    Sg Sever Patient Status:  Inpatient    1950 MRN [id number]   [facility] 6NE-A Attending Quin Romano MD   Hosp Day # 3 PCP Madhuri Howell Daily  •  atorvastatin (LIPITOR) tab 40 mg, 40 mg, Oral, Nightly  •  Insulin Aspart Prot & Aspart 70/30 (NovoLOG Mix 70/30 FlexPen) 100 UNIT/ML injection PEN 7 Units, 7 Units, Subcutaneous, BID AC  •  glucose (DEX4) oral liquid 15 g, 15 g, Oral, Q15 Min ID oriented x 3. Vital signs: Blood pressure 142/71, pulse 77, temperature 97.3 °F (36.3 °C), temperature source Temporal, resp. rate 16, weight 173 lb 6.4 oz (78.7 kg), SpO2 100 %. HEENT: no scleral icterus or conjunctival injection.  Moist mucous membranes 02/04/2021 104 (H)      C-Reactive Protein (mg/dL)   Date Value   02/04/2021 9.95 (H)      Vancomycin Trough (ug/mL)   Date Value   02/04/2021 17.2     Recent Labs   Lab 12/31/21  1219   COLORUR Straw   CLARITY Clear   SPECGRAVITY 1.006   GLUUR Negative atelectasis is noted. THORACIC AORTA:  Unremarkable as seen on non-contrast imaging. CHEST WALL:  No mass or axillary adenopathy. LIMITED ABDOMEN:  Limited images of the upper abdomen are unremarkable. BONES:  Sequelae of median sternotomy is noted.   Seq PROFUNDA FEMORAL:      55    LEFT SUPERFICIAL FEMORAL PROX:    63 LEFT SUPERFICIAL FEMORAL MID:      82 LEFT SUPERFICIAL FEMORAL DISTAL:    68 LEFT PROX ANASTOMOSIS:      209, PREVIOUSLY 118 LEFT PROXIMAL GRAFT:      72 LEFT MID GRAFT      72 LEFT DISTAL G improving edema or atelectasis. No large effusion.    Dictated by (CST): Joe Cheung MD on 1/02/2022 at 7:40 AM     Finalized by (CST): Joe Cheung MD on 1/02/2022 at 7:41 AM       XR CHEST AP PORTABLE  (CPT=71045)    Result Date: 1/1/2022  PROCEDU brachiocephalic vein. Endotracheal  tube tip approximately 5.9 cm from the edwige. Stable atelectasis/consolidation in the retrocardiac left lower lobe. Decrease in size of trace bilateral pleural effusions.             CONCLUSION:  No evidence of pneumo pulmonary vasculature. Lungs are clear without acute cardiopulmonary disease. No pleural disease identified. CONCLUSION:  1. Interval removal of [place]-Ramon catheter and bilateral chest tubes.   Of note, the [place]-Ramon catheter sheath remains in p projected 3 cm above the edwige. Enteric tube is projected over the stomach, with the tip not included on this image. V[place]-Ramon catheter tip is projected over the right pulmonary artery. Bilateral chest tubes in place. No pneumothorax.   Magnified heart need IV abx on dc for this infection but will discuss surgical findings with CV team  Plan discussed with pt at length, pt agrees with plan. All questions answered. Thank you for allowing me to participate in the care of this patient.  Please do not hesita

## 2022-01-02 NOTE — PLAN OF CARE
Assumed pt care at 0730. Pt a/o x4 and sitting up in the chair. VSS. NSR. Pt c/o mild to moderate generalized pain with some relief from pain medication. Sternal wound vac intact with no output. Minimal drainage from pleurX catheter.  Bilat chest tubes to s

## 2022-01-02 NOTE — PROGRESS NOTES
[facility]  Nephrology Progress Note    Estela Miranda Attending:  Orquidea Chu MD       Assessment and Plan:    1) CKD 3- baseline Cr approx 1.5 mg/dl due to longstanding DM 2 with isolated subnephrotic range proteinuria- no further w/u     2) Acute 01/02/2022    K 3.9 01/02/2022     01/02/2022    CO2 27.0 01/02/2022     01/02/2022    CA 7.4 01/02/2022    PGLU 187 01/02/2022       Imaging: All imaging studies reviewed.     Meds:   acetaminophen (OFIRMEV) infusion 1,000 mg, 1,000 mg, Intra discussed with patient and/or family by bedside.           Shantal Hawkins MD  1/2/2022  1012 AM

## 2022-01-03 ENCOUNTER — APPOINTMENT (OUTPATIENT)
Dept: GENERAL RADIOLOGY | Facility: HOSPITAL | Age: 72
DRG: 907 | End: 2022-01-03
Attending: THORACIC SURGERY (CARDIOTHORACIC VASCULAR SURGERY)
Payer: MEDICARE

## 2022-01-03 ENCOUNTER — ANESTHESIA EVENT (OUTPATIENT)
Dept: CARDIAC SURGERY | Facility: HOSPITAL | Age: 72
DRG: 907 | End: 2022-01-03
Payer: MEDICARE

## 2022-01-03 LAB
ANION GAP SERPL CALC-SCNC: 10 MMOL/L (ref 0–18)
ANTIBODY SCREEN: NEGATIVE
BUN BLD-MCNC: 26 MG/DL (ref 7–18)
CALCIUM BLD-MCNC: 7.6 MG/DL (ref 8.5–10.1)
CHLORIDE SERPL-SCNC: 103 MMOL/L (ref 98–112)
CO2 SERPL-SCNC: 25 MMOL/L (ref 21–32)
CREAT BLD-MCNC: 1.85 MG/DL
ERYTHROCYTE [DISTWIDTH] IN BLOOD BY AUTOMATED COUNT: 16 %
GLUCOSE BLD-MCNC: 158 MG/DL (ref 70–99)
GLUCOSE BLD-MCNC: 173 MG/DL (ref 70–99)
GLUCOSE BLD-MCNC: 223 MG/DL (ref 70–99)
GLUCOSE BLD-MCNC: 239 MG/DL (ref 70–99)
GLUCOSE BLD-MCNC: 383 MG/DL (ref 70–99)
HCT VFR BLD AUTO: 31.7 %
HGB BLD-MCNC: 9.3 G/DL
MCH RBC QN AUTO: 27.6 PG (ref 26–34)
MCHC RBC AUTO-ENTMCNC: 29.3 G/DL (ref 31–37)
MCV RBC AUTO: 94.1 FL
OSMOLALITY SERPL CALC.SUM OF ELEC: 294 MOSM/KG (ref 275–295)
PLATELET # BLD AUTO: 255 10(3)UL (ref 150–450)
POTASSIUM SERPL-SCNC: 4.2 MMOL/L (ref 3.5–5.1)
RBC # BLD AUTO: 3.37 X10(6)UL
RH BLOOD TYPE: POSITIVE
SODIUM SERPL-SCNC: 138 MMOL/L (ref 136–145)
WBC # BLD AUTO: 17.4 X10(3) UL (ref 4–11)

## 2022-01-03 PROCEDURE — 99233 SBSQ HOSP IP/OBS HIGH 50: CPT | Performed by: INTERNAL MEDICINE

## 2022-01-03 PROCEDURE — 71045 X-RAY EXAM CHEST 1 VIEW: CPT | Performed by: THORACIC SURGERY (CARDIOTHORACIC VASCULAR SURGERY)

## 2022-01-03 PROCEDURE — 99232 SBSQ HOSP IP/OBS MODERATE 35: CPT | Performed by: INTERNAL MEDICINE

## 2022-01-03 RX ORDER — ACETAMINOPHEN 500 MG
1000 TABLET ORAL EVERY 6 HOURS PRN
Status: DISCONTINUED | OUTPATIENT
Start: 2022-01-03 | End: 2022-01-10

## 2022-01-03 RX ORDER — ACETAMINOPHEN 500 MG
500 TABLET ORAL EVERY 6 HOURS PRN
Status: DISCONTINUED | OUTPATIENT
Start: 2022-01-03 | End: 2022-01-10

## 2022-01-03 NOTE — PROGRESS NOTES
[address] [facility]  Cardiology Progress Note    Az Gonzalez Patient Status:  Inpatient    1950 MRN [id number]   [facility] 6NE-A Attending Mark Nazario MD   Hosp Day # 4 PCP LUIS ALFREDO SHAFFER       Subjective:  No acute events overnight. metoprolol  · Lasix management per nephrology. No significant pulmonary edema mild LE edema     Discussed with patient. Questions answered.     Please call with questions.      Level of care: Brianda Dickinson MD  Interventional Cardiology  [facility]

## 2022-01-03 NOTE — PLAN OF CARE
Problem: Patient/Family Goals  Goal: Patient/Family Long Term Goal  Description: Patient's Long Term Goal: Discharge home   No fevers pain managed with Tylenol. Feels better after right chest tube removal. Pain score 1-2 on left side.  Walked hallway well obtain 12 lead EKG if indicated  - Evaluate effectiveness of antiarrhythmic and heart rate control medications as ordered  - Initiate emergency measures for life threatening arrhythmias  - Monitor electrolytes and administer replacement therapy as ordered

## 2022-01-03 NOTE — PROGRESS NOTES
[facility]     Nephrology Progress Note    Dar Hill Patient Status:  Inpatient    1950 MRN [id number]   [facility] 6NE-A Attending Esau Fisher MD   Hosp Day # 4 PCP LUIS ALFREDO SHAFFER       SUBJECTIVE:  Up walking in silva, UOP ex INR  --   --  1.49*  --   --   --   --     < > = values in this interval not displayed.        Recent Labs   Lab 12/31/21  0550 12/31/21  1442 01/01/22  0514 01/02/22  0458 01/03/22  0436    137 136 137 138   K 4.7 4.7 4.3 3.9 4.2    108 107 1 17.2 mg, Oral, Nightly PRN  bisacodyl (DULCOLAX) rectal suppository 10 mg, 10 mg, Rectal, Daily PRN  fleet enema (FLEET) 7-19 GM/118ML enema 133 mL, 1 enema, Rectal, Once PRN  ondansetron (ZOFRAN) injection 4 mg, 4 mg, Intravenous, Q6H PRN  Insulin Aspart

## 2022-01-03 NOTE — PROGRESS NOTES
[facility]     CV Surgery Progress Note    Pari Higgins Patient Status:  Inpatient    1950 MRN [id number]   [facility] 6NE-A Attending Isaac Trevino MD   Hosp Day # 4 PCP LUIS ALFREDO SHAFFER     Subjective:  Patient reports feeling bett of bilateral pleural effusion, bilateral chest tube and mediastinal pleurX placement POD#3  -s/p CABG 12/21  -HD stable  -Leukocytosis, afebrile, cultures +serratia, on meropenem- ID following, monitor   -Volume status improving   -CKD, vol OL- diuresis pe

## 2022-01-03 NOTE — ANESTHESIA PREPROCEDURE EVALUATION
PRE-OP EVALUATION    Patient Name: Fabian Zheng    Admit Diagnosis: FASCIAL DEHISCENCE    Pre-op Diagnosis: FASCIA  DEHISCENCE    STERNAL IRRIGATION AND DEBRIDEMENT    Anesthesia Procedure: STERNAL IRRIGATION AND DEBRIDEMENT (N/A )    Surgeon(s) and Role: 30 g, Oral, Q15 Min PRN   Or  [MAR Hold] glucose-vitamin C (DEX-4) chewable tab 8 tablet, 8 tablet, Oral, Q15 Min PRN  [MAR Hold] melatonin tab 3 mg, 3 mg, Oral, Nightly PRN  [MAR Hold] polyethylene glycol (PEG 3350) (MIRALAX) powder packet 17 g, 17 g, Ora Endo/Other      (+) diabetes                            Pulmonary                           Neuro/Psych                                    Past Surgical History:   Procedure Laterality Date   • FEM/POPL REVASC W/STENT  02/2021     Social History    Tobacco postoperative ventilation and CCU admission. We discussed placement of additional lines including arterial catheter, additional PIVs, central venous catheter/PAC and intraop GHAZAL.   Patient understands extubation will occur once the overall hemodynamic stat

## 2022-01-03 NOTE — PROGRESS NOTES
[facility]     Hospitalist Progress Note     [facility] Patient Status:  Inpatient    1950 MRN [id number]   [facility] 6NE-A Attending Edilma Bailey MD   Hosp Day # 4  [facility]     Chief Complaint: incisional pain     Berger --  14*  --   --    BILT 1.1  --  1.2  --   --    TP 5.5*  --  5.4*  --   --     < > = values in this interval not displayed. Estimated Creatinine Clearance: 34.4 mL/min (A) (based on SCr of 1.85 mg/dL (H)).     Recent Labs   Lab 12/31/21  1442   PTP above    Jorge Luis Zavala MD     Supplementary Documentation:         Quality:  · DVT Prophylaxis: SCD  · CODE status: full  · Steinberg: yes  · Central line: NA  · If COVID testing is negative, may discontinue isolation: yes      Will the patient be referred to T

## 2022-01-03 NOTE — OPERATIVE REPORT
[address]    PATIENT'S NAME: Suri Camejomino   ATTENDING PHYSICIAN: Javier Daley M.D.    OPERATING PHYSICIAN: Hortencia Hensley MD   PATIENT ACCOUNT#:   [id number]    LOCATION:  [address] [place] [address]  MEDICAL RECORD #:   [id number]       YOB: 1950 then placed in the usual fashion. These were 28-Austrian trocar tubes to evaluate his pleural effusions. This was without complication. The patient was returned to the ICU in stable condition. The patient's family was updated by me.      Dictated By Yazmin Salazar

## 2022-01-03 NOTE — PLAN OF CARE
Patient care assumed 1900, RA, NSR, normotensive. Bilateral pneumo chest tubes, each with moderate output overnight, mostly serous output. Mediastinal Pleur-X in place, not drained overnight.  Good urine output following lasix which was scheduled at 2100 un

## 2022-01-04 ENCOUNTER — ANESTHESIA (OUTPATIENT)
Dept: CARDIAC SURGERY | Facility: HOSPITAL | Age: 72
DRG: 907 | End: 2022-01-04
Payer: MEDICARE

## 2022-01-04 ENCOUNTER — APPOINTMENT (OUTPATIENT)
Dept: GENERAL RADIOLOGY | Facility: HOSPITAL | Age: 72
DRG: 907 | End: 2022-01-04
Attending: THORACIC SURGERY (CARDIOTHORACIC VASCULAR SURGERY)
Payer: MEDICARE

## 2022-01-04 LAB
ALBUMIN SERPL-MCNC: 1.8 G/DL (ref 3.4–5)
ALBUMIN/GLOB SERPL: 0.6 {RATIO} (ref 1–2)
ALP LIVER SERPL-CCNC: 103 U/L
ALT SERPL-CCNC: 12 U/L
ANION GAP SERPL CALC-SCNC: 6 MMOL/L (ref 0–18)
AST SERPL-CCNC: 20 U/L (ref 15–37)
BASE EXCESS BLD CALC-SCNC: -4 MMOL/L
BASE EXCESS BLD CALC-SCNC: 4 MMOL/L
BASE EXCESS BLDA CALC-SCNC: -0.5 MMOL/L (ref ?–2)
BASE EXCESS BLDA CALC-SCNC: 0.3 MMOL/L (ref ?–2)
BILIRUB SERPL-MCNC: 1 MG/DL (ref 0.1–2)
BODY TEMPERATURE: 97.2 F
BODY TEMPERATURE: 97.3 F
BUN BLD-MCNC: 31 MG/DL (ref 7–18)
CA-I BLD-SCNC: 1.1 MMOL/L (ref 1.12–1.32)
CA-I BLD-SCNC: 1.14 MMOL/L (ref 1.12–1.32)
CALCIUM BLD-MCNC: 7.7 MG/DL (ref 8.5–10.1)
CHLORIDE SERPL-SCNC: 102 MMOL/L (ref 98–112)
CO2 BLD-SCNC: 22 MMOL/L (ref 22–32)
CO2 BLD-SCNC: 28 MMOL/L (ref 22–32)
CO2 SERPL-SCNC: 28 MMOL/L (ref 21–32)
COHGB MFR BLD: 1.2 % SAT (ref 0–3)
COHGB MFR BLD: 1.6 % SAT (ref 0–3)
CREAT BLD-MCNC: 1.85 MG/DL
ERYTHROCYTE [DISTWIDTH] IN BLOOD BY AUTOMATED COUNT: 15.7 %
FIO2: 50 %
FIO2: 50 %
GLOBULIN PLAS-MCNC: 3.1 G/DL (ref 2.8–4.4)
GLUCOSE BLD-MCNC: 141 MG/DL (ref 70–99)
GLUCOSE BLD-MCNC: 173 MG/DL (ref 70–99)
GLUCOSE BLD-MCNC: 174 MG/DL (ref 70–99)
GLUCOSE BLD-MCNC: 197 MG/DL (ref 70–99)
GLUCOSE BLD-MCNC: 201 MG/DL (ref 70–99)
GLUCOSE BLD-MCNC: 245 MG/DL (ref 70–99)
HCO3 BLD-SCNC: 21 MEQ/L
HCO3 BLD-SCNC: 27.2 MEQ/L
HCO3 BLDA-SCNC: 24 MEQ/L (ref 22–26)
HCO3 BLDA-SCNC: 25.1 MEQ/L (ref 22–26)
HCT VFR BLD AUTO: 26.5 %
HCT VFR BLD CALC: 24 %
HCT VFR BLD CALC: 24 %
HGB BLD-MCNC: 8.2 G/DL
HGB BLD-MCNC: 8.3 G/DL
HGB BLD-MCNC: 8.5 G/DL
INSPIRATION SETTING TIME VENT: 0.9 %
ISTAT ACTIVATED CLOTTING TIME: 148 SECONDS (ref 74–137)
MCH RBC QN AUTO: 27.2 PG (ref 26–34)
MCHC RBC AUTO-ENTMCNC: 30.9 G/DL (ref 31–37)
MCV RBC AUTO: 88 FL
METHGB MFR BLD: 0.5 % SAT (ref 0.4–1.5)
METHGB MFR BLD: 0.5 % SAT (ref 0.4–1.5)
OSMOLALITY SERPL CALC.SUM OF ELEC: 294 MOSM/KG (ref 275–295)
P/F RATIO: 229.4 MMHG
P/F RATIO: 309 MMHG
PCO2 BLD: 34.4 MMHG
PCO2 BLD: 35.2 MMHG
PCO2 BLDA: 37 MM HG (ref 35–45)
PCO2 BLDA: 40 MM HG (ref 35–45)
PEEP: 5 CM H2O
PEEP: 5 CM H2O
PH BLD: 7.38 [PH]
PH BLD: 7.51 [PH]
PH BLDA: 7.41 [PH] (ref 7.35–7.45)
PH BLDA: 7.42 [PH] (ref 7.35–7.45)
PLATELET # BLD AUTO: 277 10(3)UL (ref 150–450)
PO2 BLD: 148 MMHG
PO2 BLD: 302 MMHG
PO2 BLDA: 110 MM HG (ref 80–105)
PO2 BLDA: 150 MM HG (ref 80–105)
POTASSIUM BLD-SCNC: 3.6 MMOL/L (ref 3.6–5.1)
POTASSIUM BLD-SCNC: 4.1 MMOL/L (ref 3.6–5.1)
POTASSIUM SERPL-SCNC: 4.3 MMOL/L (ref 3.5–5.1)
PROT SERPL-MCNC: 4.9 G/DL (ref 6.4–8.2)
RBC # BLD AUTO: 3.01 X10(6)UL
SAO2 % BLD: 100 %
SAO2 % BLD: 99 %
SAO2 % BLDA FROM PO2: 99 % (ref 92–100)
SAO2 % BLDA FROM PO2: 99 % (ref 92–100)
SAO2 % BLDA: 96 % (ref 92–100)
SAO2 % BLDA: 98 % (ref 92–100)
SODIUM BLD-SCNC: 137 MMOL/L (ref 136–145)
SODIUM BLD-SCNC: 138 MMOL/L (ref 136–145)
SODIUM SERPL-SCNC: 136 MMOL/L (ref 136–145)
TIDAL VOLUME: 450 ML
TIDAL VOLUME: 450 ML
VENT RATE: 10 /MIN
VENT RATE: 10 /MIN
WBC # BLD AUTO: 12.8 X10(3) UL (ref 4–11)

## 2022-01-04 PROCEDURE — 71045 X-RAY EXAM CHEST 1 VIEW: CPT | Performed by: THORACIC SURGERY (CARDIOTHORACIC VASCULAR SURGERY)

## 2022-01-04 PROCEDURE — 99233 SBSQ HOSP IP/OBS HIGH 50: CPT | Performed by: INTERNAL MEDICINE

## 2022-01-04 PROCEDURE — 02HV33Z INSERTION OF INFUSION DEVICE INTO SUPERIOR VENA CAVA, PERCUTANEOUS APPROACH: ICD-10-PCS | Performed by: HOSPITALIST

## 2022-01-04 PROCEDURE — 0WPD30Z REMOVAL OF DRAINAGE DEVICE FROM PERICARDIAL CAVITY, PERCUTANEOUS APPROACH: ICD-10-PCS | Performed by: THORACIC SURGERY (CARDIOTHORACIC VASCULAR SURGERY)

## 2022-01-04 PROCEDURE — 0J960ZZ DRAINAGE OF CHEST SUBCUTANEOUS TISSUE AND FASCIA, OPEN APPROACH: ICD-10-PCS | Performed by: THORACIC SURGERY (CARDIOTHORACIC VASCULAR SURGERY)

## 2022-01-04 PROCEDURE — 99232 SBSQ HOSP IP/OBS MODERATE 35: CPT | Performed by: INTERNAL MEDICINE

## 2022-01-04 RX ORDER — NITROGLYCERIN 20 MG/100ML
INJECTION INTRAVENOUS
Status: COMPLETED
Start: 2022-01-04 | End: 2022-01-04

## 2022-01-04 RX ORDER — MIDAZOLAM HYDROCHLORIDE 1 MG/ML
1 INJECTION INTRAMUSCULAR; INTRAVENOUS EVERY 30 MIN PRN
Status: DISCONTINUED | OUTPATIENT
Start: 2022-01-04 | End: 2022-01-10

## 2022-01-04 RX ORDER — EPHEDRINE SULFATE 50 MG/ML
INJECTION INTRAVENOUS AS NEEDED
Status: DISCONTINUED | OUTPATIENT
Start: 2022-01-04 | End: 2022-01-04 | Stop reason: SURG

## 2022-01-04 RX ORDER — METOPROLOL TARTRATE 50 MG/1
50 TABLET, FILM COATED ORAL
Status: DISCONTINUED | OUTPATIENT
Start: 2022-01-04 | End: 2022-01-06

## 2022-01-04 RX ORDER — IPRATROPIUM BROMIDE AND ALBUTEROL SULFATE 2.5; .5 MG/3ML; MG/3ML
3 SOLUTION RESPIRATORY (INHALATION) EVERY 4 HOURS PRN
Status: DISCONTINUED | OUTPATIENT
Start: 2022-01-04 | End: 2022-01-10

## 2022-01-04 RX ORDER — SODIUM CHLORIDE 9 MG/ML
INJECTION, SOLUTION INTRAVENOUS CONTINUOUS PRN
Status: DISCONTINUED | OUTPATIENT
Start: 2022-01-04 | End: 2022-01-04 | Stop reason: SURG

## 2022-01-04 RX ORDER — NITROGLYCERIN 20 MG/100ML
INJECTION INTRAVENOUS CONTINUOUS
Status: DISCONTINUED | OUTPATIENT
Start: 2022-01-04 | End: 2022-01-04

## 2022-01-04 RX ORDER — DEXMEDETOMIDINE HYDROCHLORIDE 4 UG/ML
INJECTION, SOLUTION INTRAVENOUS CONTINUOUS
Status: DISCONTINUED | OUTPATIENT
Start: 2022-01-04 | End: 2022-01-05

## 2022-01-04 RX ORDER — MIDAZOLAM HYDROCHLORIDE 1 MG/ML
INJECTION INTRAMUSCULAR; INTRAVENOUS AS NEEDED
Status: DISCONTINUED | OUTPATIENT
Start: 2022-01-04 | End: 2022-01-04 | Stop reason: SURG

## 2022-01-04 RX ORDER — NITROGLYCERIN 20 MG/100ML
INJECTION INTRAVENOUS CONTINUOUS
Status: DISCONTINUED | OUTPATIENT
Start: 2022-01-04 | End: 2022-01-05

## 2022-01-04 RX ORDER — FUROSEMIDE 10 MG/ML
40 INJECTION INTRAMUSCULAR; INTRAVENOUS
Status: DISCONTINUED | OUTPATIENT
Start: 2022-01-04 | End: 2022-01-09

## 2022-01-04 RX ORDER — DEXMEDETOMIDINE HYDROCHLORIDE 4 UG/ML
INJECTION, SOLUTION INTRAVENOUS CONTINUOUS PRN
Status: DISCONTINUED | OUTPATIENT
Start: 2022-01-04 | End: 2022-01-04 | Stop reason: SURG

## 2022-01-04 RX ORDER — METOPROLOL TARTRATE 5 MG/5ML
5 INJECTION INTRAVENOUS EVERY 6 HOURS
Status: DISCONTINUED | OUTPATIENT
Start: 2022-01-04 | End: 2022-01-06

## 2022-01-04 RX ORDER — LIDOCAINE HYDROCHLORIDE 10 MG/ML
5 INJECTION, SOLUTION EPIDURAL; INFILTRATION; INTRACAUDAL; PERINEURAL
Status: COMPLETED | OUTPATIENT
Start: 2022-01-04 | End: 2022-01-04

## 2022-01-04 RX ORDER — ROCURONIUM BROMIDE 10 MG/ML
INJECTION, SOLUTION INTRAVENOUS AS NEEDED
Status: DISCONTINUED | OUTPATIENT
Start: 2022-01-04 | End: 2022-01-04 | Stop reason: SURG

## 2022-01-04 RX ORDER — CHLORHEXIDINE GLUCONATE 0.12 MG/ML
15 RINSE ORAL
Status: DISCONTINUED | OUTPATIENT
Start: 2022-01-04 | End: 2022-01-05

## 2022-01-04 RX ORDER — LIDOCAINE HYDROCHLORIDE 10 MG/ML
INJECTION, SOLUTION EPIDURAL; INFILTRATION; INTRACAUDAL; PERINEURAL AS NEEDED
Status: DISCONTINUED | OUTPATIENT
Start: 2022-01-04 | End: 2022-01-04 | Stop reason: SURG

## 2022-01-04 RX ADMIN — LIDOCAINE HYDROCHLORIDE 40 MG: 10 INJECTION, SOLUTION EPIDURAL; INFILTRATION; INTRACAUDAL; PERINEURAL at 14:50:00

## 2022-01-04 RX ADMIN — ROCURONIUM BROMIDE 20 MG: 10 INJECTION, SOLUTION INTRAVENOUS at 15:44:00

## 2022-01-04 RX ADMIN — LIDOCAINE HYDROCHLORIDE 10 MG: 10 INJECTION, SOLUTION EPIDURAL; INFILTRATION; INTRACAUDAL; PERINEURAL at 14:40:00

## 2022-01-04 RX ADMIN — ROCURONIUM BROMIDE 50 MG: 10 INJECTION, SOLUTION INTRAVENOUS at 14:50:00

## 2022-01-04 RX ADMIN — SODIUM CHLORIDE: 9 INJECTION, SOLUTION INTRAVENOUS at 15:19:00

## 2022-01-04 RX ADMIN — EPHEDRINE SULFATE 5 MG: 50 INJECTION INTRAVENOUS at 15:16:00

## 2022-01-04 RX ADMIN — SODIUM CHLORIDE: 9 INJECTION, SOLUTION INTRAVENOUS at 14:34:00

## 2022-01-04 RX ADMIN — DEXMEDETOMIDINE HYDROCHLORIDE 1 MCG/KG/HR: 4 INJECTION, SOLUTION INTRAVENOUS at 15:34:00

## 2022-01-04 RX ADMIN — MIDAZOLAM HYDROCHLORIDE 2 MG: 1 INJECTION INTRAMUSCULAR; INTRAVENOUS at 14:56:00

## 2022-01-04 RX ADMIN — ROCURONIUM BROMIDE 30 MG: 10 INJECTION, SOLUTION INTRAVENOUS at 15:03:00

## 2022-01-04 NOTE — ANESTHESIA PROCEDURE NOTES
Arterial Line  Performed by: Carmela Finley MD  Authorized by: Carmela Finley MD     General Information and Staff    Procedure Start:  1/4/2022 2:48 PM  Procedure End:  1/4/2022 2:54 PM  Anesthesiologist:  Carmela Finley MD  Performed By:  Anesthesiolog

## 2022-01-04 NOTE — PROGRESS NOTES
INFECTIOUS DISEASE PROGRESS NOTE    Shira Cuello Patient Status:  Inpatient    1950 MRN [id number]   [facility] 6NE-A Attending Rocío Allen MD   Hosp Day # 4 PCP Odette Escobar suppository 10 mg, 10 mg, Rectal, Daily PRN  •  fleet enema (FLEET) 7-19 GM/118ML enema 133 mL, 1 enema, Rectal, Once PRN  •  ondansetron (ZOFRAN) injection 4 mg, 4 mg, Intravenous, Q6H PRN  •  Insulin Aspart Pen (NOVOLOG) 100 UNIT/ML flexpen 1-10 Units, 1 1.63* 1.85*   GFRAA 39*   < > 50* 48* 41*   GFRNAA 34*   < > 43* 41* 35*   CA 7.7*   < > 7.5* 7.4* 7.6*   ALB 2.1*  --  1.9*  --   --       < > 136 137 138   K 4.7   < > 4.3 3.9 4.2      < > 107 105 103   CO2 23.0   < > 23.0 27.0 25.0   ALKPHO Radiology Data Registry) which includes the Dose  Index Registry. PATIENT STATED HISTORY: (As transcribed by Technologist)     FINDINGS:  LUNGS:  Calcified granuloma in the right upper lobe is noted.  VASCULATURE:  Thrombus cannot be excluded without intra in the velocity at the proximal anastomosis of 209 centimeters/second, previously 118 centimeters/second. There is slight decrease in the velocity at the distal anastomosis, presently 146, previously 205. No occlusion. No significant stenosis.   All wavef obtained. COMPARISON:  [facility] , XR, XR CHEST AP PORTABLE  (CPT=71045), 1/01/2022, 4:56 AM.  INDICATIONS:  chest tube medistinal drain  PATIENT STATED HISTORY: (As transcribed by Technologist)  Patient offered no additional history at this time. COMPARISON:  [facility] , XR, XR CHEST AP PORTABLE  (CPT=71045), 12/30/2021, 8:42 PM.  INDICATIONS:  rule out pneumothorax bilateral  PATIENT STATED HISTORY: (As transcribed by Technologist)  Patient offered no additional history at this time.         FINDINGS: 12/22/2021, 4:45 AM.  INDICATIONS:  chest tube removal  PATIENT STATED HISTORY: (As transcribed by Technologist)  Patient offered no additional history at this time.     FINDINGS:  There has been interval removal of the patient's [place]-Ramon catheter, with a 12/22/2021 at 6:03 AM       XR CHEST AP PORTABLE  (CPT=71045)    Result Date: 12/21/2021  PROCEDURE:  XR CHEST AP PORTABLE  (CPT=71045)  TECHNIQUE:  AP chest radiograph was obtained.   COMPARISON:  [facility] , XR, XR CHEST AP PORTABLE  (CPT=71045), 12/14/2021, study there is no specific evidence of an acute abnormality. Dictated by (CST): Tacos Ruiz MD on 12/14/2021 at 2:51 PM     Finalized by (CST): Tacos Ruiz MD on 12/14/2021 at 2:52 PM         ASSESSMENT:  1.  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

## 2022-01-04 NOTE — PHYSICAL THERAPY NOTE
IP PT attempted, per RN, pt returned to bed to rest, requesting to attempt again later this AM. Will re-attempt as schedule permits. IP PT attempt x 2:pt awaiting PICC placement. Will re-attempt as schedule permits.

## 2022-01-04 NOTE — PROGRESS NOTES
[facility]     Nephrology Progress Note    Suraj Moore Patient Status:  Inpatient    1950 MRN [id number]   [facility] 6NE-A Attending Audrey Banda MD   Hosp Day # 5 PCP LUIS ALFREDO SHFAFER       SUBJECTIVE:  NO acute events  Pain cont (NOVOLOG) 100 UNIT/ML flexpen 1-10 Units, 1-10 Units, Subcutaneous, TID AC and HS  metoclopramide (REGLAN) injection 5 mg, 5 mg, Intravenous, Q8H PRN          Physical Exam:   /53 (BP Location: Right arm)   Pulse 74   Temp 97.9 °F (36.6 °C) (Temporal s/p repair. Noted to have serratia. abx per ID    4. Anemia- in setting of surgery. Received IV Fe and PREETHI; monitor         Questions/concerns were discussed with patient and/or family by bedside.       Felicia Mess  1/4/2022

## 2022-01-04 NOTE — PROGRESS NOTES
[facility]     CV Surgery Progress Note    Dong Cazares Patient Status:  Inpatient    1950 MRN [id number]   [facility] 6NE-A Attending Rosa Tinsley MD   Hosp Day # 5 PCP LUIS ALFREDO SHAFFER     Subjective:  Patient feeling ok, had a ro -Volume status improving   -CKD, vol OL- diuresis per renal   -Bowel regimen   -Pain management, prn tylenol  -Right pleural chest tube removed, keep left pleural and mediastinal tubes for now, may walk on water seal   -DVT PPX: TEDs/SCDs  -Encourage IS/

## 2022-01-04 NOTE — PROGRESS NOTES
[address] [facility]  Cardiology Progress Note    Jarrod Slider Patient Status:  Inpatient    1950 MRN [id number]   [facility] 6NE-A Attending Socorro Graf MD   Hosp Day # 5 PCP LUIS ALFREDO SHAFFER       Subjective:  No acute events overnight. 50mg PO BID  · Lasix management per nephrology. Agree with decreasing lasix frequency     Discussed with patient.  Questions answered.     Please call with questions.      Level of care: L3    Glenny Darling MD  Interventional Cardiology  [facility]

## 2022-01-04 NOTE — CM/SW NOTE
In anticipation of need for antibiotics @ discharge, sultana referral sent to [facility] to check benefits; patient already has residential [facility]

## 2022-01-04 NOTE — PLAN OF CARE
C/O incisional pain and being tired and weak. Up to chair hard to get up to standing position but once she does able to walk . Using walking and gait belt for safety . Walk to hallway x2.  Denies dizziness states short of breath sat while walking 85 more co

## 2022-01-04 NOTE — PROGRESS NOTES
Anesthesia Ventilator Management    Patient is s/p STERNAL IRRIGATION AND DEBRIDEMENT. POD#0. Patient is currently sedated and intubated. Vent settings: PRVC 450/10/5/50%  ABG, CXR pending    Will wean FiO2 as tolerated.   Plan for extubation after CPAP

## 2022-01-04 NOTE — PROGRESS NOTES
INFECTIOUS DISEASE PROGRESS NOTE    Michael Falk Patient Status:  Inpatient    1950 MRN [id number]   [facility] 6NE-A Attending Evens Dewitt MD   Hosp Day # 5 PCP Vince Ingram bisacodyl (DULCOLAX) rectal suppository 10 mg, 10 mg, Rectal, Daily PRN  •  fleet enema (FLEET) 7-19 GM/118ML enema 133 mL, 1 enema, Rectal, Once PRN  •  ondansetron (ZOFRAN) injection 4 mg, 4 mg, Intravenous, Q6H PRN  •  Insulin Aspart Pen (NOVOLOG) 100 U 4.3      < > 107   < > 105 103 102   CO2 23.0   < > 23.0   < > 27.0 25.0 28.0   ALKPHO 83  --  82  --   --   --  103   AST 16  --  14*  --   --   --  20   ALT 19  --  14*  --   --   --  12*   BILT 1.1  --  1.2  --   --   --  1.0   TP 5.5*  --  5.4* Technologist)     FINDINGS:  LUNGS:  Calcified granuloma in the right upper lobe is noted. VASCULATURE:  Thrombus cannot be excluded without intravenous contrast.  AVA:  No mass or adenopathy. MEDIASTINUM:  No mass or adenopathy.   CARDIAC:  Coronary jerrod There is slight decrease in the velocity at the distal anastomosis, presently 146, previously 205. No occlusion. No significant stenosis. All waveforms are biphasic, except for the left PTA which has triphasic waveforms. OTHER:  None.    PEAK VELOCITIES ( tube medistinal drain  PATIENT STATED HISTORY: (As transcribed by Technologist)  Patient offered no additional history at this time. CONCLUSION:    Stable bilateral chest tubes. No appreciable pneumothorax either side at this time.   Stable ca bilateral  PATIENT STATED HISTORY: (As transcribed by Technologist)  Patient offered no additional history at this time. FINDINGS:  Bilateral chest tubes are in place. No significant pneumothorax identified.   Endoscope tip in the region of the stom offered no additional history at this time. FINDINGS:  There has been interval removal of the patient's [place]-Ramon catheter, with a persisting catheter sheath in place within the right IJ. There has been interval removal of the bilateral chest tubes.   C (CPT=71045)  TECHNIQUE:  AP chest radiograph was obtained.   COMPARISON:  [facility] , XR, XR CHEST AP PORTABLE  (CPT=71045), 12/14/2021, 2:13 PM.  INDICATIONS:  s/p surgery  PATIENT STATED HISTORY: (As transcribed by Technologist)  Patient offered no additiona Finalized by (CST): Shira Loco MD on 12/14/2021 at 2:52 PM         ASSESSMENT:  1. 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 CAD sp CABG  3. Reactive leukocytosis, improving  4. CKD  5. Type 2 DM  6. PAD sp bypass  7.  Post op anemia

## 2022-01-04 NOTE — PLAN OF CARE
1600 Received from OR sedated intubated on Precedex gtt. S/P Sternal irrigation and debridement . Plans in extubate in am . Left chest tube connected to suction. Steinberg draining well. Chest xray done. Wife updated by Dr. Thorpe Gave care plan.  Wound nurse called co

## 2022-01-04 NOTE — ANESTHESIA PROCEDURE NOTES
Peripheral IV  Date/Time: 1/4/2022 2:58 PM  Inserted by: Turner Dandy, MD    Placement  Needle size: 18 G  Laterality: right  Location: hand  Local anesthetic: none  Site prep: alcohol  Technique: anatomical landmarks  Attempts: 1

## 2022-01-04 NOTE — ANESTHESIA PROCEDURE NOTES
Airway  Date/Time: 1/4/2022 2:53 PM  Urgency: elective      General Information and Staff    Patient location during procedure: OR  Anesthesiologist: Rigo Saleh MD  Performed: anesthesiologist     Indications and Patient Condition  Indications for airw

## 2022-01-04 NOTE — PLAN OF CARE
Assumed care of the pt at approx. 1930 pm. A&O x4, follows commands. Denies SOB, on RA. Sats in mid 90s. Max pt's temp 100 but pt likes to keep his room really warm. In SR, HR 70s-80s. SBP low 100s -120s.  CT in place to wall suction draining serosanguinous

## 2022-01-04 NOTE — ANESTHESIA POSTPROCEDURE EVALUATION
[facility]    Kaykay Santizo Patient Status:  Inpatient   Age/Gender 66year old male MRN [id number]   [facility] ([place], [place]) 6NE-A Attending Marifer Thomas MD   Hosp Day # 5 PCP LUIS ALFREDO SHAFFER       Anesthesia Post-op Note    STERNAL IRRIGATION AND DEBR

## 2022-01-04 NOTE — PROGRESS NOTES
[facility]     Hospitalist Progress Note     Travis Goncalves Patient Status:  Inpatient    1950 MRN [id number]   [facility] 6NE-A Attending Naomi Vergara MD   Hosp Day # 5 PCP LUIS ALFREDO SHAFFER     Chief Complaint: incisional pain     Berger 3.9 4.2 4.3      < > 107   < > 105 103 102   CO2 23.0   < > 23.0   < > 27.0 25.0 28.0   ALKPHO 83  --  82  --   --   --  103   AST 16  --  14*  --   --   --  20   ALT 19  --  14*  --   --   --  12*   BILT 1.1  --  1.2  --   --   --  1.0   TP 5.5*  -- care:   reop today   abx per ID   Monitor labs  CT left per CVS   Pain control      Plan of care discussed with patient. RN      Ayesha Cruz, NP       Addendum:  Pt seen and examined earlier this am,. Agree with above.    General: NAD  CVS: RRR  RS: CTA

## 2022-01-04 NOTE — RESPIRATORY THERAPY NOTE
Received pt from OR with intubated, vent settings:vc+/10/450/+5/50%, BS diminished and clear bilateral. Did abg, no changes made. Will repeat abg at 2030. Will continue to monitor pt closely.

## 2022-01-04 NOTE — PLAN OF CARE
NPO today for incision and drainage of sternotomy per Dr. Manuel Free wife ok to see patient prior to surgery. He did discuss care plan with patient and wife @ bedside. See flow sheet for ongoing assessments. Left chest tube management .  Mediastinal pleurx cath cl

## 2022-01-05 ENCOUNTER — APPOINTMENT (OUTPATIENT)
Dept: GENERAL RADIOLOGY | Facility: HOSPITAL | Age: 72
DRG: 907 | End: 2022-01-05
Attending: THORACIC SURGERY (CARDIOTHORACIC VASCULAR SURGERY)
Payer: MEDICARE

## 2022-01-05 LAB
ANION GAP SERPL CALC-SCNC: 7 MMOL/L (ref 0–18)
BASE EXCESS BLDA CALC-SCNC: 2.5 MMOL/L (ref ?–2)
BODY TEMPERATURE: 98.6 F
BUN BLD-MCNC: 27 MG/DL (ref 7–18)
CALCIUM BLD-MCNC: 8 MG/DL (ref 8.5–10.1)
CHLORIDE SERPL-SCNC: 102 MMOL/L (ref 98–112)
CO2 SERPL-SCNC: 27 MMOL/L (ref 21–32)
COHGB MFR BLD: 1.1 % SAT (ref 0–3)
CREAT BLD-MCNC: 1.59 MG/DL
FIO2: 40 %
GLUCOSE BLD-MCNC: 179 MG/DL (ref 70–99)
GLUCOSE BLD-MCNC: 201 MG/DL (ref 70–99)
GLUCOSE BLD-MCNC: 229 MG/DL (ref 70–99)
GLUCOSE BLD-MCNC: 240 MG/DL (ref 70–99)
GLUCOSE BLD-MCNC: 252 MG/DL (ref 70–99)
GLUCOSE BLD-MCNC: 360 MG/DL (ref 70–99)
HCO3 BLDA-SCNC: 27 MEQ/L (ref 22–26)
HGB BLD-MCNC: 9.3 G/DL
METHGB MFR BLD: 0.6 % SAT (ref 0.4–1.5)
OSMOLALITY SERPL CALC.SUM OF ELEC: 294 MOSM/KG (ref 275–295)
P/F RATIO: 306.7 MMHG
PCO2 BLDA: 42 MM HG (ref 35–45)
PEEP: 5 CM H2O
PH BLDA: 7.43 [PH] (ref 7.35–7.45)
PO2 BLDA: 123 MM HG (ref 80–105)
POTASSIUM SERPL-SCNC: 4.4 MMOL/L (ref 3.5–5.1)
PRESSURE SUPPORT: 5 CM H2O
SAO2 % BLDA FROM PO2: 99 % (ref 92–100)
SAO2 % BLDA: 97 % (ref 92–100)
SODIUM SERPL-SCNC: 136 MMOL/L (ref 136–145)

## 2022-01-05 PROCEDURE — 99232 SBSQ HOSP IP/OBS MODERATE 35: CPT | Performed by: HOSPITALIST

## 2022-01-05 PROCEDURE — 71045 X-RAY EXAM CHEST 1 VIEW: CPT | Performed by: THORACIC SURGERY (CARDIOTHORACIC VASCULAR SURGERY)

## 2022-01-05 PROCEDURE — 99233 SBSQ HOSP IP/OBS HIGH 50: CPT | Performed by: INTERNAL MEDICINE

## 2022-01-05 RX ORDER — ERTAPENEM 1 G/1
1 INJECTION, POWDER, LYOPHILIZED, FOR SOLUTION INTRAMUSCULAR; INTRAVENOUS DAILY
Qty: 28 EACH | Refills: 0 | Status: SHIPPED | OUTPATIENT
Start: 2022-01-05 | End: 2022-01-10

## 2022-01-05 NOTE — OPERATIVE REPORT
[facility]    PATIENT'S NAME: Miahshorty Brice   ATTENDING PHYSICIAN: Shady Rider M.D.    OPERATING PHYSICIAN: Tj South MD   PATIENT ACCOUNT#:   [id number]    LOCATION:  [address] [place] [address]  MEDICAL RECORD #:   [id number]       DATE OF BIRTH:  [birth year] bone.  Then, 3 L of antibiotic irrigation with the Pulsavac  were used, after which it was packed open with Betadine, Kerlix dressing. Patient was returned to the ICU in stable condition, ventilated. Patient's wife was updated by me.     Dict

## 2022-01-05 NOTE — PROGRESS NOTES
[facility]     CV Surgery Progress Note    Az Gonzalez Patient Status:  Inpatient    1950 MRN [id number]   [facility] 6NE-A Attending Mark Nazario MD   Hosp Day # 6 PCP LUIS ALFREDO SHAFFER     Subjective:  Patient intubated, comfortab IS/ambulation   -PT/OT/Cardiac rehab   -CCU monitoring     -D/W Dr. Sarahi Pena PA-C   Cardiothoracic Surgery   1/5/2022  7:50 AM

## 2022-01-05 NOTE — CONSULTS
[facility]  Report of Inpatient Wound Care Consultation    Michael Falk Patient Status:  Inpatient    1950 MRN [id number]   [facility] 6NE-A Attending Maciej Reynaga MD   Hosp Day # 6  [facility]     Reason for Consultation: --  1.8*  --   --   --   --   --    TP 5.5*  --  5.4*  --   --   --   --   --  4.9*  --   --   --   --   --    INR 1.49*  --   --   --   --   --   --   --   --   --   --   --   --   --    PGLU  --    < >  --    < >  --    < >  --    < >  --    < > 240* 252

## 2022-01-05 NOTE — PROGRESS NOTES
[facility]     Hospitalist Progress Note     Kayli Gaffney Patient Status:  Inpatient    1950 MRN [id number]   [facility] 6NE-A Attending Paul Gonzales MD   Hosp Day # 6  [facility]     Chief Complaint: incisional pain     Berger 28.0 27.0   ALKPHO 83  --  82  --   --  103  --    AST 16  --  14*  --   --  20  --    ALT 19  --  14*  --   --  12*  --    BILT 1.1  --  1.2  --   --  1.0  --    TP 5.5*  --  5.4*  --   --  4.9*  --     < > = values in this interval not displayed.        E bypass     # DM II- - hyperglycemic protocol      # HTN-  - BB  Restarted and monitor closely      # DL- statin         Michael Rock MD     Supplementary Documentation:      Quality:  · DVT Prophylaxis: SCD  · CODE status: full  · Steinberg: yes  · Central l

## 2022-01-05 NOTE — PROGRESS NOTES
[facility] Cardiology Progress Note    Patient seen and examined early AM.  Chart reviewed.  Discussed with respiratory therapist.    Intubated.   On sedation holiday, responding appropriately to questions    /67   Pulse 69   Temp 98.3 °F (36.8 °C) (Temporal injection 2 mg, 2 mg, Intravenous, Q2H PRN   Or  morphINE sulfate (PF) 4 MG/ML injection 4 mg, 4 mg, Intravenous, Q2H PRN  aspirin EC tab 81 mg, 81 mg, Oral, Daily  atorvastatin (LIPITOR) tab 40 mg, 40 mg, Oral, Nightly  Insulin Aspart Prot & Aspart 70/30

## 2022-01-05 NOTE — PROGRESS NOTES
[facility]     Nephrology Progress Note    Lc Ferraritingham Patient Status:  Inpatient    1950 MRN [id number]   [facility] 6NE-A Attending Edilma Bailey MD   Hosp Day # 6 PCP LUIS ALFREDO SHAFFER       SUBJECTIVE:  Notes reviewed, s/p OR yes 94.1 88.0   .0   < > 196.0 214.0 228.0 255.0 277.0   INR 1.49*  --   --   --   --   --   --     < > = values in this interval not displayed.        Recent Labs   Lab 01/01/22  0514 01/02/22  0458 01/03/22  0436 01/04/22  0455 01/05/22  0549    Or  morphINE sulfate (PF) 4 MG/ML injection 4 mg, 4 mg, Intravenous, Q2H PRN  aspirin EC tab 81 mg, 81 mg, Oral, Daily  atorvastatin (LIPITOR) tab 40 mg, 40 mg, Oral, Nightly  Insulin Aspart Prot & Aspart 70/30 (NovoLOG Mix 70/30 FlexPen) 100 UNIT/ML injec

## 2022-01-05 NOTE — PLAN OF CARE
Assumed care of the pt at approx. 1930 pm. Intubated & sedated with Precedex gtt. Awakens to voice & able to follow commands. Denies any pain. Vent managed by RT. Suctioned for scant secretions via PO & ETT. ABG done, Dr. Lowell Hickman notified by RT. Afebrile.  NS

## 2022-01-05 NOTE — PHYSICAL THERAPY NOTE
Attempted to see pt for physical therapy treatment at this time. Pt is POD #1 I&D of lower sternal wound and removal of PleurX catheter. Pt remains intubated at this time, tentative plans for extubation later today.   Will hold physical therapy treatment

## 2022-01-05 NOTE — CM/SW NOTE
01/05/22 0900   CM/SW Referral Data   Referral Source Physician   Reason for Referral Discharge planning   Informant Spouse/Significant Other   Patient Status Prior to Admission   Independent with ADLs and Mobility Yes   MSW spoke with the patient's spo

## 2022-01-05 NOTE — PROGRESS NOTES
INFECTIOUS DISEASE PROGRESS NOTE    Jose Armando Hall Patient Status:  Inpatient    1950 MRN [id number]   [facility] 6NE-A Attending Charity Guadarrama MD   Hosp Day # 6 PCP Mee Briseno tablet, 8 tablet, Oral, Q15 Min PRN  •  melatonin tab 3 mg, 3 mg, Oral, Nightly PRN  •  polyethylene glycol (PEG 3350) (MIRALAX) powder packet 17 g, 17 g, Oral, Daily PRN  •  sennosides (SENOKOT) tab 17.2 mg, 17.2 mg, Oral, Nightly PRN  •  bisacodyl (DULCO 43*   < > 35* 35* 42*   CA 7.7*   < > 7.5*   < > 7.6* 7.7* 8.0*   ALB 2.1*  --  1.9*  --   --  1.8*  --       < > 136   < > 138 136 136   K 4.7   < > 4.3   < > 4.2 4.3 4.4      < > 107   < > 103 102 102   CO2 23.0   < > 23.0   < > 25.0 28.0 27. to the [facility]) Alex Aguirre 35 ([address]) which includes the Dose  Index Registry.   PATIENT STATED HISTORY: (As transcribed by Technologist)     FINDINGS:  LUNGS:  Calcified granuloma in the right upper lobe is note in situ bypass graft is patent. There is slight increase in the velocity at the proximal anastomosis of 209 centimeters/second, previously 118 centimeters/second.   There is slight decrease in the velocity at the distal anastomosis, presently 146, previous (CPT=71045)  TECHNIQUE:  AP chest radiograph was obtained.   COMPARISON:  [facility] , XR, XR CHEST AP PORTABLE  (CPT=71045), 1/01/2022, 4:56 AM.  INDICATIONS:  chest tube medistinal drain  PATIENT STATED HISTORY: (As transcribed by Technologist)  Patient offe TECHNIQUE:  AP chest radiograph was obtained.   COMPARISON:  [facility] , XR, XR CHEST AP PORTABLE  (CPT=71045), 12/30/2021, 8:42 PM.  INDICATIONS:  rule out pneumothorax bilateral  PATIENT STATED HISTORY: (As transcribed by Technologist)  Patient offered no ad [facility] , XR, XR CHEST AP PORTABLE  (CPT=71045), 12/22/2021, 4:45 AM.  INDICATIONS:  chest tube removal  PATIENT STATED HISTORY: (As transcribed by Technologist)  Patient offered no additional history at this time.     FINDINGS:  There has been interval saad Finalized by (CST): Tone Lira MD on 12/22/2021 at 6:03 AM       XR CHEST AP PORTABLE  (CPT=71045)    Result Date: 12/21/2021  PROCEDURE:  XR CHEST AP PORTABLE  (CPT=71045)  TECHNIQUE:  AP chest radiograph was obtained.   COMPARISON:  [facility] , XR, X in lungs. Within the limits of a portable study there is no specific evidence of an acute abnormality.     Dictated by (CST): Zelalem Elaine MD on 12/14/2021 at 2:51 PM     Finalized by (CST): Zelalem Elaine MD on 12/14/2021 at 2:52 PM         ASSESSMENT:  1

## 2022-01-05 NOTE — PLAN OF CARE
Doing well. Wound care here placed wound VAC tolerated well. Sedation held passed CPAP and wean trial . Extubated following commands. Seen by Dr. Nadine Olson . Chest tube removed. Chest xray post removal. Plans to advance diet .  Up to chair encourage IS/ pain pascale

## 2022-01-06 LAB
ALBUMIN SERPL-MCNC: 1.6 G/DL (ref 3.4–5)
ALBUMIN/GLOB SERPL: 0.4 {RATIO} (ref 1–2)
ALP LIVER SERPL-CCNC: 92 U/L
ALT SERPL-CCNC: 14 U/L
ANION GAP SERPL CALC-SCNC: 7 MMOL/L (ref 0–18)
AST SERPL-CCNC: 24 U/L (ref 15–37)
BILIRUB SERPL-MCNC: 0.6 MG/DL (ref 0.1–2)
BUN BLD-MCNC: 35 MG/DL (ref 7–18)
CALCIUM BLD-MCNC: 8.1 MG/DL (ref 8.5–10.1)
CHLORIDE SERPL-SCNC: 99 MMOL/L (ref 98–112)
CO2 SERPL-SCNC: 29 MMOL/L (ref 21–32)
CREAT BLD-MCNC: 2.3 MG/DL
ERYTHROCYTE [DISTWIDTH] IN BLOOD BY AUTOMATED COUNT: 15.6 %
GLOBULIN PLAS-MCNC: 4 G/DL (ref 2.8–4.4)
GLUCOSE BLD-MCNC: 127 MG/DL (ref 70–99)
GLUCOSE BLD-MCNC: 203 MG/DL (ref 70–99)
GLUCOSE BLD-MCNC: 230 MG/DL (ref 70–99)
GLUCOSE BLD-MCNC: 249 MG/DL (ref 70–99)
GLUCOSE BLD-MCNC: 256 MG/DL (ref 70–99)
GLUCOSE BLD-MCNC: 276 MG/DL (ref 70–99)
HCT VFR BLD AUTO: 25.4 %
HGB BLD-MCNC: 8 G/DL
MCH RBC QN AUTO: 27.5 PG (ref 26–34)
MCHC RBC AUTO-ENTMCNC: 31.5 G/DL (ref 31–37)
MCV RBC AUTO: 87.3 FL
OSMOLALITY SERPL CALC.SUM OF ELEC: 294 MOSM/KG (ref 275–295)
PLATELET # BLD AUTO: 312 10(3)UL (ref 150–450)
POTASSIUM SERPL-SCNC: 4.1 MMOL/L (ref 3.5–5.1)
PROT SERPL-MCNC: 5.6 G/DL (ref 6.4–8.2)
RBC # BLD AUTO: 2.91 X10(6)UL
SODIUM SERPL-SCNC: 135 MMOL/L (ref 136–145)
WBC # BLD AUTO: 9.5 X10(3) UL (ref 4–11)

## 2022-01-06 PROCEDURE — 99233 SBSQ HOSP IP/OBS HIGH 50: CPT | Performed by: INTERNAL MEDICINE

## 2022-01-06 PROCEDURE — 99232 SBSQ HOSP IP/OBS MODERATE 35: CPT | Performed by: HOSPITALIST

## 2022-01-06 RX ORDER — DOCUSATE SODIUM 100 MG/1
100 CAPSULE, LIQUID FILLED ORAL 2 TIMES DAILY
Status: DISCONTINUED | OUTPATIENT
Start: 2022-01-06 | End: 2022-01-10

## 2022-01-06 NOTE — CM/SW NOTE
Spoke with [employer] representative rosalind, wound vac approved--he will request delivery to patient room this evening.

## 2022-01-06 NOTE — PROGRESS NOTES
[facility]     CV Surgery Progress Note    Jose Armando Hall Patient Status:  Inpatient    1950 MRN [id number]   [facility] 6NE-A Attending Charity Guadarrama MD   Hosp Day # 7 PCP LUIS ALFREDO SHAFFER     Subjective:  Patient seen and examined by following, monitor   -Volume status improved   -CKD, Cr up to 2.3- diuretic held today   -Bowel regimen   -Pain management, prn tylenol or morphine   -Right and left pleural chest tubes and mediastinal pleurX removed   -DVT PPX: TEDs/SCDs  -Encourage IS/am

## 2022-01-06 NOTE — PROGRESS NOTES
Assumed care at 1400. AOx4, denies chest pain or dyspnea. Room air. SR on tele. Up with 1 and a walker. Voids in the urinal.  Wound vac in place to his midsternal incision. QID accucheck. Sitting up in the chair eating lunch at this time.

## 2022-01-06 NOTE — CM/SW NOTE
Rep from  here--provided requested documents for wound vac @ discharge     customer care [phone]

## 2022-01-06 NOTE — PROGRESS NOTES
[facility]     Hospitalist Progress Note     Estela Miranda Patient Status:  Inpatient    1950 MRN [id number]   [facility] ([place] [place] 6NE-A Attending Orquidea Chu MD   Hosp Day # 7  [facility]     Chief Complaint: incisional pain     Berger 4.9*  --  5.6*    < > = values in this interval not displayed. Estimated Creatinine Clearance: 27.7 mL/min (A) (based on SCr of 2.3 mg/dL (H)).     Recent Labs   Lab 12/31/21  1442   PTP 18.1*   INR 1.49*            COVID-19 Lab Results    COVID-19  L SCD  · CODE status: full  · Central line: NA  Will the patient be referred to TCC on discharge?: TBD  Estimated date of discharge: TBD  Discharge is dependent on: course  At this point Mr. Chantelle Gonzales is expected to be discharge to: TBD

## 2022-01-06 NOTE — DIETARY NOTE
Soniya     Admitting diagnosis:  Wound of sternal region [S21.109A]    Ht:  5'8\"  Wt: 74.8 kg (164 lb 12.8 oz). Body mass index is 25.06 kg/m².   IBW: 70kg    Labs/Meds reviewed    Diet: Orders Placed This E

## 2022-01-06 NOTE — PHYSICAL THERAPY NOTE
PHYSICAL THERAPY TREATMENT NOTE - INPATIENT    Room Number: 9289/0123-J     Session: 1    Number of Visits to Meet Established Goals: 3    Presenting Problem: Wound dehiscence s/p repair of fascial dehiscence  Co-Morbidities : CHF, CKD, DMII     History updated      SUBJECTIVE    \"I follow the rules, that is why I am walking the walker\"    OBJECTIVE  Precautions: Sternal;Cardiac (bilateral chest tubes to suction, sternal wound vac)    WEIGHT BEARING RESTRICTION                   PAIN ASSESSMENT   Rating left: mod I     Supine<>Sit: mod I w/ hob flat   Sit<>Supine: mod I w/ hob flat  Needed some reinforcement to use log rolling technique - but able to perform w/o assist.     Transfer Mobility:  Sit<>Stand: CGA   Stand<>Sit: CGA   Gait: Pt completed gait 20

## 2022-01-06 NOTE — PROGRESS NOTES
[facility]     Nephrology Progress Note    Michael Falk Patient Status:  Inpatient    1950 MRN [id number]   [facility] 6NE-A Attending Evens Dewitt MD   Hosp Day # 7 PCP LUIS ALFREDO SHAFFER       SUBJECTIVE:  Up in chair, feels better 312.0   INR 1.49*  --   --   --   --   --   --     < > = values in this interval not displayed.        Recent Labs   Lab 01/02/22  0458 01/03/22  0436 01/04/22  0455 01/05/22  0549 01/06/22  0526    138 136 136 135*   K 3.9 4.2 4.3 4.4 4.1    10 50 mL, Intravenous, Q15 Min PRN   Or  glucose (DEX4) oral liquid 30 g, 30 g, Oral, Q15 Min PRN   Or  glucose-vitamin C (DEX-4) chewable tab 8 tablet, 8 tablet, Oral, Q15 Min PRN  melatonin tab 3 mg, 3 mg, Oral, Nightly PRN  polyethylene glycol (PEG 3350) (

## 2022-01-06 NOTE — PLAN OF CARE
Assumed care of Finna Patch around 1915. NSR per tele, VSS overnight. Pt complaining of L abdominal pain where previous chest tube was. PRN tylenol and morphine given overnight with relief. Wound vac intact with small serosangineous drainage overnight.  RUE PICC in electrolytes and administer replacement therapy as ordered  Outcome: Progressing     Problem: Safety Risk - Non-Violent Restraints  Goal: Patient will remain free from self-harm  Description: INTERVENTIONS:  - Apply the least restrictive restraint to preve

## 2022-01-06 NOTE — PROGRESS NOTES
[address] [facility] Cardiology Progress Note    Logan Leal Patient Status:  Inpatient    1950 MRN [id number]   [facility] 6NE-A Attending Mandy Houser MD   Hosp Day # 7 PCP LUIS ALFREDO SHAFFER     Subjective:  No acute events overnight well-developed. No distress. Neck: No JVD ( sitting upright in chair) present. Cardiovascular: Normal rate, regular rhythm and normal heart sounds. Wound vac to sternum, mostly serious drainage     Edema not present.   Pulmonary/Chest: Effort normal.

## 2022-01-06 NOTE — PLAN OF CARE
Assumed care of patient at 0730. Patient is alert and orientated. Wound vac intact at 125 continuous suction with drainage level at 50 since the start of my shift. Lungs sound diminished but clear and O2 stable on room air. NSR.  Insulin adjusted for high b

## 2022-01-07 LAB
ANION GAP SERPL CALC-SCNC: 7 MMOL/L (ref 0–18)
ATRIAL RATE: 75 BPM
BLOOD TYPE BARCODE: 5100
BUN BLD-MCNC: 31 MG/DL (ref 7–18)
CALCIUM BLD-MCNC: 7.9 MG/DL (ref 8.5–10.1)
CHLORIDE SERPL-SCNC: 102 MMOL/L (ref 98–112)
CO2 SERPL-SCNC: 29 MMOL/L (ref 21–32)
CREAT BLD-MCNC: 1.72 MG/DL
ERYTHROCYTE [DISTWIDTH] IN BLOOD BY AUTOMATED COUNT: 15.5 %
GLUCOSE BLD-MCNC: 124 MG/DL (ref 70–99)
GLUCOSE BLD-MCNC: 174 MG/DL (ref 70–99)
GLUCOSE BLD-MCNC: 203 MG/DL (ref 70–99)
GLUCOSE BLD-MCNC: 206 MG/DL (ref 70–99)
GLUCOSE BLD-MCNC: 271 MG/DL (ref 70–99)
HCT VFR BLD AUTO: 26.2 %
HGB BLD-MCNC: 8.1 G/DL
MCH RBC QN AUTO: 27.6 PG (ref 26–34)
MCHC RBC AUTO-ENTMCNC: 30.9 G/DL (ref 31–37)
MCV RBC AUTO: 89.1 FL
OSMOLALITY SERPL CALC.SUM OF ELEC: 297 MOSM/KG (ref 275–295)
P AXIS: 66 DEGREES
P-R INTERVAL: 126 MS
PLATELET # BLD AUTO: 325 10(3)UL (ref 150–450)
POTASSIUM SERPL-SCNC: 4.4 MMOL/L (ref 3.5–5.1)
Q-T INTERVAL: 380 MS
QRS DURATION: 70 MS
QTC CALCULATION (BEZET): 424 MS
R AXIS: 31 DEGREES
RBC # BLD AUTO: 2.94 X10(6)UL
SODIUM SERPL-SCNC: 138 MMOL/L (ref 136–145)
T AXIS: 64 DEGREES
VENTRICULAR RATE: 75 BPM
WBC # BLD AUTO: 7.9 X10(3) UL (ref 4–11)

## 2022-01-07 PROCEDURE — 99232 SBSQ HOSP IP/OBS MODERATE 35: CPT | Performed by: INTERNAL MEDICINE

## 2022-01-07 PROCEDURE — 99232 SBSQ HOSP IP/OBS MODERATE 35: CPT | Performed by: HOSPITALIST

## 2022-01-07 NOTE — PROGRESS NOTES
Progress Note  Shira Cuello Patient Status:  Inpatient    1950 MRN [id number]   [facility] 8NE-A Attending Abdias Owens MD   Hosp Day # 8 PCP LUIS ALFREDO SHAFFER     Subjective:  Patient getting wound vac applied to midsternal incision Aspart 70/30  17 Units Subcutaneous BID AC   • metoprolol tartrate  25 mg Oral 2x Daily(Beta Blocker)   • docusate sodium  100 mg Oral BID   • [Held by provider] furosemide  40 mg Intravenous BID (Diuretic)   • meropenem  500 mg Intravenous q12h   • aspiri

## 2022-01-07 NOTE — PROGRESS NOTES
INFECTIOUS DISEASE PROGRESS NOTE    Dong Cazares Patient Status:  Inpatient    1950 MRN [id number]   [facility] 6NE-A Attending Rosa Tinsley MD   Hosp Day # 8 PCP Melody Irby glucose-vitamin C (DEX-4) chewable tab 8 tablet, 8 tablet, Oral, Q15 Min PRN  •  melatonin tab 3 mg, 3 mg, Oral, Nightly PRN  •  polyethylene glycol (PEG 3350) (MIRALAX) powder packet 17 g, 17 g, Oral, Daily PRN  •  sennosides (SENOKOT) tab 17.2 mg, 17.2 m 1.72*   GFRAA 50*   < > 41*   < > 49* 31* 45*   GFRNAA 43*   < > 35*   < > 42* 27* 39*   CA 7.5*   < > 7.7*   < > 8.0* 8.1* 7.9*   ALB 1.9*  --  1.8*  --   --  1.6*  --       < > 136   < > 136 135* 138   K 4.3   < > 4.3   < > 4.4 4.1 4.4      < ([address]) which includes the Dose  Index Registry. PATIENT STATED HISTORY: (As transcribed by Technologist)     FINDINGS:  LUNGS:  Calcified granuloma in the right upper lobe is noted.  VASCULATURE:  Thrombus cannot be excluded wit increase in the velocity at the proximal anastomosis of 209 centimeters/second, previously 118 centimeters/second. There is slight decrease in the velocity at the distal anastomosis, presently 146, previously 205. No occlusion. No significant stenosis. was obtained. COMPARISON:  [facility] , XR, XR CHEST AP PORTABLE  (CPT=71045), 1/01/2022, 4:56 AM.  INDICATIONS:  chest tube medistinal drain  PATIENT STATED HISTORY: (As transcribed by Technologist)  Patient offered no additional history at this time. COMPARISON:  [facility] , XR, XR CHEST AP PORTABLE  (CPT=71045), 12/30/2021, 8:42 PM.  INDICATIONS:  rule out pneumothorax bilateral  PATIENT STATED HISTORY: (As transcribed by Technologist)  Patient offered no additional history at this time.         FINDINGS: 12/22/2021, 4:45 AM.  INDICATIONS:  chest tube removal  PATIENT STATED HISTORY: (As transcribed by Technologist)  Patient offered no additional history at this time.     FINDINGS:  There has been interval removal of the patient's [place]-Ramon catheter, with a 12/22/2021 at 6:03 AM       XR CHEST AP PORTABLE  (CPT=71045)    Result Date: 12/21/2021  PROCEDURE:  XR CHEST AP PORTABLE  (CPT=71045)  TECHNIQUE:  AP chest radiograph was obtained.   COMPARISON:  [facility] , XR, XR CHEST AP PORTABLE  (CPT=71045), 12/14/2021, study there is no specific evidence of an acute abnormality. Dictated by (CST): Tacos Ruiz MD on 12/14/2021 at 2:51 PM     Finalized by (CST): Tacos Ruiz MD on 12/14/2021 at 2:52 PM         ASSESSMENT:  1.  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

## 2022-01-07 NOTE — PLAN OF CARE
Problem: Patient/Family Goals  Goal: Patient/Family Long Term Goal  Description: Patient's Long Term Goal: Discharge home     Interventions:  - Medication regiment   - Follow up with providers post surgery   - Incision care / education   - See additional Assess for any contributing factors to confusion (electrolyte disturbances, delirium, medications)  - Discontinue any unnecessary medical devices as soon as possible  - Assess the patient's physical comfort, circulation, skin condition, hydration, nutritio

## 2022-01-07 NOTE — PROGRESS NOTES
[facility]  Progress Note    Paula Busch Patient Status:  Inpatient    1950 MRN [id number]   [facility] 8NE-A Attending Jaclyn Sharma MD   Hosp Day # 8 PCP LUIS ALFREDO SHAFFER     Subjective:  Seen and examined by Dr. Preet Lewis.  Feeling OK, #7, sternal I&D POD #3   - HD stable   - Wound vac to lower aspect of sternotomy to be changed today - we will be back to check wound at that time   - Leukocytosis - resolved   - culture growing serratia - Meropenem per ID.  IV ABX on discharge per ID   - A

## 2022-01-07 NOTE — CM/SW NOTE
spoke with wife regarding OP IV invanz infusion. Informed her of my discussion with gretta in the OP cancer center--awaiting confirmation if patients will be able to access cancer center of if 'other site' to be used--hopeful to hear response later today.

## 2022-01-07 NOTE — WOUND PROGRESS NOTE
[facility]  Report of Inpatient Wound Care Progress Note    Radhika Sow Patient Status:  Inpatient    1950 MRN [id number]   [facility] 8NE-A Attending Nanda Sullivan MD   Hosp Day # 8 PCP LUIS ALFREDO SHAFFER       SUBJECTIVE:  No compl --   --   --   --    INR 1.49*  --   --   --   --   --   --   --   --   --   --   --   --   --    PGLU  --    < >  --    < >  --    < >  --    < >  --    < > 256*  --  203* 124*    < > = values in this interval not displayed.        Follow up Imaging and M

## 2022-01-07 NOTE — PROGRESS NOTES
[facility]     Nephrology Progress Note    Ty Jefferson Patient Status:  Inpatient    1950 MRN [id number]   [facility] 6NE-A Attending Darrel Yuen MD   Hosp Day # 8 PCP LUIS ALFREDO SHAFFER       SUBJECTIVE:  Up in chair, feels \"great 8. 0* 8.1*   MCV 88.7   < > 88.5 94.1 88.0 87.3 89.1   .0   < > 228.0 255.0 277.0 312.0 325.0   INR 1.49*  --   --   --   --   --   --     < > = values in this interval not displayed.        Recent Labs   Lab 01/03/22  0436 01/04/22  0455 01/05/22  05 15 g, 15 g, Oral, Q15 Min PRN   Or  glucose-vitamin C (DEX-4) chewable tab 4 tablet, 4 tablet, Oral, Q15 Min PRN   Or  dextrose 50 % injection 50 mL, 50 mL, Intravenous, Q15 Min PRN   Or  glucose (DEX4) oral liquid 30 g, 30 g, Oral, Q15 Min PRN   Or  gluco

## 2022-01-07 NOTE — PROGRESS NOTES
INFECTIOUS DISEASE PROGRESS NOTE    Dong Cazares Patient Status:  Inpatient    1950 MRN [id number]   [facility] 6NE-A Attending Rosa Tinsley MD   Hosp Day # 7 PCP Melody Irby chewable tab 8 tablet, 8 tablet, Oral, Q15 Min PRN  •  melatonin tab 3 mg, 3 mg, Oral, Nightly PRN  •  polyethylene glycol (PEG 3350) (MIRALAX) powder packet 17 g, 17 g, Oral, Daily PRN  •  sennosides (SENOKOT) tab 17.2 mg, 17.2 mg, Oral, Nightly PRN  •  b 8. 0* 8.1*   ALB 1.9*  --  1.8*  --  1.6*      < > 136 136 135*   K 4.3   < > 4.3 4.4 4.1      < > 102 102 99   CO2 23.0   < > 28.0 27.0 29.0   ALKPHO 82  --  103  --  92   AST 14*  --  20  --  24   ALT 14*  --  12*  --  14*   BILT 1.2  --  1.0 transcribed by Technologist)     FINDINGS:  LUNGS:  Calcified granuloma in the right upper lobe is noted. VASCULATURE:  Thrombus cannot be excluded without intravenous contrast.  AVA:  No mass or adenopathy. MEDIASTINUM:  No mass or adenopathy.   CARDIAC: centimeters/second. There is slight decrease in the velocity at the distal anastomosis, presently 146, previously 205. No occlusion. No significant stenosis. All waveforms are biphasic, except for the left PTA which has triphasic waveforms.  OTHER:  None INDICATIONS:  chest tube medistinal drain  PATIENT STATED HISTORY: (As transcribed by Technologist)  Patient offered no additional history at this time. CONCLUSION:    Stable bilateral chest tubes.   No appreciable pneumothorax either side at t rule out pneumothorax bilateral  PATIENT STATED HISTORY: (As transcribed by Technologist)  Patient offered no additional history at this time. FINDINGS:  Bilateral chest tubes are in place. No significant pneumothorax identified.   Endoscope tip in Technologist)  Patient offered no additional history at this time. FINDINGS:  There has been interval removal of the patient's McMillan-Ramon catheter, with a persisting catheter sheath in place within the right IJ.   There has been interval removal of the bi CHEST AP PORTABLE  (CPT=71045)  TECHNIQUE:  AP chest radiograph was obtained.   COMPARISON:  [facility] , XR, XR CHEST AP PORTABLE  (CPT=71045), 12/14/2021, 2:13 PM.  INDICATIONS:  s/p surgery  PATIENT STATED HISTORY: (As transcribed by Technologist)  Patient o 12/14/2021 at 2:51 PM     Finalized by (CST): Corina Lake MD on 12/14/2021 at 2:52 PM         ASSESSMENT:  1. 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 S/p I&D 1/4  2. CAD sp CABG  3. Reactive leukocytosis, resolved  4. CKD  5.  Type 2 DM

## 2022-01-07 NOTE — PLAN OF CARE
Pt is AOx4, denies dyspnea. Tylenol prn for incisional pain. 1 small episode of emesis this am that came on suddenly while drinking miralax and resolved after the small emesis, denies abdominal pain or nausea now. Bowel sounds active. QID accucheck.   Keyshawn Tipton

## 2022-01-07 NOTE — PHYSICAL THERAPY NOTE
PHYSICAL THERAPY TREATMENT NOTE - INPATIENT    Room Number: 7039/4341-W     Session: 2    Number of Visits to Meet Established Goals: 3    Presenting Problem: Wound dehiscence s/p repair of fascial dehiscence  Co-Morbidities : CHF, CKD, DMII     History Goal #3 Patient is able to ambulate 150 feet with assist device: walker - rolling at assistance level: supervision      Goal #4 Pt will ascend/descend 1 flight of stairs with SBA.    Goal #5     Goal #6     Goal Comments: Goals established on 1/1/2022 1/ lateral drift. )    Skilled Therapy Provided: Pt education on sternal precautions during sit to stand, gait training and exercises. Bed Mobility: cues provided for log roll.    Rolling right: mod I   Rolling left: mod I     Supine<>Sit: mod I    Sit<>Berger

## 2022-01-07 NOTE — PROGRESS NOTES
[facility]     Hospitalist Progress Note     Radhika Sow Patient Status:  Inpatient    1950 MRN [id number]   [facility] 6NE-A Attending Odean Landau, MD   Hosp Day # 8  [facility]     Chief Complaint: incisional pain     Berger --  24  --    ALT 14*  --  12*  --   --  14*  --    BILT 1.2  --  1.0  --   --  0.6  --    TP 5.4*  --  4.9*  --   --  5.6*  --     < > = values in this interval not displayed.        Estimated Creatinine Clearance: 37 mL/min (A) (based on SCr of 1.72 mg/dL like Monday        Aki Robins MD     Supplementary Documentation:      Quality:  · DVT Prophylaxis: SCD  · CODE status: full  · Central line: NA  Will the patient be referred to TCC on discharge?: TBD  Estimated date of discharge: TBD  Discharge is dep

## 2022-01-08 LAB
ANION GAP SERPL CALC-SCNC: 5 MMOL/L (ref 0–18)
BUN BLD-MCNC: 32 MG/DL (ref 7–18)
CALCIUM BLD-MCNC: 8.4 MG/DL (ref 8.5–10.1)
CHLORIDE SERPL-SCNC: 104 MMOL/L (ref 98–112)
CO2 SERPL-SCNC: 30 MMOL/L (ref 21–32)
CREAT BLD-MCNC: 1.46 MG/DL
ERYTHROCYTE [DISTWIDTH] IN BLOOD BY AUTOMATED COUNT: 15.1 %
GLUCOSE BLD-MCNC: 252 MG/DL (ref 70–99)
GLUCOSE BLD-MCNC: 254 MG/DL (ref 70–99)
GLUCOSE BLD-MCNC: 84 MG/DL (ref 70–99)
GLUCOSE BLD-MCNC: 88 MG/DL (ref 70–99)
GLUCOSE BLD-MCNC: 94 MG/DL (ref 70–99)
HCT VFR BLD AUTO: 26 %
HGB BLD-MCNC: 8.1 G/DL
MCH RBC QN AUTO: 27.5 PG (ref 26–34)
MCHC RBC AUTO-ENTMCNC: 31.2 G/DL (ref 31–37)
MCV RBC AUTO: 88.1 FL
OSMOLALITY SERPL CALC.SUM OF ELEC: 294 MOSM/KG (ref 275–295)
PLATELET # BLD AUTO: 325 10(3)UL (ref 150–450)
POTASSIUM SERPL-SCNC: 3.9 MMOL/L (ref 3.5–5.1)
RBC # BLD AUTO: 2.95 X10(6)UL
SODIUM SERPL-SCNC: 139 MMOL/L (ref 136–145)
WBC # BLD AUTO: 8.6 X10(3) UL (ref 4–11)

## 2022-01-08 PROCEDURE — 99232 SBSQ HOSP IP/OBS MODERATE 35: CPT | Performed by: HOSPITALIST

## 2022-01-08 NOTE — CM/SW NOTE
Order noted for follow up at wound care clinic. Appointments will need to be made on Monday for this. CM/SW to follow up.      Ngozi Ackeramn, RN, BSN   [phone]

## 2022-01-08 NOTE — PROGRESS NOTES
Progress Note  Kayli Gaffney Patient Status:  Inpatient    1950 MRN [id number]   [facility] 8NE-A Attending Cindy Nova MD   Hosp Day # 9 PCP LUIS ALFREDO SHAFFER     Subjective:  Wound Vac intact.  No complaints of chest pain or SOB    Ob Units Subcutaneous BID AC   • metoprolol tartrate  25 mg Oral 2x Daily(Beta Blocker)   • docusate sodium  100 mg Oral BID   • [Held by provider] furosemide  40 mg Intravenous BID (Diuretic)   • meropenem  500 mg Intravenous q12h   • aspirin  81 mg Oral Angela Valdez

## 2022-01-08 NOTE — PLAN OF CARE
Assumed care of patient 1930 sitting up in chair. AO x4. NSR on tele monitor. O2 sat adequate on room air. Denies chest pain and shortness of breath. Wound vac to mid sternal incision CDI, with serosanguineous output.  Ambulating in hallway with SBA and fro emergency measures for life threatening arrhythmias  - Monitor electrolytes and administer replacement therapy as ordered  Outcome: Progressing     Problem: Safety Risk - Non-Violent Restraints  Goal: Patient will remain free from self-harm  Description: I

## 2022-01-08 NOTE — PLAN OF CARE
Patient sitting in chair, denies chest pain, shortness of breath and dizziness. Normal sinus rhythm on cardiac monitor. No edema noted. Wound vac to midsternal chest. Patient up with stand by assist. Call light with in reach, fall risk reviewed.  All questi

## 2022-01-08 NOTE — PROGRESS NOTES
[facility]   [facility] Progress Note    Dong Cazares Patient Status:  Inpatient    1950 MRN [id number]   [facility] 8NE-A Attending Janie Mendoza MD   Hosp Day # 9 PCP LUIS ALFREDO SHAFFER     Subjective:  Up to chair. Feeling good.  Denies PRN  acetaminophen (TYLENOL EXTRA STRENGTH) tab 500 mg, 500 mg, Oral, Q6H PRN   Or  acetaminophen (TYLENOL EXTRA STRENGTH) tab 1,000 mg, 1,000 mg, Oral, Q6H PRN  Meropenem (MERREM) 500 mg in sodium chloride 0.9% 100 mL IVPB-MBP, 500 mg, Intravenous, q12h today  Extremities: Warm, dry, no edema, SOARES  Pulses: Palpable  Incisions: sternal wound vac in place, C/D/I       Assessment/Plan:  Patient Active Problem List:     Diabetic foot infection ([facility] [address]     Diabetes mellitus type 2 in nonobese (HCC)     Chronic k

## 2022-01-08 NOTE — PROGRESS NOTES
[facility]     Hospitalist Progress Note     Shira Cuello Patient Status:  Inpatient    1950 MRN [id number]   [facility] 6NE-A Attending Rocío Allen MD   Hosp Day # 9  [facility]     Chief Complaint: incisional pain     Berger Results    COVID-19  Lab Results   Component Value Date    COVID19 Not Detected 12/31/2021    COVID19 Not Detected 12/18/2021    COVID19 Not Detected 12/10/2021       Pro-Calcitonin  No results for input(s): PCT in the last 168 hours.     Cardiac  No result

## 2022-01-09 LAB
ANION GAP SERPL CALC-SCNC: 4 MMOL/L (ref 0–18)
BUN BLD-MCNC: 29 MG/DL (ref 7–18)
CALCIUM BLD-MCNC: 8.2 MG/DL (ref 8.5–10.1)
CHLORIDE SERPL-SCNC: 105 MMOL/L (ref 98–112)
CO2 SERPL-SCNC: 30 MMOL/L (ref 21–32)
CREAT BLD-MCNC: 1.27 MG/DL
GLUCOSE BLD-MCNC: 109 MG/DL (ref 70–99)
GLUCOSE BLD-MCNC: 231 MG/DL (ref 70–99)
GLUCOSE BLD-MCNC: 259 MG/DL (ref 70–99)
GLUCOSE BLD-MCNC: 341 MG/DL (ref 70–99)
GLUCOSE BLD-MCNC: 89 MG/DL (ref 70–99)
OSMOLALITY SERPL CALC.SUM OF ELEC: 293 MOSM/KG (ref 275–295)
POTASSIUM SERPL-SCNC: 4.5 MMOL/L (ref 3.5–5.1)
SODIUM SERPL-SCNC: 139 MMOL/L (ref 136–145)

## 2022-01-09 PROCEDURE — 99232 SBSQ HOSP IP/OBS MODERATE 35: CPT | Performed by: HOSPITALIST

## 2022-01-09 RX ORDER — FUROSEMIDE 10 MG/ML
40 INJECTION INTRAMUSCULAR; INTRAVENOUS ONCE
Status: COMPLETED | OUTPATIENT
Start: 2022-01-09 | End: 2022-01-09

## 2022-01-09 NOTE — PROGRESS NOTES
[facility]   [facility] Progress Note    Jorge Alberto Newer Patient Status:  Inpatient    1950 MRN [id number]   [facility] 8NE-A Attending Margy Cahnd MD   Hosp Day # 10 PCP LUIS ALFREDO SHAFFER     Subjective:  Up in chair, doing well, shylaies p PRN  acetaminophen (TYLENOL EXTRA STRENGTH) tab 500 mg, 500 mg, Oral, Q6H PRN   Or  acetaminophen (TYLENOL EXTRA STRENGTH) tab 1,000 mg, 1,000 mg, Oral, Q6H PRN  Meropenem (MERREM) 500 mg in sodium chloride 0.9% 100 mL IVPB-MBP, 500 mg, Intravenous, q12h Sternal wound vac dressing       Assessment/Plan:  Patient Active Problem List:     Diabetic foot infection ([facility] [address]     Diabetes mellitus type 2 in nonobese [facility])     Chronic kidney disease     Osteomyelitis ([facility] [address]     PAD (peripheral artery disease) ([facility] [address]

## 2022-01-09 NOTE — PROGRESS NOTES
Progress Note  Marilu James Patient Status:  Inpatient    1950 MRN [id number]   [facility] 8NE-A Attending Vipin Zamarripa MD   Hosp Day # 10 PCP LUIS ALFREDO SHAFFER     Subjective:  Up walking in the halls.  No chest pain or SOB    Objectiv deficits  Chest; midsternal incision clean and dry    Medications:    • furosemide  40 mg Intravenous Once   • Insulin Aspart Prot & Aspart 70/30  13 Units Subcutaneous BID AC   • metoprolol tartrate  25 mg Oral 2x Daily(Beta Blocker)   • docusate sodium

## 2022-01-09 NOTE — PLAN OF CARE
Assumed care of patient 1930 sitting up in chair. AO x4. NSR on tele monitor. O2 sat adequate on room air. Denies chest pain and shortness of breath. Wound vac dressing intact. Plan of care updated with patient. Bed and chair locked.  Call light and persona replacement therapy as ordered  Outcome: Progressing     Problem: Safety Risk - Non-Violent Restraints  Goal: Patient will remain free from self-harm  Description: INTERVENTIONS:  - Apply the least restrictive restraint to prevent harm  - Notify patient an

## 2022-01-09 NOTE — PROGRESS NOTES
[facility]     Hospitalist Progress Note     Shirafelipe Cuello Patient Status:  Inpatient    1950 MRN [id number]   [facility] 6NE-A Attending Rocío Allen MD   Hosp Day # 8  [facility]     Chief Complaint: incisional pain     S 4.9*  --  5.6*  --   --   --   --     < > = values in this interval not displayed. Estimated Creatinine Clearance: 50.1 mL/min (based on SCr of 1.27 mg/dL). No results for input(s): PTP, INR in the last 168 hours.          COVID-19 Lab Results    C      Federico Garza MD     Supplementary Documentation:      Quality:  · DVT Prophylaxis: SCD  · CODE status: full  · Central line: NA  Will the patient be referred to TCC on discharge?: TBD  Estimated date of discharge: 24 hours   Discharge is dependent

## 2022-01-09 NOTE — PLAN OF CARE
Pt rec'd awake and alert. No distress or discomfort assessd. Wound vac to midsternal wound in place, low output. Pt voiding per urinal after receiving LAsix this am.  Pt up in chair and ambulated in silva with steady gait and SBA. Vasiliy Weathers CAll light in reach.  Telem to prevent harm  - Notify patient and family of reasons restraints applied  - Assess for any contributing factors to confusion (electrolyte disturbances, delirium, medications)  - Discontinue any unnecessary medical devices as soon as possible  - Assess th

## 2022-01-10 VITALS
OXYGEN SATURATION: 94 % | WEIGHT: 165.63 LBS | DIASTOLIC BLOOD PRESSURE: 51 MMHG | TEMPERATURE: 99 F | BODY MASS INDEX: 25 KG/M2 | HEART RATE: 64 BPM | SYSTOLIC BLOOD PRESSURE: 117 MMHG | RESPIRATION RATE: 18 BRPM

## 2022-01-10 LAB
ANION GAP SERPL CALC-SCNC: 4 MMOL/L (ref 0–18)
BUN BLD-MCNC: 26 MG/DL (ref 7–18)
CALCIUM BLD-MCNC: 8.1 MG/DL (ref 8.5–10.1)
CHLORIDE SERPL-SCNC: 103 MMOL/L (ref 98–112)
CO2 SERPL-SCNC: 30 MMOL/L (ref 21–32)
CREAT BLD-MCNC: 1.45 MG/DL
GLUCOSE BLD-MCNC: 161 MG/DL (ref 70–99)
GLUCOSE BLD-MCNC: 177 MG/DL (ref 70–99)
GLUCOSE BLD-MCNC: 194 MG/DL (ref 70–99)
GLUCOSE BLD-MCNC: 201 MG/DL (ref 70–99)
OSMOLALITY SERPL CALC.SUM OF ELEC: 293 MOSM/KG (ref 275–295)
POTASSIUM SERPL-SCNC: 4.8 MMOL/L (ref 3.5–5.1)
SODIUM SERPL-SCNC: 137 MMOL/L (ref 136–145)

## 2022-01-10 PROCEDURE — 99239 HOSP IP/OBS DSCHRG MGMT >30: CPT | Performed by: HOSPITALIST

## 2022-01-10 RX ORDER — FUROSEMIDE 40 MG/1
40 TABLET ORAL DAILY
Status: DISCONTINUED | OUTPATIENT
Start: 2022-01-10 | End: 2022-01-10

## 2022-01-10 RX ORDER — FUROSEMIDE 40 MG/1
40 TABLET ORAL DAILY
Qty: 30 TABLET | Refills: 3 | Status: SHIPPED | OUTPATIENT
Start: 2022-01-10

## 2022-01-10 RX ORDER — METOPROLOL SUCCINATE 25 MG/1
25 TABLET, EXTENDED RELEASE ORAL EVERY EVENING
Qty: 30 TABLET | Refills: 3 | Status: SHIPPED | OUTPATIENT
Start: 2022-01-10

## 2022-01-10 RX ORDER — METOPROLOL TARTRATE 50 MG/1
25 TABLET, FILM COATED ORAL
Qty: 60 TABLET | Refills: 3 | Status: SHIPPED | COMMUNITY
Start: 2022-01-10 | End: 2022-01-10

## 2022-01-10 RX ORDER — ERTAPENEM 1 G/1
1 INJECTION, POWDER, LYOPHILIZED, FOR SOLUTION INTRAMUSCULAR; INTRAVENOUS DAILY
Qty: 28 EACH | Refills: 0 | Status: SHIPPED | OUTPATIENT
Start: 2022-01-10 | End: 2022-02-07

## 2022-01-10 RX ORDER — LISINOPRIL 2.5 MG/1
2.5 TABLET ORAL DAILY
Qty: 30 TABLET | Refills: 0 | Status: SHIPPED | OUTPATIENT
Start: 2022-01-10

## 2022-01-10 NOTE — WOUND PROGRESS NOTE
[facility]  Report of Inpatient Wound Care Consultation    Les Sidle Patient Status:  Inpatient    1950 MRN [id number]   [facility] 8NE-A Attending Jazlyn Scott MD   Hosp Day # 11 [facility]     Reason for Consultation --    ALB 1.8*  --  1.6*  --   --   --   --   --   --   --   --   --   --   --   --    TP 4.9*  --  5.6*  --   --   --   --   --   --   --   --   --   --   --   --    PGLU  --    < >  --    < >  --    < >  --    < >  --    < >   < >  --  201* 161* 194*

## 2022-01-10 NOTE — PROGRESS NOTES
[facility]  Progress Note    Kayli Gaffney Patient Status:  Inpatient    1950 MRN [id number]   [facility] 8NE-A Attending Cindy Nova MD   Hosp Day # 11 PCP LUIS ALFREDO SHAFFER     Subjective:  Pt feeling well today.  He has no pain, b source Oral, resp. rate 16, weight 165 lb 9.6 oz (75.1 kg), SpO2 94 %.   General: A&O X 3, NAD, afeb, VSS  Neck: no JVD  Lungs: CTAB  Heart: RRR  Abdomen: soft, BS present  Extremities: trace edema BLE  Skin: warm/dry  Neurological: grossly intact    Assess

## 2022-01-10 NOTE — PROGRESS NOTES
[facility]     Hospitalist Progress Note     Colin Cantor Patient Status:  Inpatient    1950 MRN [id number]   [facility] 6NE-A Attending Radha Vega MD   Hosp Day # 6  [facility]     Chief Complaint: incisional pain     S Clearance: 43.9 mL/min (A) (based on SCr of 1.45 mg/dL (H)). No results for input(s): PTP, INR in the last 168 hours.          COVID-19 Lab Results    COVID-19  Lab Results   Component Value Date    COVID19 Not Detected 12/31/2021    COVID19 Not Detected

## 2022-01-10 NOTE — PLAN OF CARE
Alert and oriented x4 on tele monitor hr 70's sinus rhythm. Wound vac in placed and dressing c/d/i. Denies any pain. Updated w/ poc and verbalized understanding. All needs attended and will continue to monitor. Call light within reach.   Problem: Patient/Fa Safety Risk - Non-Violent Restraints  Goal: Patient will remain free from self-harm  Description: INTERVENTIONS:  - Apply the least restrictive restraint to prevent harm  - Notify patient and family of reasons restraints applied  - Assess for any contribut

## 2022-01-10 NOTE — PLAN OF CARE
Explained discharge instructions including medications and follow-ups to the patient, verbalized understanding, IV removed, tele monitor discontinued, will be transported via wheelchair.     Problem: Patient/Family Goals  Goal: Patient/Family Long Term Goal effectiveness of antiarrhythmic and heart rate control medications as ordered  - Initiate emergency measures for life threatening arrhythmias  - Monitor electrolytes and administer replacement therapy as ordered  1/10/2022 1153 by Ana Boothe

## 2022-01-10 NOTE — DISCHARGE SUMMARY
[facility] HOSPITALIST  DISCHARGE SUMMARY     Paula Busch Patient Status:  Inpatient    1950 MRN [id number]   [facility] 8NE-A Attending No att. providers found   Hosp Day # 11 PCP Sherryle Charm     Date of Admission: 2021  Date of D and patient underwent subsequent surgery on 1/4 including incision and drainage and irrigation of the sternal wound. Patient had removal of the Pleurx catheter. Wound VAC was placed and patient started to defervesce.   Patient was monitored closely over t LIPITOR      Take 1 tablet (40 mg total) by mouth nightly.    Quantity: 30 tablet  Refills: 3     Insulin Aspart Protamine & Aspart 70/30 (70-30) 100 UNIT/ML Susp  Commonly known as: NovoLOG MIX 70/30      Inject 7 Units into the skin 2 (two) times daily be

## 2022-01-10 NOTE — PROGRESS NOTES
[facility] Cardiology Progress Note    Penny Mckeon Patient Status:  Inpatient    1950 MRN [id number]   [facility] 8NE-A Attending Patricia Curiel MD   Hosp Day # 11 PCP LUIS ALFREDO SHAFFER     Subjective:  No acute events overnight 27.5   MCHC 31.5 30.9* 31.2   RDW 15.6 15.5 15.1   WBC 9.5 7.9 8.6   .0 325.0 325.0                 Physical Exam:       Constitutional: He is oriented to person, place, and time. No distress. Neck: JVD present.    Cardiovascular: Normal rate and r and add aldactone  • Follow up with Dr. Starla Macias 1 week in office     VIKTORIA Doyle  1/10/2022  9:52 AM

## 2022-01-10 NOTE — PLAN OF CARE
Assumed pt care at 0730. A&Ox4, glasses. RA/, IS 1250. denies chest pain/SOB, lung sounds clear, VSS, NSR on tele. Voids. Up with SB/walker. Edema to BLE. Misternal incision wound vac attached. RLE steristrips, approximated, painted with betadine.  POC h Initiate emergency measures for life threatening arrhythmias  - Monitor electrolytes and administer replacement therapy as ordered  Outcome: Progressing     Problem: Safety Risk - Non-Violent Restraints  Goal: Patient will remain free from self-harm  Descr

## 2022-01-10 NOTE — CM/SW NOTE
Spoke with francie from [facility]--she confirmed with patient's spouse that they will attend the teach and train abx infusion session in the [facility] office tomorrow, 1/11/22 @ 1300 in the [place] office ([address] [place] [address], [address]).   If unable to do, she un

## 2022-01-12 ENCOUNTER — OFFICE VISIT (OUTPATIENT)
Dept: WOUND CARE | Facility: HOSPITAL | Age: 72
End: 2022-01-12
Attending: NURSE PRACTITIONER
Payer: MEDICARE

## 2022-01-12 VITALS
BODY MASS INDEX: 25.01 KG/M2 | SYSTOLIC BLOOD PRESSURE: 127 MMHG | TEMPERATURE: 99 F | WEIGHT: 165 LBS | RESPIRATION RATE: 14 BRPM | HEART RATE: 80 BPM | HEIGHT: 68 IN | DIASTOLIC BLOOD PRESSURE: 64 MMHG

## 2022-01-12 DIAGNOSIS — T81.89XS PROTRUDING STERNAL WIRES, SEQUELA: ICD-10-CM

## 2022-01-12 DIAGNOSIS — A48.8 SERRATIA MARCESCENS INFECTION: ICD-10-CM

## 2022-01-12 DIAGNOSIS — T81.31XD DISRUPTION OF EXTERNAL OPERATION (SURGICAL) WOUND, NOT ELSEWHERE CLASSIFIED, SUBSEQUENT ENCOUNTER: Primary | ICD-10-CM

## 2022-01-12 LAB — GLUCOSE BLD-MCNC: 169 MG/DL (ref 70–99)

## 2022-01-12 PROCEDURE — 99215 OFFICE O/P EST HI 40 MIN: CPT | Performed by: NURSE PRACTITIONER

## 2022-01-12 NOTE — PROGRESS NOTES
.Weekly Wound Education Note    Teaching Provided To: Patient; Family  Training Topics: Dressing; Discharge instructions; Negative presssure therapy  Training Method: Explain/Verbal;Written  Training Response: Patient responds and understands; Reinforcement ne

## 2022-01-12 NOTE — PATIENT INSTRUCTIONS
Please return on:  Friday and Monday for RN visits for wound vac  Wednesday with Shonna-I will see if rene Dozier can come down to wound center to visualize wound    Patient discharge and wound care instructions  Colin Cantor  1/12/2022    You may [phone] If after regular business hours, please call your family doctor or local emergency room. The treatment plan has been discussed at length between you and your provider. Follow all instructions carefully, it is very important.  If you do not foll

## 2022-01-12 NOTE — PROGRESS NOTES
CHIEF COMPLAINT:   Patient presents with:  Wound Care: Initial visit. Had bypass surgery a few weeks ago that did not close. Has had wound vac at 125mmHg.     HPI:   Information obtained from PATIENT, SPOUSE AND CHART  1- INITIAL Patient is known to with CBC and BMP., Disp: 28 each, Rfl: 0  •  Insulin Aspart Protamine & Aspart 70/30 (70-30) 100 UNIT/ML Subcutaneous Suspension, Inject 7 Units into the skin 2 (two) times daily before meals. , Disp: 10 mL, Rfl: 1  •  atorvastatin 40 MG Oral Tab, Take 1 ta Pulse Regular and wnl for patient. Respirations easy and unlabored. Temperature wnl. Fragile and thin. Appearance neat and clean. Appears in no acute distress. Well nourished and well developed.     Respiratory: Respiratory effort is easy and symmetric bila Wound Volume (cm^3) 0.375 cm^3   Margins Well-defined edges   Non-staged Wound Description Full thickness   Anamaria-wound Assessment Edema   Wound Granulation Tissue Firm;Pink   Wound Bed Granulation (%) 10 %   Wound Bed Epithelium (%) 60 %   Wound Bed Slou to communicate with each other. These documents include medical language and terminology, abbreviations, and treatment information that may sound technical and at times possibly unfamiliar. In addition, at times, the verbiage may appear blunt or direct.  Justin Bird A: Dark green, leafy vegetables, orange or yellow vegetables, cantaloupe, fortified dairy products, liver, fortified cereals  • Zinc: Fortified cereals, red meats, seafood  • Continuesupplementing with Javi by [facility] (These are essential branch chain

## 2022-01-14 ENCOUNTER — OFFICE VISIT (OUTPATIENT)
Dept: WOUND CARE | Facility: HOSPITAL | Age: 72
End: 2022-01-14
Attending: NURSE PRACTITIONER
Payer: MEDICARE

## 2022-01-14 VITALS
HEART RATE: 80 BPM | SYSTOLIC BLOOD PRESSURE: 110 MMHG | DIASTOLIC BLOOD PRESSURE: 67 MMHG | RESPIRATION RATE: 14 BRPM | TEMPERATURE: 98 F

## 2022-01-14 DIAGNOSIS — T81.30XA WOUND DEHISCENCE: ICD-10-CM

## 2022-01-14 LAB — GLUCOSE BLD-MCNC: 132 MG/DL (ref 70–99)

## 2022-01-14 PROCEDURE — 97605 NEG PRS WND THER DME<=50SQCM: CPT

## 2022-01-14 PROCEDURE — 82962 GLUCOSE BLOOD TEST: CPT

## 2022-01-14 NOTE — PROGRESS NOTES
Faith Pagan is an 66year old male. Patient presents with:  Wound Care: Pt here for nurse visit. He states no new concerns or pain.       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

## 2022-01-17 ENCOUNTER — OFFICE VISIT (OUTPATIENT)
Dept: WOUND CARE | Facility: HOSPITAL | Age: 72
End: 2022-01-17
Attending: NURSE PRACTITIONER
Payer: MEDICARE

## 2022-01-17 VITALS
SYSTOLIC BLOOD PRESSURE: 165 MMHG | RESPIRATION RATE: 16 BRPM | DIASTOLIC BLOOD PRESSURE: 90 MMHG | HEART RATE: 86 BPM | TEMPERATURE: 99 F

## 2022-01-17 DIAGNOSIS — T81.30XA WOUND DEHISCENCE: Primary | ICD-10-CM

## 2022-01-17 LAB — GLUCOSE BLD-MCNC: 163 MG/DL (ref 70–99)

## 2022-01-17 PROCEDURE — 82962 GLUCOSE BLOOD TEST: CPT

## 2022-01-17 PROCEDURE — 97605 NEG PRS WND THER DME<=50SQCM: CPT

## 2022-01-17 PROCEDURE — 99214 OFFICE O/P EST MOD 30 MIN: CPT

## 2022-01-19 ENCOUNTER — OFFICE VISIT (OUTPATIENT)
Dept: WOUND CARE | Facility: HOSPITAL | Age: 72
End: 2022-01-19
Attending: NURSE PRACTITIONER
Payer: MEDICARE

## 2022-01-19 VITALS
TEMPERATURE: 98 F | RESPIRATION RATE: 16 BRPM | DIASTOLIC BLOOD PRESSURE: 70 MMHG | HEART RATE: 82 BPM | SYSTOLIC BLOOD PRESSURE: 132 MMHG

## 2022-01-19 DIAGNOSIS — T81.89XS PROTRUDING STERNAL WIRES, SEQUELA: ICD-10-CM

## 2022-01-19 DIAGNOSIS — T81.31XD DISRUPTION OF EXTERNAL OPERATION (SURGICAL) WOUND, NOT ELSEWHERE CLASSIFIED, SUBSEQUENT ENCOUNTER: ICD-10-CM

## 2022-01-19 DIAGNOSIS — E11.59 TYPE 2 DIABETES MELLITUS WITH OTHER CIRCULATORY COMPLICATIONS (HCC): ICD-10-CM

## 2022-01-19 DIAGNOSIS — T81.30XA WOUND DEHISCENCE: Primary | ICD-10-CM

## 2022-01-19 LAB — GLUCOSE BLD-MCNC: 166 MG/DL (ref 70–99)

## 2022-01-19 PROCEDURE — 99213 OFFICE O/P EST LOW 20 MIN: CPT | Performed by: NURSE PRACTITIONER

## 2022-01-19 NOTE — PROGRESS NOTES
.Weekly Wound Education Note    Teaching Provided To: Family; Patient  Training Topics: Discharge instructions;Dressing;Negative presssure therapy  Training Method: Explain/Verbal;Written  Training Response: Patient responds and understands; Reinforcement ne

## 2022-01-19 NOTE — PROGRESS NOTES
CHIEF COMPLAINT:   Patient presents with:  Wound Care: Patient is here for a wound care follow up. He denies any current pain to the wound.      HPI:   Information obtained from PATIENT, [facility] CHART  1- INITIAL Patient is known to me from previo daily., Disp: 30 tablet, Rfl: 3  •  lisinopril 2.5 MG Oral Tab, Take 1 tablet (2.5 mg total) by mouth daily. , Disp: 30 tablet, Rfl: 0  •  metoprolol succinate 25 MG Oral Tablet 24 Hr, Take 1 tablet (25 mg total) by mouth every evening., Disp: 30 tablet, Rf distress. Well nourished and well developed. Musculoskeletal:  Gait and station stable with assistance of walker  Integumentary:  refer to wound characteristics and images   Psychiatric:  Judgment and insight intact. Alert and oriented times 3.  No evide Wound Depth (cm) 0.1 cm 0.1 cm   Wound Volume (cm^3) 0.375 cm^3 0. 21 cm^3   Wound Healing % — 44   Margins Well-defined edges Well-defined edges   Non-staged Wound Description Full thickness Full thickness   Anamaria-wound Assessment Edema Edema   Wound [place] Cagey verbiage may appear blunt or direct. These documents are one tool providers use to communicate relevant information and clinical opinions of the care providers in a way that allows common understanding of the clinical context.    I spent 25 minutes with the chain amino acids that help with tissue building and wound healing) and take 2 packets/day. you can order online at [facility] or [facility]    If your blood sugar is consistently elevated your body can't heal and can't fight infection.     Concerns:  Signs of infe

## 2022-01-19 NOTE — PATIENT INSTRUCTIONS
Please return on:  Friday and Monday for RN visits for wound vac  Wednesday with [facility]    Patient discharge and wound care instructions  Kaykay Santizo  1/19/2022    You may shower with protection of the wound (ie a cast cover or similar).   Cleansing/dressi emergency room. The treatment plan has been discussed at length between you and your provider. Follow all instructions carefully, it is very important.  If you do not follow all instructions you are at risk of your wound not healing, infection, possible los

## 2022-01-21 ENCOUNTER — OFFICE VISIT (OUTPATIENT)
Dept: WOUND CARE | Facility: HOSPITAL | Age: 72
End: 2022-01-21
Attending: NURSE PRACTITIONER
Payer: MEDICARE

## 2022-01-21 VITALS
DIASTOLIC BLOOD PRESSURE: 71 MMHG | RESPIRATION RATE: 18 BRPM | TEMPERATURE: 98 F | SYSTOLIC BLOOD PRESSURE: 137 MMHG | HEART RATE: 81 BPM

## 2022-01-21 DIAGNOSIS — T81.30XA WOUND DEHISCENCE: Primary | ICD-10-CM

## 2022-01-21 LAB — GLUCOSE BLD-MCNC: 113 MG/DL (ref 70–99)

## 2022-01-21 PROCEDURE — 97605 NEG PRS WND THER DME<=50SQCM: CPT

## 2022-01-21 PROCEDURE — 82962 GLUCOSE BLOOD TEST: CPT

## 2022-01-21 NOTE — PROGRESS NOTES
Patient presents with:  Wound Care: Pt here for nurse visit. He states no new concerns or pain. Current Outpatient Medications:   •  furosemide 40 MG Oral Tab, Take 1 tablet (40 mg total) by mouth daily. , Disp: 30 tablet, Rfl: 3  •  lisinopril 2.5 14.52 cm^3   Wound Healing % — 73   Margins Well-defined edges Well-defined edges   Non-staged Wound Description Full thickness Full thickness   Anamaria-wound Assessment Edema Edema   Wound Granulation Tissue Pink;Red;Firm Firm;Pink   Wound Bed Granulation (% 125   Drainage Description Serous; Yellow Serosanguineous   Canister Changed Yes No       No Linked orders to display          ASSESSMENT AND PLAN:        Risks, benefits, and alternatives of current treatment plan discussed in detail.   Questions and concer

## 2022-01-24 ENCOUNTER — OFFICE VISIT (OUTPATIENT)
Dept: WOUND CARE | Facility: HOSPITAL | Age: 72
End: 2022-01-24
Attending: NURSE PRACTITIONER
Payer: MEDICARE

## 2022-01-24 VITALS
DIASTOLIC BLOOD PRESSURE: 80 MMHG | SYSTOLIC BLOOD PRESSURE: 154 MMHG | RESPIRATION RATE: 18 BRPM | TEMPERATURE: 98 F | HEART RATE: 78 BPM

## 2022-01-24 DIAGNOSIS — T81.31XD DISRUPTION OF EXTERNAL OPERATION (SURGICAL) WOUND, NOT ELSEWHERE CLASSIFIED, SUBSEQUENT ENCOUNTER: Primary | ICD-10-CM

## 2022-01-24 DIAGNOSIS — T81.30XA WOUND DEHISCENCE: ICD-10-CM

## 2022-01-24 LAB — GLUCOSE BLD-MCNC: 317 MG/DL (ref 70–99)

## 2022-01-24 PROCEDURE — 97605 NEG PRS WND THER DME<=50SQCM: CPT

## 2022-01-24 PROCEDURE — 82962 GLUCOSE BLOOD TEST: CPT

## 2022-01-24 NOTE — PROGRESS NOTES
Patient presents with:  Wound Care: Patient here for nurse visit. He states no new concerns. Current Outpatient Medications:   •  furosemide 40 MG Oral Tab, Take 1 tablet (40 mg total) by mouth daily. , Disp: 30 tablet, Rfl: 3  •  lisinopril 2.5 MG 10.208 cm^3   Wound Healing % — 81   Margins Well-defined edges Well-defined edges   Non-staged Wound Description Full thickness Full thickness   Anamaria-wound Assessment Edema Edema   Wound Granulation Tissue Pink;Red;Firm Pink;Firm   Wound Bed Granulation ( (mmHg) 125 125   Drainage Description Serous; Yellow Serosanguineous   Canister Changed Yes No       No Linked orders to display          ASSESSMENT AND PLAN:        Risks, benefits, and alternatives of current treatment plan discussed in detail.   Questions

## 2022-01-26 ENCOUNTER — OFFICE VISIT (OUTPATIENT)
Dept: WOUND CARE | Facility: HOSPITAL | Age: 72
End: 2022-01-26
Attending: NURSE PRACTITIONER
Payer: MEDICARE

## 2022-01-26 VITALS
DIASTOLIC BLOOD PRESSURE: 82 MMHG | HEART RATE: 75 BPM | TEMPERATURE: 98 F | RESPIRATION RATE: 14 BRPM | SYSTOLIC BLOOD PRESSURE: 156 MMHG

## 2022-01-26 DIAGNOSIS — E11.59 TYPE 2 DIABETES MELLITUS WITH OTHER CIRCULATORY COMPLICATIONS (HCC): ICD-10-CM

## 2022-01-26 DIAGNOSIS — T81.31XD DISRUPTION OF EXTERNAL OPERATION (SURGICAL) WOUND, NOT ELSEWHERE CLASSIFIED, SUBSEQUENT ENCOUNTER: Primary | ICD-10-CM

## 2022-01-26 DIAGNOSIS — T81.89XS PROTRUDING STERNAL WIRES, SEQUELA: ICD-10-CM

## 2022-01-26 LAB — GLUCOSE BLD-MCNC: 225 MG/DL (ref 70–99)

## 2022-01-26 PROCEDURE — 99214 OFFICE O/P EST MOD 30 MIN: CPT | Performed by: NURSE PRACTITIONER

## 2022-01-26 NOTE — PROGRESS NOTES
.Weekly Wound Education Note    Teaching Provided To: Patient; Family  Training Topics: Dressing; Discharge instructions; Negative presssure therapy  Training Method: Explain/Verbal;Written  Training Response: Patient responds and understands             Endo

## 2022-01-26 NOTE — PROGRESS NOTES
CHIEF COMPLAINT:   Patient presents with:  Wound Care: Patient is here for a wound care follow up. He denies any current pain.     HPI:   Information obtained from PATIENT, [facility] CHART  1- INITIAL Patient is known to me from previous admissions feb 7.  Cheryl Bermudez pa is also here at bedside. The wire is no longer visible, it can be \"palpated\" under the granulation.   I d/w pa that we can continue to see if we get granulation growth over the wire, but would not want to dc the picc line u (L) 01/06/2022    A1C 8.0 (H) 12/13/2021     POC Glucose   Date Value Ref Range Status   01/26/2022 225 (H) 70 - 99 mg/dL Final   01/24/2022 317 (H) 70 - 99 mg/dL Final   01/21/2022 113 (H) 70 - 99 mg/dL Final     Vital signs reviewed. Appears stated age, w Date First Assessed/Time First Assessed: 01/12/22 0808    Wound Number (Wound Clinic Only): #2 Lower Chest  Primary Wound Type: Old surgical  Location: Sternum  Wound Location Orientation: Lower      Assessments 1/12/2022  8:13 AM 1/26/2022 10:41 AM   Wo plan.      NOTE TO PATIENT: The [facility] makes clinical notes like these available to patients in the interest of transparency. Clinical notes are medical documents used by physicians and care providers to communicate with each other.  These d potatoes, spinach, broccoli, cauliflower, [place] sprouts, cabbage  • Vitamin A: Dark green, leafy vegetables, orange or yellow vegetables, cantaloupe, fortified dairy products, liver, fortified cereals  • Zinc: Fortified cereals, red meats, seafood  • Co

## 2022-01-26 NOTE — PATIENT INSTRUCTIONS
Please return on: Friday and Monday for RN visits for wound vac  Wednesday with [facility]    Patient discharge and wound care instructions  Kaykay Santizo  1/26/2022    You may shower with protection of the wound (ie a cast cover or similar).   Cleansing/dressin discussed at length between you and your provider. Follow all instructions carefully, it is very important. If you do not follow all instructions you are at risk of your wound not healing, infection, possible loss of limb and even loss of life.

## 2022-01-28 ENCOUNTER — OFFICE VISIT (OUTPATIENT)
Dept: WOUND CARE | Facility: HOSPITAL | Age: 72
End: 2022-01-28
Attending: NURSE PRACTITIONER
Payer: MEDICARE

## 2022-01-28 VITALS
SYSTOLIC BLOOD PRESSURE: 128 MMHG | RESPIRATION RATE: 14 BRPM | HEART RATE: 79 BPM | DIASTOLIC BLOOD PRESSURE: 76 MMHG | TEMPERATURE: 98 F

## 2022-01-28 DIAGNOSIS — T81.30XA WOUND DEHISCENCE: ICD-10-CM

## 2022-01-28 PROCEDURE — 97605 NEG PRS WND THER DME<=50SQCM: CPT

## 2022-01-28 NOTE — PROGRESS NOTES
Ezequiel Melendez is an 66year old male. Patient presents with:  Wound Care: Patients is here for a nurse visit.  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

## 2022-01-31 ENCOUNTER — OFFICE VISIT (OUTPATIENT)
Dept: WOUND CARE | Facility: HOSPITAL | Age: 72
End: 2022-01-31
Attending: NURSE PRACTITIONER
Payer: MEDICARE

## 2022-01-31 VITALS
DIASTOLIC BLOOD PRESSURE: 69 MMHG | SYSTOLIC BLOOD PRESSURE: 125 MMHG | RESPIRATION RATE: 14 BRPM | HEART RATE: 82 BPM | TEMPERATURE: 99 F

## 2022-01-31 DIAGNOSIS — T81.31XD DISRUPTION OF EXTERNAL OPERATION (SURGICAL) WOUND, NOT ELSEWHERE CLASSIFIED, SUBSEQUENT ENCOUNTER: Primary | ICD-10-CM

## 2022-01-31 LAB — GLUCOSE BLD-MCNC: 83 MG/DL (ref 70–99)

## 2022-01-31 PROCEDURE — 82962 GLUCOSE BLOOD TEST: CPT

## 2022-01-31 PROCEDURE — 97605 NEG PRS WND THER DME<=50SQCM: CPT

## 2022-01-31 NOTE — PROGRESS NOTES
Patient presents with:  Wound Care: Patient is here for wound vac dressing change          Current Outpatient Medications:   •  furosemide 40 MG Oral Tab, Take 1 tablet (40 mg total) by mouth daily. , Disp: 30 tablet, Rfl: 3  •  lisinopril 2.5 MG Oral Tab, Well-defined edges Well-defined edges   Non-staged Wound Description Full thickness Full thickness   Anamaria-wound Assessment Edema —   Wound Granulation Tissue Pink;Red;Firm —   Wound Bed Granulation (%) 75 % 70 %   Wound Bed Slough (%) 25 % 30 %   Wound Odo No Linked orders to display          ASSESSMENT AND PLAN:        Risks, benefits, and alternatives of current treatment plan discussed in detail. Questions and concerns addressed. Red flags to RTC or ED reviewed. Patient (or parent) agrees to plan.

## 2022-02-01 ENCOUNTER — TELEPHONE (OUTPATIENT)
Dept: WOUND CARE | Facility: HOSPITAL | Age: 72
End: 2022-02-01

## 2022-02-01 NOTE — TELEPHONE ENCOUNTER
Spoke to patient and daughter - Wound vac \"not working, won't hold a charge. Advised to call [employer] for a replacement machine. Educated on wet to dry dressings - if wound vac stops working for 2 hours. She verbalizes understanding. Provider aware.

## 2022-02-02 ENCOUNTER — OFFICE VISIT (OUTPATIENT)
Dept: WOUND CARE | Facility: HOSPITAL | Age: 72
End: 2022-02-02
Attending: NURSE PRACTITIONER
Payer: MEDICARE

## 2022-02-02 VITALS
HEART RATE: 73 BPM | TEMPERATURE: 97 F | SYSTOLIC BLOOD PRESSURE: 124 MMHG | DIASTOLIC BLOOD PRESSURE: 67 MMHG | RESPIRATION RATE: 14 BRPM

## 2022-02-02 DIAGNOSIS — E11.59 TYPE 2 DIABETES MELLITUS WITH OTHER CIRCULATORY COMPLICATIONS (HCC): ICD-10-CM

## 2022-02-02 DIAGNOSIS — A48.8 SERRATIA MARCESCENS INFECTION: ICD-10-CM

## 2022-02-02 DIAGNOSIS — T81.89XS PROTRUDING STERNAL WIRES, SEQUELA: ICD-10-CM

## 2022-02-02 DIAGNOSIS — T81.31XD DISRUPTION OF EXTERNAL OPERATION (SURGICAL) WOUND, NOT ELSEWHERE CLASSIFIED, SUBSEQUENT ENCOUNTER: Primary | ICD-10-CM

## 2022-02-02 DIAGNOSIS — T81.30XA WOUND DEHISCENCE: ICD-10-CM

## 2022-02-02 LAB — GLUCOSE BLD-MCNC: 211 MG/DL (ref 70–99)

## 2022-02-02 PROCEDURE — 15271 SKIN SUB GRAFT TRNK/ARM/LEG: CPT | Performed by: NURSE PRACTITIONER

## 2022-02-02 NOTE — PROGRESS NOTES
.Weekly Wound Education Note    Teaching Provided To: Patient; Family  Training Topics: Negative presssure therapy;Skin substitute; Discharge instructions;Dressing  Training Method: Explain/Verbal;Written  Training Response: Patient responds and understands; Reinforcement needed         Misonix for debridement. 1st application of TheraSkin this visit, ID# [id number], Exp. 1/23/2025. Coloplast to alaina area, sorbact with steri strips, continue NPWT at 125mmHg. Coloplast to inferior wound covered with bandaid.

## 2022-02-02 NOTE — PROGRESS NOTES
Patient ID: Faith Pagan is a 66year old male. Cellular tissue product application    Date/Time: 2/2/2022 10:26 AM  Performed by: VIKTORIA Martino  Authorized by: VIKTORIA Martino   Associated Wounds:   Wound 01/12/22 #1 Chest Old surgical Sternum Upper    Consent:     Consent obtained:  Verbal    Consent given by:  Patient  Anesthesia (see MAR for exact dosages): Anesthesia method:  None  Procedure details:     Location:  trunk/arms/legs    Product applied:  [employer]    Product lot #:  [id number]    Product expiration:  1/23/2025    Amount used (cm^2):  13    Amount wasted (cm^2):  0    Secured: Yes      Secured with:  Steri strips  Post-procedure details:     Patient tolerance of procedure:   Tolerated well, no immediate complications  Comments:      Sorbact, NPWT

## 2022-02-04 ENCOUNTER — OFFICE VISIT (OUTPATIENT)
Dept: WOUND CARE | Facility: HOSPITAL | Age: 72
End: 2022-02-04
Attending: NURSE PRACTITIONER
Payer: MEDICARE

## 2022-02-04 VITALS
RESPIRATION RATE: 14 BRPM | DIASTOLIC BLOOD PRESSURE: 83 MMHG | TEMPERATURE: 98 F | HEART RATE: 69 BPM | SYSTOLIC BLOOD PRESSURE: 150 MMHG

## 2022-02-04 DIAGNOSIS — T81.30XA WOUND DEHISCENCE: ICD-10-CM

## 2022-02-04 PROCEDURE — 97605 NEG PRS WND THER DME<=50SQCM: CPT

## 2022-02-07 ENCOUNTER — OFFICE VISIT (OUTPATIENT)
Dept: WOUND CARE | Facility: HOSPITAL | Age: 72
End: 2022-02-07
Attending: NURSE PRACTITIONER
Payer: MEDICARE

## 2022-02-07 VITALS
SYSTOLIC BLOOD PRESSURE: 139 MMHG | RESPIRATION RATE: 16 BRPM | DIASTOLIC BLOOD PRESSURE: 73 MMHG | TEMPERATURE: 97 F | HEART RATE: 76 BPM

## 2022-02-07 DIAGNOSIS — T81.30XA WOUND DEHISCENCE: Primary | ICD-10-CM

## 2022-02-07 LAB — GLUCOSE BLD-MCNC: 194 MG/DL (ref 70–99)

## 2022-02-07 PROCEDURE — 97605 NEG PRS WND THER DME<=50SQCM: CPT

## 2022-02-07 PROCEDURE — 82962 GLUCOSE BLOOD TEST: CPT

## 2022-02-07 NOTE — PROGRESS NOTES
Weekly Wound Education Note    Teaching Provided To: Patient  Training Topics: Negative presssure therapy;Dressing;Cleasing and general instructions  Training Method: Explain/Verbal           Notes: continue NPWT wound improving

## 2022-02-09 ENCOUNTER — OFFICE VISIT (OUTPATIENT)
Dept: WOUND CARE | Facility: HOSPITAL | Age: 72
End: 2022-02-09
Attending: NURSE PRACTITIONER
Payer: MEDICARE

## 2022-02-09 VITALS
DIASTOLIC BLOOD PRESSURE: 71 MMHG | RESPIRATION RATE: 16 BRPM | HEART RATE: 75 BPM | SYSTOLIC BLOOD PRESSURE: 125 MMHG | TEMPERATURE: 98 F

## 2022-02-09 DIAGNOSIS — E11.59 TYPE 2 DIABETES MELLITUS WITH OTHER CIRCULATORY COMPLICATIONS (HCC): ICD-10-CM

## 2022-02-09 DIAGNOSIS — T81.89XS PROTRUDING STERNAL WIRES, SEQUELA: ICD-10-CM

## 2022-02-09 DIAGNOSIS — A48.8 SERRATIA MARCESCENS INFECTION: ICD-10-CM

## 2022-02-09 DIAGNOSIS — T81.31XD DISRUPTION OF EXTERNAL OPERATION (SURGICAL) WOUND, NOT ELSEWHERE CLASSIFIED, SUBSEQUENT ENCOUNTER: Primary | ICD-10-CM

## 2022-02-09 LAB — GLUCOSE BLD-MCNC: 245 MG/DL (ref 70–99)

## 2022-02-09 PROCEDURE — 99214 OFFICE O/P EST MOD 30 MIN: CPT | Performed by: NURSE PRACTITIONER

## 2022-02-09 NOTE — PROGRESS NOTES
.Weekly Wound Education Note    Teaching Provided To: Patient; Family  Training Topics: Negative presssure therapy; Discharge instructions;Dressing;Skin substitute  Training Method: Explain/Verbal;Written  Training Response: Patient responds and understands           Wound vac on hold x1 week. Silver alginate, border gauze to wound, leave in place until next visit. Continue applying Coloplast TRIAD paste to inferior chest wound daily, may leave open to air.

## 2022-02-11 ENCOUNTER — APPOINTMENT (OUTPATIENT)
Dept: WOUND CARE | Facility: HOSPITAL | Age: 72
End: 2022-02-11
Attending: NURSE PRACTITIONER
Payer: MEDICARE

## 2022-02-14 ENCOUNTER — APPOINTMENT (OUTPATIENT)
Dept: WOUND CARE | Facility: HOSPITAL | Age: 72
End: 2022-02-14
Attending: NURSE PRACTITIONER
Payer: MEDICARE

## 2022-02-16 ENCOUNTER — OFFICE VISIT (OUTPATIENT)
Dept: WOUND CARE | Facility: HOSPITAL | Age: 72
End: 2022-02-16
Attending: NURSE PRACTITIONER
Payer: MEDICARE

## 2022-02-16 VITALS
HEART RATE: 71 BPM | TEMPERATURE: 98 F | DIASTOLIC BLOOD PRESSURE: 75 MMHG | SYSTOLIC BLOOD PRESSURE: 158 MMHG | RESPIRATION RATE: 14 BRPM

## 2022-02-16 DIAGNOSIS — T81.30XA WOUND DEHISCENCE: ICD-10-CM

## 2022-02-16 DIAGNOSIS — T81.31XD DISRUPTION OF EXTERNAL OPERATION (SURGICAL) WOUND, NOT ELSEWHERE CLASSIFIED, SUBSEQUENT ENCOUNTER: Primary | ICD-10-CM

## 2022-02-16 DIAGNOSIS — E11.59 TYPE 2 DIABETES MELLITUS WITH OTHER CIRCULATORY COMPLICATIONS (HCC): ICD-10-CM

## 2022-02-16 DIAGNOSIS — T81.89XS PROTRUDING STERNAL WIRES, SEQUELA: ICD-10-CM

## 2022-02-16 LAB — GLUCOSE BLD-MCNC: 228 MG/DL (ref 70–99)

## 2022-02-16 PROCEDURE — 99213 OFFICE O/P EST LOW 20 MIN: CPT | Performed by: NURSE PRACTITIONER

## 2022-02-16 NOTE — PROGRESS NOTES
.Weekly Wound Education Note    Teaching Provided To: Patient; Family  Training Topics: Cleasing and general instructions; Discharge instructions;Dressing  Training Method: Explain/Verbal;Written  Training Response: Patient responds and understands             Patient instructed to return NPWT discontinued this visit. Coloplast daily to wound, cover with dry dressing.   Supplies ordered this visit from [facility].

## 2022-02-18 ENCOUNTER — APPOINTMENT (OUTPATIENT)
Dept: WOUND CARE | Facility: HOSPITAL | Age: 72
End: 2022-02-18
Attending: NURSE PRACTITIONER
Payer: MEDICARE

## 2022-02-21 ENCOUNTER — APPOINTMENT (OUTPATIENT)
Dept: WOUND CARE | Facility: HOSPITAL | Age: 72
End: 2022-02-21
Attending: NURSE PRACTITIONER
Payer: MEDICARE

## 2022-02-23 ENCOUNTER — OFFICE VISIT (OUTPATIENT)
Dept: WOUND CARE | Facility: HOSPITAL | Age: 72
End: 2022-02-23
Attending: NURSE PRACTITIONER
Payer: MEDICARE

## 2022-02-23 VITALS
HEART RATE: 73 BPM | TEMPERATURE: 97 F | DIASTOLIC BLOOD PRESSURE: 77 MMHG | SYSTOLIC BLOOD PRESSURE: 148 MMHG | RESPIRATION RATE: 16 BRPM

## 2022-02-23 DIAGNOSIS — L92.9 ABNORMAL GRANULATION TISSUE: ICD-10-CM

## 2022-02-23 DIAGNOSIS — E11.59 TYPE 2 DIABETES MELLITUS WITH OTHER CIRCULATORY COMPLICATIONS (HCC): ICD-10-CM

## 2022-02-23 DIAGNOSIS — T81.31XD DISRUPTION OF EXTERNAL OPERATION (SURGICAL) WOUND, NOT ELSEWHERE CLASSIFIED, SUBSEQUENT ENCOUNTER: ICD-10-CM

## 2022-02-23 DIAGNOSIS — T81.89XS PROTRUDING STERNAL WIRES, SEQUELA: ICD-10-CM

## 2022-02-23 DIAGNOSIS — T81.30XA WOUND DEHISCENCE: ICD-10-CM

## 2022-02-23 LAB — GLUCOSE BLD-MCNC: 153 MG/DL (ref 70–99)

## 2022-02-23 PROCEDURE — 99213 OFFICE O/P EST LOW 20 MIN: CPT | Performed by: NURSE PRACTITIONER

## 2022-02-23 NOTE — PROGRESS NOTES
.Weekly Wound Education Note    Teaching Provided To: Patient; Family  Training Topics: Discharge instructions;Dressing;Cleasing and general instructions  Training Method: Explain/Verbal;Written  Training Response: Patient responds and understands; Reinforcement needed         Continue Coloplast TRIAD hydrophilic paste to wound.

## 2022-02-25 ENCOUNTER — APPOINTMENT (OUTPATIENT)
Dept: WOUND CARE | Facility: HOSPITAL | Age: 72
End: 2022-02-25
Attending: NURSE PRACTITIONER
Payer: MEDICARE

## 2022-02-28 ENCOUNTER — APPOINTMENT (OUTPATIENT)
Dept: WOUND CARE | Facility: HOSPITAL | Age: 72
End: 2022-02-28
Attending: NURSE PRACTITIONER
Payer: MEDICARE

## 2022-03-02 ENCOUNTER — OFFICE VISIT (OUTPATIENT)
Dept: WOUND CARE | Facility: HOSPITAL | Age: 72
End: 2022-03-02
Attending: NURSE PRACTITIONER
Payer: MEDICARE

## 2022-03-02 VITALS
SYSTOLIC BLOOD PRESSURE: 170 MMHG | HEART RATE: 68 BPM | TEMPERATURE: 98 F | DIASTOLIC BLOOD PRESSURE: 84 MMHG | RESPIRATION RATE: 16 BRPM

## 2022-03-02 DIAGNOSIS — T81.31XD DISRUPTION OF EXTERNAL OPERATION (SURGICAL) WOUND, NOT ELSEWHERE CLASSIFIED, SUBSEQUENT ENCOUNTER: ICD-10-CM

## 2022-03-02 DIAGNOSIS — T81.30XA WOUND DEHISCENCE: Primary | ICD-10-CM

## 2022-03-02 DIAGNOSIS — E11.59 TYPE 2 DIABETES MELLITUS WITH OTHER CIRCULATORY COMPLICATIONS (HCC): ICD-10-CM

## 2022-03-02 LAB — GLUCOSE BLD-MCNC: 224 MG/DL (ref 70–99)

## 2022-03-02 PROCEDURE — 99213 OFFICE O/P EST LOW 20 MIN: CPT | Performed by: NURSE PRACTITIONER

## 2022-03-02 RX ORDER — GENTAMICIN SULFATE 1 MG/G
1 OINTMENT TOPICAL 3 TIMES DAILY
Qty: 45 G | Refills: 0 | Status: SHIPPED | OUTPATIENT
Start: 2022-03-02 | End: 2022-03-16

## 2022-03-02 NOTE — PROGRESS NOTES
.Weekly Wound Education Note    Teaching Provided To: Patient; Family  Training Topics: Dressing; Discharge instructions;Cleasing and general instructions  Training Method: Explain/Verbal;Written  Training Response: Patient responds and understands         Treatment changed to Gentamicin ointment twice daily, cover with dry dressing.

## 2022-03-04 ENCOUNTER — APPOINTMENT (OUTPATIENT)
Dept: WOUND CARE | Facility: HOSPITAL | Age: 72
End: 2022-03-04
Attending: NURSE PRACTITIONER
Payer: MEDICARE

## 2022-03-09 ENCOUNTER — OFFICE VISIT (OUTPATIENT)
Dept: WOUND CARE | Facility: HOSPITAL | Age: 72
End: 2022-03-09
Attending: NURSE PRACTITIONER
Payer: MEDICARE

## 2022-03-09 VITALS
TEMPERATURE: 98 F | DIASTOLIC BLOOD PRESSURE: 72 MMHG | RESPIRATION RATE: 16 BRPM | SYSTOLIC BLOOD PRESSURE: 152 MMHG | HEART RATE: 74 BPM

## 2022-03-09 DIAGNOSIS — T81.30XA WOUND DEHISCENCE: Primary | ICD-10-CM

## 2022-03-09 DIAGNOSIS — T81.31XD DISRUPTION OF EXTERNAL OPERATION (SURGICAL) WOUND, NOT ELSEWHERE CLASSIFIED, SUBSEQUENT ENCOUNTER: ICD-10-CM

## 2022-03-09 DIAGNOSIS — T81.89XS PROTRUDING STERNAL WIRES, SEQUELA: ICD-10-CM

## 2022-03-09 DIAGNOSIS — E11.59 TYPE 2 DIABETES MELLITUS WITH OTHER CIRCULATORY COMPLICATIONS (HCC): ICD-10-CM

## 2022-03-09 LAB — GLUCOSE BLD-MCNC: 97 MG/DL (ref 70–99)

## 2022-03-09 PROCEDURE — 99213 OFFICE O/P EST LOW 20 MIN: CPT | Performed by: NURSE PRACTITIONER

## 2022-03-09 NOTE — PROGRESS NOTES
.Weekly Wound Education Note    Teaching Provided To: Patient; Family  Training Topics: Dressing; Discharge instructions;Cleasing and general instructions  Training Method: Explain/Verbal;Written  Training Response: Patient responds and understands; Reinforcement needed      Continue applying Gentamicin ointment 2 times daily and cover with dry dressing.

## 2022-03-16 ENCOUNTER — OFFICE VISIT (OUTPATIENT)
Dept: WOUND CARE | Facility: HOSPITAL | Age: 72
End: 2022-03-16
Attending: NURSE PRACTITIONER
Payer: MEDICARE

## 2022-03-16 VITALS
SYSTOLIC BLOOD PRESSURE: 162 MMHG | TEMPERATURE: 98 F | DIASTOLIC BLOOD PRESSURE: 70 MMHG | RESPIRATION RATE: 16 BRPM | HEART RATE: 73 BPM

## 2022-03-16 DIAGNOSIS — T81.31XD DISRUPTION OF EXTERNAL OPERATION (SURGICAL) WOUND, NOT ELSEWHERE CLASSIFIED, SUBSEQUENT ENCOUNTER: ICD-10-CM

## 2022-03-16 DIAGNOSIS — T81.89XS PROTRUDING STERNAL WIRES, SEQUELA: ICD-10-CM

## 2022-03-16 DIAGNOSIS — T81.30XA WOUND DEHISCENCE: Primary | ICD-10-CM

## 2022-03-16 DIAGNOSIS — E11.59 TYPE 2 DIABETES MELLITUS WITH OTHER CIRCULATORY COMPLICATIONS (HCC): ICD-10-CM

## 2022-03-16 LAB — GLUCOSE BLD-MCNC: 143 MG/DL (ref 70–99)

## 2022-03-16 PROCEDURE — 99212 OFFICE O/P EST SF 10 MIN: CPT | Performed by: NURSE PRACTITIONER

## 2022-03-16 NOTE — PATIENT INSTRUCTIONS
Patient discharge and wound care instructions  Colin Cantor  3/16/2022     discharge from clinic. Follow-up as needed.

## 2022-03-16 NOTE — PROGRESS NOTES
.Weekly Wound Education Note    Teaching Provided To: Patient; Family  Training Topics: Cleasing and general instructions; Discharge instructions  Training Method: Explain/Verbal;Written  Training Response: Patient responds and understands           Wound healed. Patient discharged from clinic. Instructed to moisturize site daily.

## 2022-03-22 ENCOUNTER — APPOINTMENT (OUTPATIENT)
Dept: CARDIAC REHAB | Facility: HOSPITAL | Age: 72
End: 2022-03-22
Attending: INTERNAL MEDICINE
Payer: MEDICARE

## 2022-03-23 ENCOUNTER — APPOINTMENT (OUTPATIENT)
Dept: WOUND CARE | Facility: HOSPITAL | Age: 72
End: 2022-03-23
Attending: NURSE PRACTITIONER
Payer: MEDICARE

## 2022-03-30 ENCOUNTER — APPOINTMENT (OUTPATIENT)
Dept: WOUND CARE | Facility: HOSPITAL | Age: 72
End: 2022-03-30
Attending: NURSE PRACTITIONER
Payer: MEDICARE

## 2022-04-08 ENCOUNTER — ORDER TRANSCRIPTION (OUTPATIENT)
Dept: CARDIAC REHAB | Facility: HOSPITAL | Age: 72
End: 2022-04-08

## 2022-04-12 ENCOUNTER — CARDPULM VISIT (OUTPATIENT)
Dept: CARDIAC REHAB | Facility: HOSPITAL | Age: 72
End: 2022-04-12
Attending: INTERNAL MEDICINE
Payer: MEDICARE

## 2022-04-12 LAB — GLUCOSE BLD-MCNC: 204 MG/DL (ref 70–99)

## 2022-04-12 PROCEDURE — 82962 GLUCOSE BLOOD TEST: CPT

## 2022-04-13 ENCOUNTER — CARDPULM VISIT (OUTPATIENT)
Dept: CARDIAC REHAB | Facility: HOSPITAL | Age: 72
End: 2022-04-13
Attending: INTERNAL MEDICINE
Payer: MEDICARE

## 2022-04-14 ENCOUNTER — CARDPULM VISIT (OUTPATIENT)
Dept: CARDIAC REHAB | Facility: HOSPITAL | Age: 72
End: 2022-04-14
Attending: INTERNAL MEDICINE
Payer: MEDICARE

## 2022-04-18 ENCOUNTER — CARDPULM VISIT (OUTPATIENT)
Dept: CARDIAC REHAB | Facility: HOSPITAL | Age: 72
End: 2022-04-18
Attending: INTERNAL MEDICINE
Payer: MEDICARE

## 2022-04-20 ENCOUNTER — CARDPULM VISIT (OUTPATIENT)
Dept: CARDIAC REHAB | Facility: HOSPITAL | Age: 72
End: 2022-04-20
Attending: INTERNAL MEDICINE
Payer: MEDICARE

## 2022-04-21 ENCOUNTER — CARDPULM VISIT (OUTPATIENT)
Dept: CARDIAC REHAB | Facility: HOSPITAL | Age: 72
End: 2022-04-21
Attending: INTERNAL MEDICINE
Payer: MEDICARE

## 2022-04-25 ENCOUNTER — CARDPULM VISIT (OUTPATIENT)
Dept: CARDIAC REHAB | Facility: HOSPITAL | Age: 72
End: 2022-04-25
Attending: INTERNAL MEDICINE
Payer: MEDICARE

## 2022-04-27 ENCOUNTER — CARDPULM VISIT (OUTPATIENT)
Dept: CARDIAC REHAB | Facility: HOSPITAL | Age: 72
End: 2022-04-27
Attending: INTERNAL MEDICINE
Payer: MEDICARE

## 2022-04-28 ENCOUNTER — CARDPULM VISIT (OUTPATIENT)
Dept: CARDIAC REHAB | Facility: HOSPITAL | Age: 72
End: 2022-04-28
Attending: INTERNAL MEDICINE
Payer: MEDICARE

## 2022-05-02 ENCOUNTER — CARDPULM VISIT (OUTPATIENT)
Dept: CARDIAC REHAB | Facility: HOSPITAL | Age: 72
End: 2022-05-02
Attending: INTERNAL MEDICINE
Payer: MEDICARE

## 2022-05-04 ENCOUNTER — CARDPULM VISIT (OUTPATIENT)
Dept: CARDIAC REHAB | Facility: HOSPITAL | Age: 72
End: 2022-05-04
Attending: INTERNAL MEDICINE
Payer: MEDICARE

## 2022-05-05 ENCOUNTER — CARDPULM VISIT (OUTPATIENT)
Dept: CARDIAC REHAB | Facility: HOSPITAL | Age: 72
End: 2022-05-05
Attending: INTERNAL MEDICINE
Payer: MEDICARE

## 2022-05-09 ENCOUNTER — CARDPULM VISIT (OUTPATIENT)
Dept: CARDIAC REHAB | Facility: HOSPITAL | Age: 72
End: 2022-05-09
Attending: INTERNAL MEDICINE
Payer: MEDICARE

## 2022-05-11 ENCOUNTER — CARDPULM VISIT (OUTPATIENT)
Dept: CARDIAC REHAB | Facility: HOSPITAL | Age: 72
End: 2022-05-11
Attending: INTERNAL MEDICINE
Payer: MEDICARE

## 2022-05-11 ENCOUNTER — HOSPITAL ENCOUNTER (EMERGENCY)
Facility: HOSPITAL | Age: 72
Discharge: HOME | End: 2022-05-11
Attending: EMERGENCY MEDICINE
Payer: MEDICARE

## 2022-05-11 ENCOUNTER — APPOINTMENT (OUTPATIENT)
Dept: GENERAL RADIOLOGY | Facility: HOSPITAL | Age: 72
End: 2022-05-11
Attending: EMERGENCY MEDICINE
Payer: MEDICARE

## 2022-05-11 VITALS
RESPIRATION RATE: 17 BRPM | HEART RATE: 79 BPM | SYSTOLIC BLOOD PRESSURE: 177 MMHG | TEMPERATURE: 97 F | OXYGEN SATURATION: 99 % | DIASTOLIC BLOOD PRESSURE: 89 MMHG

## 2022-05-11 DIAGNOSIS — I49.9 CARDIAC ARRHYTHMIA, UNSPECIFIED CARDIAC ARRHYTHMIA TYPE: Primary | ICD-10-CM

## 2022-05-11 LAB
ALBUMIN SERPL-MCNC: 3.2 G/DL (ref 3.4–5)
ALBUMIN/GLOB SERPL: 0.7 {RATIO} (ref 1–2)
ALP LIVER SERPL-CCNC: 154 U/L
ALT SERPL-CCNC: 31 U/L
ANION GAP SERPL CALC-SCNC: 4 MMOL/L (ref 0–18)
AST SERPL-CCNC: 27 U/L (ref 15–37)
BASOPHILS # BLD AUTO: 0.05 X10(3) UL (ref 0–0.2)
BASOPHILS NFR BLD AUTO: 0.5 %
BILIRUB SERPL-MCNC: 0.5 MG/DL (ref 0.1–2)
BUN BLD-MCNC: 26 MG/DL (ref 7–18)
CALCIUM BLD-MCNC: 8.9 MG/DL (ref 8.5–10.1)
CHLORIDE SERPL-SCNC: 109 MMOL/L (ref 98–112)
CO2 SERPL-SCNC: 26 MMOL/L (ref 21–32)
CREAT BLD-MCNC: 1.87 MG/DL
EOSINOPHIL # BLD AUTO: 1.22 X10(3) UL (ref 0–0.7)
EOSINOPHIL NFR BLD AUTO: 12.6 %
ERYTHROCYTE [DISTWIDTH] IN BLOOD BY AUTOMATED COUNT: 13.1 %
GLOBULIN PLAS-MCNC: 4.4 G/DL (ref 2.8–4.4)
GLUCOSE BLD-MCNC: 119 MG/DL (ref 70–99)
GLUCOSE BLD-MCNC: 158 MG/DL (ref 70–99)
HCT VFR BLD AUTO: 35 %
HGB BLD-MCNC: 11.2 G/DL
IMM GRANULOCYTES # BLD AUTO: 0.01 X10(3) UL (ref 0–1)
IMM GRANULOCYTES NFR BLD: 0.1 %
LYMPHOCYTES # BLD AUTO: 2.16 X10(3) UL (ref 1–4)
LYMPHOCYTES NFR BLD AUTO: 22.3 %
MCH RBC QN AUTO: 28.1 PG (ref 26–34)
MCHC RBC AUTO-ENTMCNC: 32 G/DL (ref 31–37)
MCV RBC AUTO: 87.9 FL
MONOCYTES # BLD AUTO: 0.78 X10(3) UL (ref 0.1–1)
MONOCYTES NFR BLD AUTO: 8.1 %
NEUTROPHILS # BLD AUTO: 5.45 X10 (3) UL (ref 1.5–7.7)
NEUTROPHILS # BLD AUTO: 5.45 X10(3) UL (ref 1.5–7.7)
NEUTROPHILS NFR BLD AUTO: 56.4 %
NT-PROBNP SERPL-MCNC: 1690 PG/ML (ref ?–125)
OSMOLALITY SERPL CALC.SUM OF ELEC: 294 MOSM/KG (ref 275–295)
PLATELET # BLD AUTO: 157 10(3)UL (ref 150–450)
POTASSIUM SERPL-SCNC: 4.9 MMOL/L (ref 3.5–5.1)
PROT SERPL-MCNC: 7.6 G/DL (ref 6.4–8.2)
RBC # BLD AUTO: 3.98 X10(6)UL
SARS-COV-2 RNA RESP QL NAA+PROBE: NOT DETECTED
SODIUM SERPL-SCNC: 139 MMOL/L (ref 136–145)
TROPONIN I HIGH SENSITIVITY: 19 NG/L
WBC # BLD AUTO: 9.7 X10(3) UL (ref 4–11)

## 2022-05-11 PROCEDURE — 71045 X-RAY EXAM CHEST 1 VIEW: CPT | Performed by: EMERGENCY MEDICINE

## 2022-05-11 PROCEDURE — 80053 COMPREHEN METABOLIC PANEL: CPT | Performed by: EMERGENCY MEDICINE

## 2022-05-11 PROCEDURE — 83880 ASSAY OF NATRIURETIC PEPTIDE: CPT | Performed by: EMERGENCY MEDICINE

## 2022-05-11 PROCEDURE — 85025 COMPLETE CBC W/AUTO DIFF WBC: CPT | Performed by: EMERGENCY MEDICINE

## 2022-05-11 PROCEDURE — 93010 ELECTROCARDIOGRAM REPORT: CPT

## 2022-05-11 PROCEDURE — 82962 GLUCOSE BLOOD TEST: CPT

## 2022-05-11 PROCEDURE — 99285 EMERGENCY DEPT VISIT HI MDM: CPT

## 2022-05-11 PROCEDURE — 36415 COLL VENOUS BLD VENIPUNCTURE: CPT

## 2022-05-11 PROCEDURE — 93005 ELECTROCARDIOGRAM TRACING: CPT

## 2022-05-11 PROCEDURE — 84484 ASSAY OF TROPONIN QUANT: CPT | Performed by: EMERGENCY MEDICINE

## 2022-05-11 PROCEDURE — 99284 EMERGENCY DEPT VISIT MOD MDM: CPT

## 2022-05-12 ENCOUNTER — APPOINTMENT (OUTPATIENT)
Dept: CARDIAC REHAB | Facility: HOSPITAL | Age: 72
End: 2022-05-12
Attending: INTERNAL MEDICINE
Payer: MEDICARE

## 2022-05-13 LAB
ATRIAL RATE: 108 BPM
ATRIAL RATE: 75 BPM
P AXIS: 21 DEGREES
P-R INTERVAL: 134 MS
Q-T INTERVAL: 324 MS
Q-T INTERVAL: 380 MS
QRS DURATION: 70 MS
QRS DURATION: 84 MS
QTC CALCULATION (BEZET): 424 MS
QTC CALCULATION (BEZET): 476 MS
R AXIS: 13 DEGREES
R AXIS: 51 DEGREES
T AXIS: 66 DEGREES
T AXIS: 70 DEGREES
VENTRICULAR RATE: 130 BPM
VENTRICULAR RATE: 75 BPM

## 2022-05-16 ENCOUNTER — APPOINTMENT (OUTPATIENT)
Dept: CARDIAC REHAB | Facility: HOSPITAL | Age: 72
End: 2022-05-16
Attending: INTERNAL MEDICINE
Payer: MEDICARE

## 2022-05-18 ENCOUNTER — CARDPULM VISIT (OUTPATIENT)
Dept: CARDIAC REHAB | Facility: HOSPITAL | Age: 72
End: 2022-05-18
Attending: INTERNAL MEDICINE
Payer: MEDICARE

## 2022-05-18 PROCEDURE — 93798 PHYS/QHP OP CAR RHAB W/ECG: CPT

## 2022-05-19 ENCOUNTER — CARDPULM VISIT (OUTPATIENT)
Dept: CARDIAC REHAB | Facility: HOSPITAL | Age: 72
End: 2022-05-19
Attending: INTERNAL MEDICINE
Payer: MEDICARE

## 2022-05-19 PROCEDURE — 93798 PHYS/QHP OP CAR RHAB W/ECG: CPT

## 2022-05-23 ENCOUNTER — CARDPULM VISIT (OUTPATIENT)
Dept: CARDIAC REHAB | Facility: HOSPITAL | Age: 72
End: 2022-05-23
Attending: INTERNAL MEDICINE
Payer: MEDICARE

## 2022-05-23 PROCEDURE — 93798 PHYS/QHP OP CAR RHAB W/ECG: CPT

## 2022-05-25 ENCOUNTER — CARDPULM VISIT (OUTPATIENT)
Dept: CARDIAC REHAB | Facility: HOSPITAL | Age: 72
End: 2022-05-25
Attending: INTERNAL MEDICINE
Payer: MEDICARE

## 2022-05-25 PROCEDURE — 93798 PHYS/QHP OP CAR RHAB W/ECG: CPT

## 2022-05-26 ENCOUNTER — CARDPULM VISIT (OUTPATIENT)
Dept: CARDIAC REHAB | Facility: HOSPITAL | Age: 72
End: 2022-05-26
Attending: INTERNAL MEDICINE
Payer: MEDICARE

## 2022-05-26 PROCEDURE — 93798 PHYS/QHP OP CAR RHAB W/ECG: CPT

## 2022-05-30 ENCOUNTER — APPOINTMENT (OUTPATIENT)
Dept: CARDIAC REHAB | Facility: HOSPITAL | Age: 72
End: 2022-05-30
Attending: INTERNAL MEDICINE
Payer: MEDICARE

## 2022-06-01 ENCOUNTER — CARDPULM VISIT (OUTPATIENT)
Dept: CARDIAC REHAB | Facility: HOSPITAL | Age: 72
End: 2022-06-01
Attending: INTERNAL MEDICINE
Payer: MEDICARE

## 2022-06-01 PROCEDURE — 93798 PHYS/QHP OP CAR RHAB W/ECG: CPT

## 2022-06-02 ENCOUNTER — CARDPULM VISIT (OUTPATIENT)
Dept: CARDIAC REHAB | Facility: HOSPITAL | Age: 72
End: 2022-06-02
Attending: INTERNAL MEDICINE
Payer: MEDICARE

## 2022-06-02 PROCEDURE — 93798 PHYS/QHP OP CAR RHAB W/ECG: CPT

## 2022-06-06 ENCOUNTER — CARDPULM VISIT (OUTPATIENT)
Dept: CARDIAC REHAB | Facility: HOSPITAL | Age: 72
End: 2022-06-06
Attending: INTERNAL MEDICINE
Payer: MEDICARE

## 2022-06-06 PROCEDURE — 93798 PHYS/QHP OP CAR RHAB W/ECG: CPT

## 2022-06-08 ENCOUNTER — CARDPULM VISIT (OUTPATIENT)
Dept: CARDIAC REHAB | Facility: HOSPITAL | Age: 72
End: 2022-06-08
Attending: INTERNAL MEDICINE
Payer: MEDICARE

## 2022-06-08 PROCEDURE — 93798 PHYS/QHP OP CAR RHAB W/ECG: CPT

## 2022-06-09 ENCOUNTER — CARDPULM VISIT (OUTPATIENT)
Dept: CARDIAC REHAB | Facility: HOSPITAL | Age: 72
End: 2022-06-09
Attending: INTERNAL MEDICINE
Payer: MEDICARE

## 2022-06-09 PROCEDURE — 93798 PHYS/QHP OP CAR RHAB W/ECG: CPT

## 2022-06-13 ENCOUNTER — CARDPULM VISIT (OUTPATIENT)
Dept: CARDIAC REHAB | Facility: HOSPITAL | Age: 72
End: 2022-06-13
Attending: INTERNAL MEDICINE
Payer: MEDICARE

## 2022-06-13 PROCEDURE — 93798 PHYS/QHP OP CAR RHAB W/ECG: CPT

## 2022-06-15 ENCOUNTER — CARDPULM VISIT (OUTPATIENT)
Dept: CARDIAC REHAB | Facility: HOSPITAL | Age: 72
End: 2022-06-15
Attending: INTERNAL MEDICINE
Payer: MEDICARE

## 2022-06-15 PROCEDURE — 93798 PHYS/QHP OP CAR RHAB W/ECG: CPT

## 2022-06-16 ENCOUNTER — CARDPULM VISIT (OUTPATIENT)
Dept: CARDIAC REHAB | Facility: HOSPITAL | Age: 72
End: 2022-06-16
Attending: INTERNAL MEDICINE
Payer: MEDICARE

## 2022-06-16 PROCEDURE — 93798 PHYS/QHP OP CAR RHAB W/ECG: CPT

## 2022-06-20 ENCOUNTER — CARDPULM VISIT (OUTPATIENT)
Dept: CARDIAC REHAB | Facility: HOSPITAL | Age: 72
End: 2022-06-20
Attending: INTERNAL MEDICINE
Payer: MEDICARE

## 2022-06-20 PROCEDURE — 93798 PHYS/QHP OP CAR RHAB W/ECG: CPT

## 2022-06-22 ENCOUNTER — APPOINTMENT (OUTPATIENT)
Dept: CARDIAC REHAB | Facility: HOSPITAL | Age: 72
End: 2022-06-22
Attending: INTERNAL MEDICINE
Payer: MEDICARE

## 2022-06-23 ENCOUNTER — CARDPULM VISIT (OUTPATIENT)
Dept: CARDIAC REHAB | Facility: HOSPITAL | Age: 72
End: 2022-06-23
Attending: INTERNAL MEDICINE
Payer: MEDICARE

## 2022-06-23 PROCEDURE — 93798 PHYS/QHP OP CAR RHAB W/ECG: CPT

## 2022-06-27 ENCOUNTER — CARDPULM VISIT (OUTPATIENT)
Dept: CARDIAC REHAB | Facility: HOSPITAL | Age: 72
End: 2022-06-27
Attending: INTERNAL MEDICINE
Payer: MEDICARE

## 2022-06-27 PROCEDURE — 93798 PHYS/QHP OP CAR RHAB W/ECG: CPT

## 2022-06-29 ENCOUNTER — CARDPULM VISIT (OUTPATIENT)
Dept: CARDIAC REHAB | Facility: HOSPITAL | Age: 72
End: 2022-06-29
Attending: INTERNAL MEDICINE
Payer: MEDICARE

## 2022-06-29 PROCEDURE — 93798 PHYS/QHP OP CAR RHAB W/ECG: CPT

## 2022-06-30 ENCOUNTER — CARDPULM VISIT (OUTPATIENT)
Dept: CARDIAC REHAB | Facility: HOSPITAL | Age: 72
End: 2022-06-30
Attending: INTERNAL MEDICINE
Payer: MEDICARE

## 2022-06-30 PROCEDURE — 93798 PHYS/QHP OP CAR RHAB W/ECG: CPT

## 2022-07-04 ENCOUNTER — APPOINTMENT (OUTPATIENT)
Dept: CARDIAC REHAB | Facility: HOSPITAL | Age: 72
End: 2022-07-04
Attending: INTERNAL MEDICINE
Payer: MEDICARE

## 2022-07-06 ENCOUNTER — CARDPULM VISIT (OUTPATIENT)
Dept: CARDIAC REHAB | Facility: HOSPITAL | Age: 72
End: 2022-07-06
Attending: INTERNAL MEDICINE
Payer: MEDICARE

## 2022-07-06 PROCEDURE — 93798 PHYS/QHP OP CAR RHAB W/ECG: CPT

## 2022-07-07 ENCOUNTER — CARDPULM VISIT (OUTPATIENT)
Dept: CARDIAC REHAB | Facility: HOSPITAL | Age: 72
End: 2022-07-07
Attending: INTERNAL MEDICINE
Payer: MEDICARE

## 2022-07-07 PROCEDURE — 93798 PHYS/QHP OP CAR RHAB W/ECG: CPT

## 2022-07-11 ENCOUNTER — CARDPULM VISIT (OUTPATIENT)
Dept: CARDIAC REHAB | Facility: HOSPITAL | Age: 72
End: 2022-07-11
Attending: INTERNAL MEDICINE
Payer: MEDICARE

## 2022-07-11 PROCEDURE — 93798 PHYS/QHP OP CAR RHAB W/ECG: CPT

## 2022-07-13 ENCOUNTER — CARDPULM VISIT (OUTPATIENT)
Dept: CARDIAC REHAB | Facility: HOSPITAL | Age: 72
End: 2022-07-13
Attending: INTERNAL MEDICINE
Payer: MEDICARE

## 2022-07-13 PROCEDURE — 93798 PHYS/QHP OP CAR RHAB W/ECG: CPT

## 2022-07-14 ENCOUNTER — APPOINTMENT (OUTPATIENT)
Dept: CARDIAC REHAB | Facility: HOSPITAL | Age: 72
End: 2022-07-14
Payer: MEDICARE

## 2024-01-01 NOTE — ANESTHESIA POSTPROCEDURE EVALUATION
[facility]    Nara Raghav Patient Status:  Inpatient   Age/Gender 65year old male MRN [id number]   [facility] 6NE-A Attending Leigha Still MD   [facility] Day # 8 PCP LUIS ALFREDO SHAFFER       Anesthesia Post-op Note    Procedure(s):  Left poplite Statement Selected

## 2024-02-16 ENCOUNTER — APPOINTMENT (OUTPATIENT)
Dept: OPHTHALMOLOGY | Age: 74
End: 2024-02-16

## 2024-02-16 DIAGNOSIS — E11.3293 TYPE 2 DIABETES MELLITUS WITH BOTH EYES AFFECTED BY MILD NONPROLIFERATIVE RETINOPATHY WITHOUT MACULAR EDEMA, WITH LONG-TERM CURRENT USE OF INSULIN (CMD): Primary | ICD-10-CM

## 2024-02-16 DIAGNOSIS — H26.491 PCO (POSTERIOR CAPSULAR OPACIFICATION), RIGHT: ICD-10-CM

## 2024-02-16 DIAGNOSIS — H35.373 MACULAR PUCKER, BILATERAL: ICD-10-CM

## 2024-02-16 DIAGNOSIS — Z79.4 TYPE 2 DIABETES MELLITUS WITH BOTH EYES AFFECTED BY MILD NONPROLIFERATIVE RETINOPATHY WITHOUT MACULAR EDEMA, WITH LONG-TERM CURRENT USE OF INSULIN (CMD): Primary | ICD-10-CM

## 2024-02-20 ENCOUNTER — TELEPHONE (OUTPATIENT)
Dept: OPHTHALMOLOGY | Age: 74
End: 2024-02-20

## 2024-02-22 ENCOUNTER — EXTERNAL RECORD (OUTPATIENT)
Dept: HEALTH INFORMATION MANAGEMENT | Facility: OTHER | Age: 74
End: 2024-02-22

## 2024-02-23 ENCOUNTER — TELEPHONE (OUTPATIENT)
Dept: SURGERY | Age: 74
End: 2024-02-23

## 2024-02-27 ENCOUNTER — HOSPITAL ENCOUNTER (OUTPATIENT)
Age: 74
Discharge: HOME | End: 2024-02-27

## 2024-02-27 DIAGNOSIS — H26.491 RIGHT POSTERIOR CAPSULAR OPACIFICATION: ICD-10-CM

## 2024-02-27 PROCEDURE — 66821 AFTER CATARACT LASER SURGERY: CPT | Performed by: CLINIC/CENTER

## 2024-02-27 PROCEDURE — 66821 AFTER CATARACT LASER SURGERY: CPT | Performed by: OPHTHALMOLOGY

## 2024-02-27 RX ORDER — PROPARACAINE HYDROCHLORIDE 5 MG/ML
1 SOLUTION/ DROPS OPHTHALMIC ONCE
Status: COMPLETED | OUTPATIENT
Start: 2024-02-27 | End: 2024-02-27

## 2024-02-27 RX ORDER — TROPICAMIDE 10 MG/ML
1 SOLUTION/ DROPS OPHTHALMIC ONCE
Status: COMPLETED | OUTPATIENT
Start: 2024-02-27 | End: 2024-02-27

## 2024-02-27 RX ORDER — PHENYLEPHRINE HYDROCHLORIDE 25 MG/ML
1 SOLUTION/ DROPS OPHTHALMIC ONCE
Status: COMPLETED | OUTPATIENT
Start: 2024-02-27 | End: 2024-02-27

## 2024-02-27 RX ORDER — BRIMONIDINE TARTRATE 2 MG/ML
1 SOLUTION/ DROPS OPHTHALMIC ONCE
Status: COMPLETED | OUTPATIENT
Start: 2024-02-27 | End: 2024-02-27

## 2024-02-27 RX ADMIN — TROPICAMIDE 1 DROP: 10 SOLUTION/ DROPS OPHTHALMIC at 10:12

## 2024-02-27 RX ADMIN — PHENYLEPHRINE HYDROCHLORIDE 1 DROP: 25 SOLUTION/ DROPS OPHTHALMIC at 10:12

## 2024-02-27 RX ADMIN — BRIMONIDINE TARTRATE 1 DROP: 2 SOLUTION/ DROPS OPHTHALMIC at 10:40

## 2024-02-27 RX ADMIN — PROPARACAINE HYDROCHLORIDE 1 DROP: 5 SOLUTION/ DROPS OPHTHALMIC at 10:12

## 2024-02-27 ASSESSMENT — PAIN SCALES - GENERAL: PAINLEVEL_OUTOF10: 0

## 2024-03-05 ENCOUNTER — APPOINTMENT (OUTPATIENT)
Dept: OPHTHALMOLOGY | Age: 74
End: 2024-03-05

## 2024-03-05 DIAGNOSIS — H26.491 PCO (POSTERIOR CAPSULAR OPACIFICATION), RIGHT: Primary | ICD-10-CM

## 2024-03-05 PROCEDURE — 99024 POSTOP FOLLOW-UP VISIT: CPT | Performed by: OPHTHALMOLOGY

## 2024-04-02 ENCOUNTER — HOSPITAL ENCOUNTER (INPATIENT)
Facility: HOSPITAL | Age: 74
LOS: 1 days | Discharge: HOME HEALTH | End: 2024-04-03
Attending: STUDENT IN AN ORGANIZED HEALTH CARE EDUCATION/TRAINING PROGRAM | Admitting: HOSPITALIST
Payer: MEDICARE

## 2024-04-02 ENCOUNTER — APPOINTMENT (OUTPATIENT)
Dept: GENERAL RADIOLOGY | Facility: HOSPITAL | Age: 74
End: 2024-04-02
Payer: MEDICARE

## 2024-04-02 ENCOUNTER — APPOINTMENT (OUTPATIENT)
Dept: CT IMAGING | Facility: HOSPITAL | Age: 74
End: 2024-04-02
Attending: STUDENT IN AN ORGANIZED HEALTH CARE EDUCATION/TRAINING PROGRAM
Payer: MEDICARE

## 2024-04-02 DIAGNOSIS — A41.89 SEPSIS DUE TO OTHER ETIOLOGY (HCC): Primary | ICD-10-CM

## 2024-04-02 DIAGNOSIS — N17.9 AKI (ACUTE KIDNEY INJURY) (HCC): ICD-10-CM

## 2024-04-02 DIAGNOSIS — E86.1 HYPOTENSION DUE TO HYPOVOLEMIA: ICD-10-CM

## 2024-04-02 PROBLEM — E13.00 DM (DIABETES MELLITUS), SECONDARY, UNCONTROLLED, WITH HYPEROSMOLARITY (HCC): Status: ACTIVE | Noted: 2024-04-02

## 2024-04-02 PROBLEM — R00.1 SINUS BRADYCARDIA: Status: ACTIVE | Noted: 2024-04-02

## 2024-04-02 LAB
ALBUMIN SERPL-MCNC: 3.3 G/DL (ref 3.4–5)
ALBUMIN/GLOB SERPL: 0.7 {RATIO} (ref 1–2)
ALP LIVER SERPL-CCNC: 174 U/L
ALT SERPL-CCNC: 17 U/L
ANION GAP SERPL CALC-SCNC: 9 MMOL/L (ref 0–18)
AST SERPL-CCNC: 15 U/L (ref 15–37)
BASOPHILS # BLD AUTO: 0.04 X10(3) UL (ref 0–0.2)
BASOPHILS NFR BLD AUTO: 0.6 %
BILIRUB SERPL-MCNC: 1.2 MG/DL (ref 0.1–2)
BILIRUB UR QL STRIP.AUTO: NEGATIVE
BUN BLD-MCNC: 36 MG/DL (ref 9–23)
CALCIUM BLD-MCNC: 9.2 MG/DL (ref 8.5–10.1)
CHLORIDE SERPL-SCNC: 98 MMOL/L (ref 98–112)
CLARITY UR REFRACT.AUTO: CLEAR
CO2 SERPL-SCNC: 26 MMOL/L (ref 21–32)
CREAT BLD-MCNC: 2.97 MG/DL
EGFRCR SERPLBLD CKD-EPI 2021: 21 ML/MIN/1.73M2 (ref 60–?)
EOSINOPHIL # BLD AUTO: 0.18 X10(3) UL (ref 0–0.7)
EOSINOPHIL NFR BLD AUTO: 2.7 %
ERYTHROCYTE [DISTWIDTH] IN BLOOD BY AUTOMATED COUNT: 12.7 %
EST. AVERAGE GLUCOSE BLD GHB EST-MCNC: 401 MG/DL (ref 68–126)
FLUAV + FLUBV RNA SPEC NAA+PROBE: NEGATIVE
FLUAV + FLUBV RNA SPEC NAA+PROBE: NEGATIVE
GLOBULIN PLAS-MCNC: 4.5 G/DL (ref 2.8–4.4)
GLUCOSE BLD-MCNC: 284 MG/DL (ref 70–99)
GLUCOSE BLD-MCNC: 316 MG/DL (ref 70–99)
GLUCOSE BLD-MCNC: 330 MG/DL (ref 70–99)
GLUCOSE BLD-MCNC: 405 MG/DL (ref 70–99)
GLUCOSE BLD-MCNC: 439 MG/DL (ref 70–99)
GLUCOSE UR STRIP.AUTO-MCNC: >1000 MG/DL
HBA1C MFR BLD: 15.6 % (ref ?–5.7)
HCT VFR BLD AUTO: 36.6 %
HGB BLD-MCNC: 12.6 G/DL
IMM GRANULOCYTES # BLD AUTO: 0.03 X10(3) UL (ref 0–1)
IMM GRANULOCYTES NFR BLD: 0.4 %
KETONES UR STRIP.AUTO-MCNC: NEGATIVE MG/DL
LACTATE SERPL-SCNC: 1.3 MMOL/L (ref 0.4–2)
LACTATE SERPL-SCNC: 2.3 MMOL/L (ref 0.4–2)
LACTATE SERPL-SCNC: 3.7 MMOL/L (ref 0.4–2)
LEUKOCYTE ESTERASE UR QL STRIP.AUTO: 75
LYMPHOCYTES # BLD AUTO: 2.43 X10(3) UL (ref 1–4)
LYMPHOCYTES NFR BLD AUTO: 35.8 %
MAGNESIUM SERPL-MCNC: 2.4 MG/DL (ref 1.6–2.6)
MCH RBC QN AUTO: 27.6 PG (ref 26–34)
MCHC RBC AUTO-ENTMCNC: 34.4 G/DL (ref 31–37)
MCV RBC AUTO: 80.1 FL
MONOCYTES # BLD AUTO: 0.74 X10(3) UL (ref 0.1–1)
MONOCYTES NFR BLD AUTO: 10.9 %
NEUTROPHILS # BLD AUTO: 3.36 X10 (3) UL (ref 1.5–7.7)
NEUTROPHILS # BLD AUTO: 3.36 X10(3) UL (ref 1.5–7.7)
NEUTROPHILS NFR BLD AUTO: 49.6 %
NITRITE UR QL STRIP.AUTO: NEGATIVE
OSMOLALITY SERPL CALC.SUM OF ELEC: 301 MOSM/KG (ref 275–295)
PH UR STRIP.AUTO: 6 [PH] (ref 5–8)
PLATELET # BLD AUTO: 191 10(3)UL (ref 150–450)
POTASSIUM SERPL-SCNC: 4.5 MMOL/L (ref 3.5–5.1)
PROT SERPL-MCNC: 7.8 G/DL (ref 6.4–8.2)
PROT UR STRIP.AUTO-MCNC: NEGATIVE MG/DL
RBC # BLD AUTO: 4.57 X10(6)UL
RSV RNA SPEC NAA+PROBE: NEGATIVE
SARS-COV-2 RNA RESP QL NAA+PROBE: NOT DETECTED
SODIUM SERPL-SCNC: 133 MMOL/L (ref 136–145)
SP GR UR STRIP.AUTO: 1.01 (ref 1–1.03)
TROPONIN I SERPL HS-MCNC: 19 NG/L
UROBILINOGEN UR STRIP.AUTO-MCNC: NORMAL MG/DL
WBC # BLD AUTO: 6.8 X10(3) UL (ref 4–11)

## 2024-04-02 PROCEDURE — 71045 X-RAY EXAM CHEST 1 VIEW: CPT | Performed by: STUDENT IN AN ORGANIZED HEALTH CARE EDUCATION/TRAINING PROGRAM

## 2024-04-02 PROCEDURE — 99223 1ST HOSP IP/OBS HIGH 75: CPT | Performed by: HOSPITALIST

## 2024-04-02 PROCEDURE — 70450 CT HEAD/BRAIN W/O DYE: CPT | Performed by: STUDENT IN AN ORGANIZED HEALTH CARE EDUCATION/TRAINING PROGRAM

## 2024-04-02 RX ORDER — NICOTINE POLACRILEX 4 MG
15 LOZENGE BUCCAL
Status: DISCONTINUED | OUTPATIENT
Start: 2024-04-02 | End: 2024-04-03

## 2024-04-02 RX ORDER — ACETAMINOPHEN 500 MG
1000 TABLET ORAL EVERY 6 HOURS PRN
Status: DISCONTINUED | OUTPATIENT
Start: 2024-04-02 | End: 2024-04-03

## 2024-04-02 RX ORDER — METOCLOPRAMIDE HYDROCHLORIDE 5 MG/ML
5 INJECTION INTRAMUSCULAR; INTRAVENOUS EVERY 8 HOURS PRN
Status: DISCONTINUED | OUTPATIENT
Start: 2024-04-02 | End: 2024-04-03

## 2024-04-02 RX ORDER — MELATONIN
3 NIGHTLY PRN
Status: DISCONTINUED | OUTPATIENT
Start: 2024-04-02 | End: 2024-04-03

## 2024-04-02 RX ORDER — ONDANSETRON 2 MG/ML
4 INJECTION INTRAMUSCULAR; INTRAVENOUS EVERY 6 HOURS PRN
Status: DISCONTINUED | OUTPATIENT
Start: 2024-04-02 | End: 2024-04-03

## 2024-04-02 RX ORDER — ATORVASTATIN CALCIUM 40 MG/1
40 TABLET, FILM COATED ORAL NIGHTLY
Status: DISCONTINUED | OUTPATIENT
Start: 2024-04-02 | End: 2024-04-03

## 2024-04-02 RX ORDER — ASPIRIN 81 MG/1
81 TABLET ORAL DAILY
Status: DISCONTINUED | OUTPATIENT
Start: 2024-04-02 | End: 2024-04-03

## 2024-04-02 RX ORDER — METOPROLOL SUCCINATE 25 MG/1
25 TABLET, EXTENDED RELEASE ORAL EVERY EVENING
Status: DISCONTINUED | OUTPATIENT
Start: 2024-04-03 | End: 2024-04-03

## 2024-04-02 RX ORDER — INSULIN ASPART 100 [IU]/ML
INJECTION, SUSPENSION SUBCUTANEOUS
COMMUNITY
Start: 2024-01-04

## 2024-04-02 RX ORDER — SODIUM CHLORIDE 9 MG/ML
1000 INJECTION, SOLUTION INTRAVENOUS ONCE
Status: COMPLETED | OUTPATIENT
Start: 2024-04-02 | End: 2024-04-02

## 2024-04-02 RX ORDER — INSULIN DEGLUDEC 100 U/ML
25 INJECTION, SOLUTION SUBCUTANEOUS NIGHTLY
Status: DISCONTINUED | OUTPATIENT
Start: 2024-04-02 | End: 2024-04-03

## 2024-04-02 RX ORDER — SENNOSIDES 8.6 MG
17.2 TABLET ORAL NIGHTLY PRN
Status: DISCONTINUED | OUTPATIENT
Start: 2024-04-02 | End: 2024-04-03

## 2024-04-02 RX ORDER — PAROXETINE 10 MG/1
10 TABLET, FILM COATED ORAL EVERY MORNING
Status: DISCONTINUED | OUTPATIENT
Start: 2024-04-02 | End: 2024-04-03

## 2024-04-02 RX ORDER — POLYETHYLENE GLYCOL 3350 17 G/17G
17 POWDER, FOR SOLUTION ORAL DAILY PRN
Status: DISCONTINUED | OUTPATIENT
Start: 2024-04-02 | End: 2024-04-03

## 2024-04-02 RX ORDER — PAROXETINE 10 MG/1
10 TABLET, FILM COATED ORAL EVERY MORNING
COMMUNITY

## 2024-04-02 RX ORDER — DEXTROSE MONOHYDRATE 25 G/50ML
50 INJECTION, SOLUTION INTRAVENOUS
Status: DISCONTINUED | OUTPATIENT
Start: 2024-04-02 | End: 2024-04-03

## 2024-04-02 RX ORDER — SODIUM CHLORIDE 9 MG/ML
INJECTION, SOLUTION INTRAVENOUS CONTINUOUS
Status: ACTIVE | OUTPATIENT
Start: 2024-04-02 | End: 2024-04-03

## 2024-04-02 RX ORDER — BENZONATATE 100 MG/1
200 CAPSULE ORAL 3 TIMES DAILY PRN
Status: DISCONTINUED | OUTPATIENT
Start: 2024-04-02 | End: 2024-04-03

## 2024-04-02 RX ORDER — BISACODYL 10 MG
10 SUPPOSITORY, RECTAL RECTAL
Status: DISCONTINUED | OUTPATIENT
Start: 2024-04-02 | End: 2024-04-03

## 2024-04-02 RX ORDER — NICOTINE POLACRILEX 4 MG
30 LOZENGE BUCCAL
Status: DISCONTINUED | OUTPATIENT
Start: 2024-04-02 | End: 2024-04-03

## 2024-04-02 RX ORDER — HEPARIN SODIUM 5000 [USP'U]/ML
5000 INJECTION, SOLUTION INTRAVENOUS; SUBCUTANEOUS EVERY 12 HOURS SCHEDULED
Status: DISCONTINUED | OUTPATIENT
Start: 2024-04-02 | End: 2024-04-03

## 2024-04-02 NOTE — PLAN OF CARE
Problem: Diabetes/Glucose Control  Goal: Glucose maintained within prescribed range  Description: INTERVENTIONS:  - Monitor Blood Glucose as ordered  - Assess for signs and symptoms of hyperglycemia and hypoglycemia  - Administer ordered medications to maintain glucose within target range  - Assess barriers to adequate nutritional intake and initiate nutrition consult as needed  - Instruct patient on self management of diabetes  Outcome: Progressing     Problem: Patient/Family Goals  Goal: Patient/Family Long Term Goal  Description: Patient's Long Term Goal: dc home    Interventions:  - echo  - See additional Care Plan goals for specific interventions  Outcome: Progressing  Goal: Patient/Family Short Term Goal  Description: Patient's Short Term Goal: improved bp    Interventions:   - fluids  - See additional Care Plan goals for specific interventions  Outcome: Progressing

## 2024-04-02 NOTE — H&P
[facility]     Rolando Ferrer Patient Status:  Inpatient    1950 MRN [id number]   Location [facility] 3NE-A Attending Wang Pretty MD   Hosp Day # 0 PCP LUIS ALFREDO SHAFFER     Chief Complaint: Syncope     Subjective:    History of Present Illness:   Rolando Ferrer is a 73 year old male with a near syncopal episode.  Happened earlier this morning.  He did have a slip and fall.  He called 911 is brought to the emergency department.  He denied any precipitating symptoms.  He denies any shortness of breath or chest pain.  Denies any abdominal pain nausea vomiting diarrhea.  He denies fevers or chills dysuria hematuria.    History/Other:    Past Medical History:  Past Medical History:   Diagnosis Date    Atherosclerosis of coronary artery 2021    Cabg X 3    Cataract     Congestive heart disease (HCC)     Coronary atherosclerosis     Diabetes (HCC)     High blood pressure     High cholesterol     Neuropathy     Peripheral vascular disease (HCC)     Renal disorder      Past Surgical History:   Past Surgical History:   Procedure Laterality Date    ANGIOGRAM      BYPASS GRAFT OTHR,AORTO-FEM      BYPASS SURGERY  2021    CABG X 3    FEM/POPL REVASC W/STENT  2021      Family History:   No family history on file.  Social History:    reports that he has never smoked. He has never used smokeless tobacco. He reports that he does not drink alcohol and does not use drugs.   Allergies: No Known Allergies  Medications:    No current facility-administered medications on file prior to encounter.     Current Outpatient Medications on File Prior to Encounter   Medication Sig Dispense Refill    NovoLOG Mix 70/30 FlexPen 100 UNIT/ML Susp Pen-injector (Insulin Aspart Prot & Aspart 70/30) INJECT 20 UNITS UNDER THE SKIN EVERY MORNING AND 25 UNITS UNDER THE SKIN EVERY EVENING AS DIRECTED      PARoxetine 10 MG Oral Tab Take 1 tablet (10 mg total) by mouth every morning.      furosemide 40 MG  Oral Tab Take 1 tablet (40 mg total) by mouth daily. 30 tablet 3    lisinopril 2.5 MG Oral Tab Take 1 tablet (2.5 mg total) by mouth daily. 30 tablet 0    metoprolol succinate 25 MG Oral Tablet 24 Hr Take 1 tablet (25 mg total) by mouth every evening. 30 tablet 3    atorvastatin 40 MG Oral Tab Take 1 tablet (40 mg total) by mouth nightly. 30 tablet 3    aspirin 81 MG Oral Tab EC Take 1 tablet (81 mg total) by mouth daily. 30 tablet 0     Review of Systems:   A comprehensive review of systems was completed.    Pertinent positives and negatives noted in the HPI.    Objective:   Physical Exam:    /63   Pulse 59   Temp 98 °F (36.7 °C) (Temporal)   Resp 12   Ht 5' 8\" (1.727 m)   Wt 175 lb (79.4 kg)   SpO2 98%   BMI 26.61 kg/m²   General: No acute distress, Alert.    Respiratory: No rhonchi, no wheezes  Cardiovascular: S1, S2. Sinus bradycardia   Abdomen: Soft, NT/ND, +BS  Neuro: No new focal deficits  Extremities: No edema    Results:    Labs:    Labs Last 24 Hours:  Recent Labs   Lab 04/02/24  0807   RBC 4.57   HGB 12.6*   HCT 36.6*   MCV 80.1   MCH 27.6   MCHC 34.4   RDW 12.7   NEPRELIM 3.36   WBC 6.8   .0     Recent Labs   Lab 04/02/24  0807   *   BUN 36*   CREATSERUM 2.97*   EGFRCR 21*   CA 9.2   ALB 3.3*   *   K 4.5   CL 98   CO2 26.0   ALKPHO 174*   AST 15   ALT 17   BILT 1.2   TP 7.8     Lab Results   Component Value Date    INR 1.49 (H) 12/31/2021    INR 1.41 (H) 12/21/2021    INR 1.15 12/14/2021     Recent Labs   Lab 04/02/24  0807   TROPHS 19     No results for input(s): \"TROP\", \"PBNP\" in the last 168 hours.  No results for input(s): \"PCT\" in the last 168 hours.  Imaging: Imaging data reviewed in Epic.    Assessment & Plan:      #Syncope- possible vasovagal vs dehydration  -CT brain No acute process   -IVF  -check orthostatics  -telemetry   -ECHO  -hold BP meds-diuretics  -Cardiology consult     #Sinus bradycardia- tele and hold BB  #Lactic acidosis with hypotension- possible  sepsis- empiric Abx and IVF and trending down. F/U UC, Blood Cx   #Possible UTI- abx as above and f/u Cx   #AMADO on CKD- IVF and monitor   #CAD s/p CABG- no acute issue- cards following   #Ischemic cardiomyopathy with chronic HFimpEF, LVEF 50% noted  on TTE 10/2023 - compensated   #PAD- hx left pop artery above knee to PT vein bypass graft 2021   #HTN  #DL  #DM II- hyperglycemic protocol   #Pseudohyponatremia 2/2 hyperglycemia       Quality:  DVT Mechanical Prophylaxis:        DVT Pharmacologic Prophylaxis   Medication   None                Code Status: Not on file  Steinberg: No urinary catheter in place  Steinberg Duration (in days):   Central line:    NUVIA:   Plan of care discussed with patient, staff     Wang Pretty MD  Supplementary Documentation:   The 21st Century Cures Act makes medical notes like these available to patients in the interest of transparency. Please be advised this is a medical document. Medical documents are intended to carry relevant information, facts as evident, and the clinical opinion of the practitioner. The medical note is intended as peer to peer communication and may appear blunt or direct. It is written in medical language and may contain abbreviations or verbiage that are unfamiliar.

## 2024-04-02 NOTE — PLAN OF CARE
NURSING ADMISSION NOTE      Patient admitted via Ambulance  Oriented to room.  Safety precautions initiated.  Bed in low position.  Call light in reach.    Navigator complete  A/Ox4, RA  Tele, SB  QID accucheck, carb control  Pt/ot eval  PIV R hand .9@125mL  PIV R AC, SL  Denies n/v & pain   Updated w/ POC

## 2024-04-02 NOTE — ED INITIAL ASSESSMENT (HPI)
Pt to ER via [facility]. Witnessed syncopal event lasting a few seconds. Happened right after trying to stand up. Family states pt also vomited before syncopal event. Pt denies any pain at this time. States family en route. Denies use of blood thinners.

## 2024-04-02 NOTE — ED PROVIDER NOTES
Patient Seen in: [facility] Emergency Department      History     Chief Complaint   Patient presents with    Syncope     Stated Complaint: syncopal episode witnessed, with emesis, no trauma    Subjective:   HPI    Patient is a 73-year-old male with a history of CABG x 3 presented to the emergency room after reported syncopal episode.  She reportedly cannot sleep all throughout the night.  He was attempting to get on his chair when he slipped fell.  He did hit his head and blacked out for few seconds.  Daughter called 911 afterwards.  Did not have chest pain before but his wife states he may have vomited.  At this time patient denies any symptoms.  Have reportedly been complaining of leg pain this morning.    Objective:   Past Medical History:   Diagnosis Date    Atherosclerosis of coronary artery 12/21/2021    Cabg X 3    Cataract     Congestive heart disease (HCC)     Coronary atherosclerosis     Diabetes (HCC)     High blood pressure     High cholesterol     Neuropathy     Peripheral vascular disease (HCC)     Renal disorder               Past Surgical History:   Procedure Laterality Date    ANGIOGRAM      BYPASS GRAFT OTHR,AORTO-FEM      BYPASS SURGERY  12/21/2021    CABG X 3    CABG      FEM/POPL REVASC W/STENT  02/2021                Social History     Socioeconomic History    Marital status:    Tobacco Use    Smoking status: Never    Smokeless tobacco: Never   Vaping Use    Vaping Use: Never used   Substance and Sexual Activity    Alcohol use: Never    Drug use: Never     Social Determinants of Health     Food Insecurity: No Food Insecurity (4/2/2024)    Food Insecurity     Food Insecurity: Never true   Transportation Needs: No Transportation Needs (4/2/2024)    Transportation Needs     Lack of Transportation: No   Housing Stability: Low Risk  (4/2/2024)    Housing Stability     Housing Instability: No              Review of Systems    Positive for stated complaint: syncopal episode witnessed,  with emesis, no trauma  Other systems are as noted in HPI.  Constitutional and vital signs reviewed.      All other systems reviewed and negative except as noted above.    Physical Exam     ED Triage Vitals [04/02/24 0800]   /56   Pulse 58   Resp 18   Temp 98 °F (36.7 °C)   Temp src Temporal   SpO2 99 %   O2 Device None (Room air)       Current:/81 (BP Location: Left arm)   Pulse 76   Temp 98.3 °F (36.8 °C) (Oral)   Resp 20   Ht 172.7 cm (5' 8\")   Wt 79.4 kg   SpO2 97%   BMI 26.61 kg/m²         Physical Exam  Vitals and nursing note reviewed.   Constitutional:       General: He is not in acute distress.     Appearance: Normal appearance.   HENT:      Head: Normocephalic.      Nose: Nose normal.      Mouth/Throat:      Mouth: Mucous membranes are moist.   Eyes:      Extraocular Movements: Extraocular movements intact.      Pupils: Pupils are equal, round, and reactive to light.   Cardiovascular:      Rate and Rhythm: Normal rate and regular rhythm.      Pulses: Normal pulses.   Pulmonary:      Effort: Pulmonary effort is normal.   Abdominal:      General: Abdomen is flat. Bowel sounds are normal. There is no distension.      Palpations: Abdomen is soft.      Tenderness: There is no abdominal tenderness. There is no right CVA tenderness, left CVA tenderness, guarding or rebound.      Hernia: No hernia is present.   Musculoskeletal:         General: No swelling or tenderness. Normal range of motion.      Cervical back: Normal range of motion.   Skin:     General: Skin is warm and dry.   Neurological:      Mental Status: He is alert and oriented to person, place, and time. Mental status is at baseline.   Psychiatric:         Mood and Affect: Mood normal.               ED Course     Labs Reviewed   COMP METABOLIC PANEL (14) - Abnormal; Notable for the following components:       Result Value    Glucose 405 (*)     Sodium 133 (*)     BUN 36 (*)     Creatinine 2.97 (*)     Calculated Osmolality 301 (*)      eGFR-Cr 21 (*)     Alkaline Phosphatase 174 (*)     Albumin 3.3 (*)     Globulin  4.5 (*)     A/G Ratio 0.7 (*)     All other components within normal limits   LACTIC ACID, PLASMA - Abnormal; Notable for the following components:    Lactic Acid 3.7 (*)     All other components within normal limits   LACTIC ACID REFLEX POST POSTIVE - Abnormal; Notable for the following components:    Lactic Acid 2.3 (*)     All other components within normal limits   URINALYSIS WITH CULTURE REFLEX - Abnormal; Notable for the following components:    Glucose Urine >1000 (*)     Blood Urine Trace (*)     Leukocyte Esterase Urine 75 (*)     WBC Urine 11-20 (*)     Bacteria Urine Rare (*)     All other components within normal limits   HEMOGLOBIN A1C - Abnormal; Notable for the following components:    HgbA1C 15.6 (*)     Estimated Average Glucose 401 (*)     All other components within normal limits   CBC, PLATELET; NO DIFFERENTIAL - Abnormal; Notable for the following components:    HGB 11.4 (*)     HCT 32.8 (*)     MCV 79.0 (*)     All other components within normal limits   POCT GLUCOSE - Abnormal; Notable for the following components:    POC Glucose 439 (*)     All other components within normal limits   POCT GLUCOSE - Abnormal; Notable for the following components:    POC Glucose 284 (*)     All other components within normal limits   POCT GLUCOSE - Abnormal; Notable for the following components:    POC Glucose 330 (*)     All other components within normal limits   POCT GLUCOSE - Abnormal; Notable for the following components:    POC Glucose 316 (*)     All other components within normal limits   POCT GLUCOSE - Abnormal; Notable for the following components:    POC Glucose 202 (*)     All other components within normal limits   CBC W/ DIFFERENTIAL - Abnormal; Notable for the following components:    HGB 12.6 (*)     HCT 36.6 (*)     All other components within normal limits   TROPONIN I HIGH SENSITIVITY - Normal   MAGNESIUM -  Normal   LACTIC ACID REFLEX POST POSITIVE [id number] - Normal   SARS-COV-2/FLU A AND B/RSV BY PCR (GENEXPERT) - Normal    Narrative:     This test is intended for the qualitative detection and differentiation of SARS-CoV-2, influenza A, influenza B, and respiratory syncytial virus (RSV) viral RNA in nasopharyngeal or nares swabs from individuals suspected of respiratory viral infection consistent with COVID-19 by their healthcare provider. Signs and symptoms of respiratory viral infection due to SARS-CoV-2, influenza, and RSV can be similar.    Test performed using the Xpert Xpress SARS-CoV-2/FLU/RSV (real time RT-PCR)  assay on the GeneXpert instrument, [employer], [place] [address].   This test is being used under the Food and Drug Administration's Emergency Use Authorization.    The authorized Fact Sheet for Healthcare Providers for this assay is available upon request from the laboratory.   CBC WITH DIFFERENTIAL WITH PLATELET    Narrative:     The following orders were created for panel order CBC With Differential With Platelet.  Procedure                               Abnormality         Status                     ---------                               -----------         ------                     CBC W/ DIFFERENTIAL[[id number]]          Abnormal            Final result                 Please view results for these tests on the individual orders.   BASIC METABOLIC PANEL (8)   RAINBOW DRAW LAVENDER   RAINBOW DRAW LIGHT GREEN   RAINBOW DRAW BLUE   BLOOD CULTURE   BLOOD CULTURE   URINE CULTURE, ROUTINE     EKG    Rate, intervals and axes as noted on EKG Report.  Rate: 56  Rhythm: Sinus Rhythm  Reading: T wave inversion in leads I and AVL, prior EKG w/o  inversion in lead I                  CT BRAIN OR HEAD (94070)    Result Date: 4/2/2024  CONCLUSION:  1. No acute intracranial pathology. 2. 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    LOCATION:  [facility]   Dictated by (CST): Samuel Trivedi MD on 4/02/2024 at 10:29 AM      Finalized by (CST): Samuel Trivedi MD on 4/02/2024 at 10:31 AM       XR CHEST AP PORTABLE  (CPT=71045)    Result Date: 4/2/2024  CONCLUSION:   Postoperative changes of CABG.  Heart size within normal limits.  The lungs and pleural spaces are clear.   LOCATION:  [facility]      Dictated by (CST): Chuck Lowe MD on 4/02/2024 at 8:20 AM     Finalized by (CST): Chuck Lowe MD on 4/02/2024 at 8:20 AM              MDM      The differential includes the following  Sepsis 2/2 infectious etiology such as UTI, PNA   Tach arrhythmia  Dehydration    Pertinent comorbidities include  As listed above     Pertinent social history includes  As listed above     ER course  Arrival to the emergency room patient's blood pressure soft but did start to trend downward.  He was started on a fluid bolus.  Patient's recount of the story does not sound like he had preceding chest pain but he did have possible vomiting which is concerning.  Patient has been given a liter of fluids however blood pressure now with a slight drop once again.  Patient's map is 65 however his lactic acid is 3.5.  His last echocardiogram from 2021 shows a EF of 31%.   I discussed his case with [facility] who will be doing a further review to see if his EF improved post CABG. patient's x-ray does not show signs of pneumonia but we are still waiting a urinalysis.  His symptoms could be due to volume depletion versus underlying infection.  However most suspicious for underlying sepsis.  blood cultures have already been drawn.    [facility] APN informs me pt most recent EF 50% therefore will provide sepsis fluids 30 ml/kg.  He is already received 1 L fluid bolus will continue remainder of fluids.  Pt admitted and provided empiric abx. BP improving.    Bedside US performed by myself w/o evidence of pericardial effusion, negative fast, and aorta appears normal in size     Labs  Lact 3.7     Imaging studies  I reviewed the images and agree with the radiologist report that showed the following  no evidence of pneumonia on cxr and no evidence of ICH on Cth imaging     External data reviewed  Echo from2021     Discussion of management with external providers  RAMÍREZ NORWOOD  [facility]ist     Admission disposition: 4/2/2024 10:27 AM         A total of 35 minutes of critical care time (exclusive of billable procedures) was administered to manage the patient's critical lab values due to his sepsis.  This involved direct patient intervention, complex decision making, and/or extensive discussions with the patient, family, and clinical staff.    [facility]   part of [facility]      Sepsis Reassessment Note    /81 (BP Location: Left arm)   Pulse 76   Temp 98.3 °F (36.8 °C) (Oral)   Resp 20   Ht 172.7 cm (5' 8\")   Wt 79.4 kg   SpO2 97%   BMI 26.61 kg/m²      I completed the sepsis reassessment at 830    Cardiac:  Regularity: Regular  Rate: Normal  Heart Sounds: S1,S2    Lungs:   Right: Clear  Left: Clear    Peripheral Pulses:  Radial: Right 2+ or Left 2+      Capillary Refill:  <3 Secs    Skin:  Temp/Moisture: Warm and Dry  Color: Normal       Lisseth Dockery MD  4/2/2024  8:32 AM      Medical Decision Making      Disposition and Plan     Clinical Impression:  1. Sepsis due to other etiology (HCC)    2. Hypotension due to hypovolemia         Disposition:  Admit  4/2/2024 10:27 am    Follow-up:  No follow-up provider specified.        Medications Prescribed:  Current Discharge Medication List                            Hospital Problems       Present on Admission  Date Reviewed: 11/5/2022            ICD-10-CM Noted POA    * (Principal) Sepsis due to other etiology (HCC) A41.89 4/2/2024 Unknown    DM (diabetes mellitus), secondary, uncontrolled, with hyperosmolarity (HCC) E13.00 4/2/2024 Unknown    Hypotension due to hypovolemia E86.1 4/2/2024 Unknown    Sinus bradycardia R00.1 4/2/2024 Unknown

## 2024-04-02 NOTE — ED QUICK NOTES
Orders for admission, patient is aware of plan and ready to go upstairs. Any questions, please call ED RN Carolyne at extension [phone].     Patient Covid vaccination status: Partially vaccinated     COVID Test Ordered in ED: SARS-CoV-2/Flu A and B/RSV by PCR (GeneXpert)    COVID Suspicion at Admission: N/A    Running Infusions:    sodium chloride 1,000 mL (04/02/24 0935)    Bag 3/3 hanging for sepsis bolus      Mental Status/LOC at time of transport: AOx4    Other pertinent information:   CIWA score: N/A   NIH score:  N/A

## 2024-04-02 NOTE — HISTORICAL OFFICE NOTE
[facility]  [address] [place] [address], 4th floor, [place] [address]  [phone]      Rolando Ferrer  Progress Note  Demographics:  Name: Rolando Ferrer YOB: 1950  Age: 73, Male Medical Record No: [id number]  Visited Date/Time: 10/17/2023 02:50 PM    Chief Complaints  4 month follow up/ follow up on testing   AAA, Lower ext art US, CRISS, 10/3   Labs 6/21  History of Present Illness  Patient is here for follow-up visit.  He feels great.  He walks everywhere without any problems.  There is no chest pain, shortness of breath or leg pain.  There are no wounds or ulcers.  Cardiac risk factors Diabetes, Hypertension and Never smoked  Past Medical History  1.Abnormal EKG  2.S/P CABG (coronary artery bypass graft)  3.PAD both legs - (atherosclerosis native extremities)  4.Abscess of left foot  5.PVD (peripheral vascular disease)  6.Hypertension (HTN), primary  7.CAD native (coronary artery disease)  8.PCO (posterior capsular opacification), right  9.Type 2 diabetes mellitus with both eyes affected by mild nonproliferative retinopathy without macular edema, with long-term current use of insulin  10.Macular pucker, bilateral  11.Chronic kidney disease  12.Osteomyelitis  Past Surgical History  1.Status post coronary artery bypass graft  Family History  No significant family history noted.  Social History  Smoking status Never smoked  Tobacco usage - No (Non-smoker for personal reasons (finding))  Review of systems  Constitutional No history of Weight Loss  Eyes No history of Blurry vision  ENT No history of Bloody nose (epistaxis)  Gastrointestinal No history of Abdominal pain  Cardiovascular No history of Chest pain, ALTAMIRANO, Palpitations, Syncope, PND, Orthopnea, Edema and Claudication  Genitourinary No history of Dysuria  Musculoskeletal No history of Myalgias  Neurological No history of Migraines  Endocrine No history of Polyuria  Hem/Lymphatic No history of Easy bruising  Integumentary No  history of Rashes  Physical Examination  Vitals Right Arm Sitting  / 80 mmHg, Pulse rate 61 bpm, Height in 5' 8\", BMI: 27.8, Weight in 183 lbs (or) 83.01 kgs and BSA : 2.01 cc/m²  General Appearance No Acute Distress  Cardiovascular   EKG/Other abnormalities  HEENT: EOMI, mucosa moist  Lungs: clear  CV: RRR  Abd: soft  Ext: no edema  Neuro: A&O  Allergies  No medication allergies noted.  Medications  1.aspirin 81 MG Oral Tab EC, Take 1 tablet (81 mg total) by mouth daily.  2.ATORVASTATIN 40 MG TABLET, TAKE 1 TABLET BY MOUTH EVERY DAY  3.clopidogrel 75 MG Oral Tab, Take 75 mg by mouth daily.  4.FUROSEMIDE 40 MG TABLET, TAKE 1 TABLET BY MOUTH EVERY DAY  5.insulin NPH (HUMULIN N, NOVOLIN N) 100 UNIT/ML injectable suspension  6.LISINOPRIL 2.5 MG TABLET, TAKE 1 TABLET BY MOUTH EVERY DAY  7.metoprolol succinate ER 25 mg tablet,extended release 24 hr, Take 1 tablet orally once a day.  8.NOVOLOG MIX 70/30 FLEXPEN (70-30) 100 UNIT/ML pen-injector, INJECT 20 UNITS IN THE MORNING AND 25 UNITS AT BEDTIME AS DIRECETED  Impression  1.Cardiomyopathy, Ischemic - CMO  2.Status post coronary artery bypass graft  3.Hyperlipidemia (unspecified)  4.History of renal insufficiency  5.PAD both legs - (atherosclerosis native extremities)  6.Hypertension (HTN), primary  7.CAD native (coronary artery disease)  Assessment & Plan  Patient doing well.  He is status post bypass.  I reviewed his ultrasound with him.  His echo now reveals low normal LV function is a significant improvement from previous.    He is not in heart failure.  We will continue with his present medication regimen.    His aortoiliac ultrasounds revealed no stenosis.  He has moderate stenosis in his lower extremities with a patent graft.  It appears no different than previous.    We will continue with risk factor modification.  His LDL is at goal.  He will continue with his present regimen.  I will see him back in about 8 months.  He will have a repeat echo and lower  extremity ultrasound prior to his next visit.  Labs and Diagnostics ordered  1.Echocardiography - Complete (6 Months)  2.Arterial Lower Ultrasound (bilateral) (6 Months)  3.CRISS (6 Months)  Future appointments  1.Referral Visit - Maria Guadalupe Lainez [email or web address]) : (Today)  2.Follow up visit - Chaparro Solorzano follow up in june (Schedule next available)  Miscellaneous  1.Reviewed ankle brachial index, lower extremity arterial ultrasound, abdominal aorta, trans thoracic echocardiogram with the patient.  2.Weight monitoring (regime/therapy)  Nurses documentation  Upcoming surgeries: none   Use of assistive devices(s): none  EKG: not done  Refills: done   (TD, RN)   Patient instructions  Complete an echo and lower extremity arterial ultrasound with CRISS prior to follow up  Follow up in 6-8 months  Diagnostics Details  Ankle Brachial Index 10/03/2023  1.The study quality is average.    2.The resting right ankle brachial index is 0.69 consistent with mild to moderate right peripheral vascular disease.    3.Left CRISS pressures not taken due to graft.    4.Right toe brachial index suggests moderate/severe small vessel disease. Left toe brachial index within normal range.    Lower Extremity Arterial Ultrasound 10/03/2023  1.The study quality is good.    2.Right mid superficial femoral artery 50-74% stenosis    3.Right tibioperoneal trunk occluded.    4.Right posterior tibial artery proximal-distal occluded. Renconstitutes near ankle    5.Right peroneal artery proximal-distal occluded. Reconstitutes distally.    6.Right anterior tibial artery multiple 50-74% with shadowing. Cannot rule out missed stenosis/occlusion.    7.Left less than 50% stenosis common femoral artery - distal superficial femoral artery.    8.Left patent above knee to distal posterior tibial artery bypass with 50-74% stenosis at the distal superficial femoral artery inflow artery    9.Left peroneal artery occluded. Left Posterior tibial artery  retrograde flow noted proximal to the distal anastomosis. This vessel is patent with antegrade flow distal to graft.    10.Left proximal anterior tibial artery occluded. Retrogade flow noted distally.    Abdominal Aorta 10/03/2023  1.The study quality is average. Technically challenging due to overlying bowel gas.    2.No evidence of abdominal aorta aneurysm or ectasia.    3.Plaque noted throughout abdominal aorta-iliac arterial system without significant stenosis.    Trans Thoracic Echocardiogram 10/02/2023  1.The study quality is good.    2.The left ventricle is normal in size. Global left ventricular systolic function is borderline normal. The left ventricle diastolic function is impaired (Grade I) with normal left atrial pressure. The wall thickness is within normal limits. The left ventricular ejection fraction is 50%. Mild hypokinesis is noted at apex.    3.The left atrial diameter is mildly increased. Left atrial diameter is 4.1 cms.    ACTMonitoring 05/12/2022  1.This is a good quality study.    2. No patient diary was submitted.    3.Predominant rhythm is normal sinus rhythm.    4.The minimum heart rate recorded was 48 beats / minute (sinus bradycardia, 5/16/2022). The maximum heart rate is 110 beats / minute (sinus tachycardia, 5/18/2022). The mean heart rate is 62 beats / minute.    5.The atrial fibrillation burden is 0%.    6.One 5 beat atrial run, no symptoms.    CPOE Orders carried out by: Ariadne Rondon RN  Care Providers: Chaparro Solorzano MD, Shima Farias and Ariadne Rondon RN  Electronically Authenticated by  Chaparro Solorzano MD  10/18/2023 04:19:00 PM  Disclaimer: Components of this note were documented using voice recognition system and are subject to errors not corrected at proofreading. Contact the author of this note for any clarifications.

## 2024-04-02 NOTE — IMAGING NOTE
[facility] Echo 10/2/2023  1. The study quality is good.   2. The left ventricle is normal in size. Global left ventricular systolic function is borderline normal. The left ventricle diastolic function is impaired (Grade I) with normal left atrial pressure. The wall thickness is within normal limits. The left ventricular ejection fraction is 50%. Mild hypokinesis is noted at apex.  3. The left atrial diameter is mildly increased. Left atrial diameter is 4.1 cms.          [facility] CRISS 10/3/2023     1. The study quality is average.   2. The resting right ankle brachial index is 0.69 consistent with mild to moderate right peripheral vascular disease.  3. Left CRISS pressures not taken due to graft.    4. Right toe brachial index suggests moderate/severe small vessel disease.  Left toe brachial index within normal range.        [facility] Arterial duplex 10/3/2023      1. The study quality is good.   2. Right mid superficial femoral artery 50-74% stenosis  3. Right tibioperoneal trunk occluded.   4. Right posterior tibial artery proximal-distal occluded.  Renconstitutes near ankle  5. Right peroneal artery proximal-distal occluded.  Reconstitutes distally.  6. Right anterior tibial artery multiple 50-74% with shadowing.  Cannot rule out missed stenosis/occlusion.   7. Left less than 50% stenosis common femoral artery - distal superficial femoral artery.   8. Left patent above knee to distal posterior tibial artery bypass with 50-74% stenosis at the distal superficial femoral artery inflow artery  9. Left peroneal artery occluded.  Left Posterior tibial artery retrograde flow noted proximal to the distal anastomosis.  This vessel is patent with antegrade flow distal to graft.  10. Left proximal anterior tibial artery occluded.  Retrogade flow noted distally.

## 2024-04-02 NOTE — CONSULTS
Cardiology Consultation      Rolando Ferrer Patient Status:  Emergency    1950 MRN [id number]   [facility] EMERGENCY DEPARTMENT Attending Lisseth Dockery MD   Hosp Day # 0 PCP LUIS ALFREDO SHAFFER     Reason for Consultation:  Presyncope    History of Present Illness:  Rolando Ferrer is a(n) 73 year old male with chronic medical conditions including HTN, DM, CAD s/p CABG, PAD, ischemic cardiomyopathy, HLD who presents with complaint of fall after being unable to sleep overnight.  Patient cannot identify why he has difficulty sleeping but states it does occasionally happen.  He did also complain of cramping in his legs overnight.  His wife who is present at bedside notes that for a few seconds he was not quite with it but did not note him losing consciousness.  Patient states that he has been feeling well up until early this morning.  Stays very active with walking around the house in the wintertime and notes no difficulty with chest discomfort, shortness of breath, unexpected weight gain, palpitations, lightheadedness, syncope.  Patient has been compliant with medications.  Denies fevers, chills, cough, difficulty urinating, diarrhea, abdominal pain.  Does not note decreased p.o. intake.  Cardiology is asked to evaluate given presyncopal presentation as well as prior cardiac history.    History:  Past Medical History:   Diagnosis Date    Atherosclerosis of coronary artery 2021    Cabg X 3    Cataract     Congestive heart disease (HCC)     Coronary atherosclerosis     Diabetes (HCC)     High blood pressure     High cholesterol     Neuropathy     Peripheral vascular disease (HCC)     Renal disorder      Past Surgical History:   Procedure Laterality Date    ANGIOGRAM      BYPASS GRAFT OTHR,AORTO-FEM      BYPASS SURGERY  2021    CABG X 3    FEM/POPL REVASC W/STENT  2021     No family history on file.   reports that he has never smoked. He has never used smokeless tobacco. He reports that he does  not drink alcohol and does not use drugs.    Allergies:  No Known Allergies    Medications:    Current Facility-Administered Medications:     piperacillin-tazobactam (Zosyn) 4.5 g in dextrose 5% 100 mL IVPB-ADDV, 4.5 g, Intravenous, Once    sodium chloride 0.9 % IV bolus 2,382 mL, 30 mL/kg, Intravenous, Continuous    Review of Systems:  A comprehensive review of systems was negative if not otherwise mention in above HPI.    /55   Pulse 50   Temp 98 °F (36.7 °C) (Temporal)   Resp 12   Ht 5' 8\" (1.727 m)   Wt 175 lb (79.4 kg)   SpO2 100%   BMI 26.61 kg/m²   Temp (24hrs), Av °F (36.7 °C), Min:98 °F (36.7 °C), Max:98 °F (36.7 °C)       Intake/Output Summary (Last 24 hours) at 2024 1008  Last data filed at 2024 0858  Gross per 24 hour   Intake 1000 ml   Output --   Net 1000 ml     Wt Readings from Last 3 Encounters:   24 175 lb (79.4 kg)   22 (P) 154 lb (69.9 kg)   22 165 lb (74.8 kg)       Physical Exam:   General: Alert and oriented x 3. No apparent distress. No respiratory or constitutional distress.  HEENT: Normocephalic, anicteric sclera, neck supple.  Neck: No JVD  Cardiac: Regular rate and rhythm. S1, S2 normal. No murmur, pericardial rub, S3.  Lungs: Clear to auscultation anteriorly.  Abdomen: Soft, non-tender. BS-present.  Extremities: Without clubbing, cyanosis or edema.  Neurologic: Alert and oriented, normal affect.  Skin: Warm and dry.     Laboratory Data:  Lab Results   Component Value Date    WBC 6.8 2024    HGB 12.6 2024    HCT 36.6 2024    .0 2024    CREATSERUM 2.97 2024    BUN 36 2024     2024    K 4.5 2024    CL 98 2024    CO2 26.0 2024     2024    CA 9.2 2024    ALB 3.3 2024    ALKPHO 174 2024    BILT 1.2 2024    TP 7.8 2024    AST 15 2024    ALT 17 2024    PGLU 439 2024       Imaging/results:  HS troponin 19  Lactate 3.7   CXR  - No acute cardiopulm disease   CT brain - pending   EKG - sinus anthony - ST/T changes lateral leads, mildly worse than prior     Trans Thoracic Echocardiogram 10/02/2023  1.The study quality is good.  2.The left ventricle is normal in size. Global left ventricular systolic function is borderline normal. The left ventricle diastolic function is impaired (Grade I) with normal left atrial pressure. The wall thickness is within normal limits. The left ventricular ejection fraction is 50%. Mild hypokinesis is noted at apex.  3.The left atrial diameter is mildly increased. Left atrial diameter is 4.1 cms.      Assessment:  Presyncope  AMADO on CKD - suspect dehydration, possibly exacerbated by oral diuretic use in the setting of improved LV systolic function  Ischemic cardiomyopathy with chronic HFimpEF, LVEF 50% noted  on TTE 10/2023   CAD s/p CABG 12/21 (LIMA to LAD, SVG to OM and RPL)  Hx repair of fascial dehiscence, drainage of bilateral pleural effusion, bilateral chest tube and mediastinal pleurX placement   PAD - hx left pop artery above knee to PT vein bypass graft 2021  HTN  HLD  DM2      Plan:  R/o infectious etiology  BP improving with IV fluid hydration.  Continue IVF hydration - repeat lactate   Hold lisinopril and furosemide -- may need down-titration of diuretics   Check magnesium   Tele monitoring for arrhythmia   ECHO   Further recs to follow        Thank you for allowing me to participate in the care of your patient.      Harshad Lobo DO  Cardiologist  [facility]  4/2/2024 10:08 AM      Note to the patient: The 21st Century Cures Act makes medical notes like these available to patients in the interest of transparency. However, be advised that this is a medical document. It is intended as peer to peer communication. It is written in medical language and may contain abbreviations or verbiage that are unfamiliar. It may appear blunt or direct. Medical documents are intended to carry  relevant information, facts as evident, and clinical opinion of the practitioner.     Disclaimer: Components of this note were documented using voice recognition system and are subject to errors not corrected at proofreading. Contact the author of this note for any clarifications.

## 2024-04-03 ENCOUNTER — APPOINTMENT (OUTPATIENT)
Dept: CV DIAGNOSTICS | Facility: HOSPITAL | Age: 74
End: 2024-04-03
Attending: STUDENT IN AN ORGANIZED HEALTH CARE EDUCATION/TRAINING PROGRAM
Payer: MEDICARE

## 2024-04-03 VITALS
DIASTOLIC BLOOD PRESSURE: 84 MMHG | OXYGEN SATURATION: 97 % | RESPIRATION RATE: 18 BRPM | BODY MASS INDEX: 26.52 KG/M2 | WEIGHT: 175 LBS | TEMPERATURE: 98 F | SYSTOLIC BLOOD PRESSURE: 172 MMHG | HEART RATE: 75 BPM | HEIGHT: 68 IN

## 2024-04-03 LAB
ANION GAP SERPL CALC-SCNC: 4 MMOL/L (ref 0–18)
ATRIAL RATE: 56 BPM
BUN BLD-MCNC: 25 MG/DL (ref 9–23)
CALCIUM BLD-MCNC: 8.6 MG/DL (ref 8.5–10.1)
CHLORIDE SERPL-SCNC: 110 MMOL/L (ref 98–112)
CO2 SERPL-SCNC: 26 MMOL/L (ref 21–32)
CREAT BLD-MCNC: 2.05 MG/DL
EGFRCR SERPLBLD CKD-EPI 2021: 33 ML/MIN/1.73M2 (ref 60–?)
ERYTHROCYTE [DISTWIDTH] IN BLOOD BY AUTOMATED COUNT: 13 %
GLUCOSE BLD-MCNC: 190 MG/DL (ref 70–99)
GLUCOSE BLD-MCNC: 202 MG/DL (ref 70–99)
GLUCOSE BLD-MCNC: 237 MG/DL (ref 70–99)
GLUCOSE BLD-MCNC: 279 MG/DL (ref 70–99)
HCT VFR BLD AUTO: 32.8 %
HGB BLD-MCNC: 11.4 G/DL
MCH RBC QN AUTO: 27.5 PG (ref 26–34)
MCHC RBC AUTO-ENTMCNC: 34.8 G/DL (ref 31–37)
MCV RBC AUTO: 79 FL
OSMOLALITY SERPL CALC.SUM OF ELEC: 299 MOSM/KG (ref 275–295)
P AXIS: 75 DEGREES
P-R INTERVAL: 158 MS
PLATELET # BLD AUTO: 161 10(3)UL (ref 150–450)
POTASSIUM SERPL-SCNC: 4.2 MMOL/L (ref 3.5–5.1)
Q-T INTERVAL: 414 MS
QRS DURATION: 80 MS
QTC CALCULATION (BEZET): 399 MS
R AXIS: 10 DEGREES
RBC # BLD AUTO: 4.15 X10(6)UL
SODIUM SERPL-SCNC: 140 MMOL/L (ref 136–145)
T AXIS: 117 DEGREES
VENTRICULAR RATE: 56 BPM
WBC # BLD AUTO: 7.5 X10(3) UL (ref 4–11)

## 2024-04-03 PROCEDURE — 99239 HOSP IP/OBS DSCHRG MGMT >30: CPT | Performed by: HOSPITALIST

## 2024-04-03 PROCEDURE — 93306 TTE W/DOPPLER COMPLETE: CPT | Performed by: STUDENT IN AN ORGANIZED HEALTH CARE EDUCATION/TRAINING PROGRAM

## 2024-04-03 NOTE — CM/SW NOTE
04/03/24 1400   CM/SW Referral Data   Referral Source Social Work (self-referral)   Reason for Referral Discharge planning   Informant Patient;EMR;Clinical Staff Member   Medical Hx   Does patient have an established PCP? Yes   Patient Info   Patient's Current Mental Status at Time of Assessment Alert;Oriented   Patient's Home Environment House   Number of Levels in Home 2   Patient lives with Spouse/Significant other   Patient Status Prior to Admission   Independent with ADLs and Mobility Yes   Discharge Needs   Anticipated D/C needs No anticipated discharge needs     HOME SITUATION  Type of Home: House  Home Layout: Two level  Lives With: Spouse     Toilet and Equipment: Standard height toilet  Shower/Tub and Equipment: Walk-in shower;Tub-shower combo  Other Equipment: None     Occupation/Status:   Drives: Yes  Patient Regularly Uses: Reading glasses     Prior Level of Function per OT eval: IND with I/ADLs and functional mobility    Pt is a 72 y/o male admitted with sepsis due to other etiology. Notified by PT of anticipated need for home health services at discharge. HH referrals sent in AIDIN. FILI met with pt who is alert and oriented at bedside to discuss discharge planning.     Pt lives with his spouse and is independent with ADLs. Pt stated he has family at home that is able to assist if needed. Pt stated he still drives. Discussed HH services, pt declined. Pt denied any further needs at this time.     HH referral cancelled in AIDIN. FILI will continue to remain available.     VERA Kong  Discharge Planner

## 2024-04-03 NOTE — PROGRESS NOTES
Progress Note  Rolando Ferrer Patient Status:  Inpatient    1950 MRN [id number]   Location [facility] 3NE-A Attending Kam Hunt,    Hosp Day # 1 PCP LUIS ALFREDO SHAFFER     Subjective:  Mr. Ferrer feels improved. Will be ambulating shortly.  Denies dizziness, chest pain, palpitations, or syncope.    Objective:  Physical Exam:   /83 (BP Location: Left arm)   Pulse 61   Temp 98.6 °F (37 °C) (Oral)   Resp 18   Ht 5' 8\" (1.727 m)   Wt 175 lb (79.4 kg)   SpO2 97%   BMI 26.61 kg/m²   Temp (24hrs), Av.1 °F (36.7 °C), Min:97.4 °F (36.3 °C), Max:98.6 °F (37 °C)       Intake/Output Summary (Last 24 hours) at 4/3/2024 1007  Last data filed at 4/3/2024 0440  Gross per 24 hour   Intake --   Output 500 ml   Net -500 ml     Wt Readings from Last 3 Encounters:   24 175 lb (79.4 kg)   22 (P) 154 lb (69.9 kg)   22 165 lb (74.8 kg)     Telemetry: NSR in the 70's  General: Alert and oriented in no apparent distress lying comfortably in bed.  HEENT: No focal deficits.  Neck: No JVD, carotids 2+ no bruits.  Cardiac: Regular rate and rhythm, S1, S2 normal, no murmur, rub or gallop.  Lungs: Clear without wheezes, rales, rhonchi or dullness.  Normal excursions and effort on room air  Abdomen: Soft, non-tender.   Extremities: Without clubbing, cyanosis or edema.  Peripheral pulses are 2+.  Neurologic: Alert and oriented, normal affect.  Skin: Warm and dry.        Intake/Output:    Intake/Output Summary (Last 24 hours) at 4/3/2024 1007  Last data filed at 4/3/2024 0440  Gross per 24 hour   Intake --   Output 500 ml   Net -500 ml       Last 3 Weights   24 0800 175 lb (79.4 kg)   22 1500 (P) 154 lb (69.9 kg)   22 0802 165 lb (74.8 kg)       Labs:  Recent Labs   Lab 24  0807 24  0611   * 190*   BUN 36* 25*   CREATSERUM 2.97* 2.05*   EGFRCR 21* 33*   CA 9.2 8.6   * 140   K 4.5 4.2   CL 98 110   CO2 26.0 26.0     Recent Labs   Lab 24  0807 24  0611    RBC 4.57 4.15   HGB 12.6* 11.4*   HCT 36.6* 32.8*   MCV 80.1 79.0*   MCH 27.6 27.5   MCHC 34.4 34.8   RDW 12.7 13.0   NEPRELIM 3.36  --    WBC 6.8 7.5   .0 161.0         Recent Labs   Lab 04/02/24  0807   TROPHS 19       Diagnostics:   ECHOCARDIOGRAM 4/2/2024:  PENDING              Medications:   aspirin  81 mg Oral Daily    atorvastatin  40 mg Oral Nightly    [Held by provider] metoprolol succinate ER  25 mg Oral QPM    PARoxetine  10 mg Oral QAM    piperacillin-tazobactam  3.375 g Intravenous Q8H    heparin  5,000 Units Subcutaneous 2 times per day    insulin degludec  25 Units Subcutaneous Nightly    insulin aspart  1-68 Units Subcutaneous TID CC    insulin aspart  1-10 Units Subcutaneous TID AC and HS       LVEF: 50%  BB: metoprolol 25 mg daily held due to anthony  ACE/ARB/ARNI: lisinopril 2.5 mg daily held AMADO  MRNA: consider outpatient. LVEF improved  SGLT2-inhibitor: LVEF improved, consider outpatient  ICD/Device: no  Discharge/dry weight: pending  Cardiomems candidate: no  Heart Failure Clinic Referral Placed: no  Inpatient HF orderset:  no admission for dehydration not CHF    Assessment/Plan:  Presyncope  AMADO on CKD  Improving with rehydration therapy  Ischemic cardiomyopathy HFimpEF LVEF 50% TTE 10 /2023  Rpt echocardiogram pending  Suspect will require less diuretics at discharge given improved LVEF  CAD s/p CABG 12/21 (LIMA to LAD, SVG to OM, and RPL)  No anginal symptoms  Hx of repair of fascial dehiscence, drainage of bilateral pleural effusion, bilateral chest tube and mediastinal pleurX placement  PAD  Hx of L popliteal artery above knee to PT vein bypass graft  HTN  BP near goal  HLD  Statin  DM II  UTI  Abx per primary team    PLAN  Agree with rehydration therapy  Likely will require reduced dose of diuretics or PRN diuretics at discharge  Restart lisinopril once Cr normalizes/stabilizes  Hold metoprolol with sinus bradycardia in the 50-60's off medication  Continue ASA and statin  therapy    Joe Landa PA-C  4/3/2024  10:07 AM      Patient seen and examined independently.  Note reviewed and labs reviewed. Agree with above assessment and plan.  73-year-old male who presented with presyncope and AMADO, likely secondary to dehydration.  Renal function is improving after IV fluid hydration.  Currently holding lisinopril and furosemide.  Patient is symptomatically and clinically improved this morning.  Echo is pending but was previously noted to have improving LV systolic function.  Therefore, if echo is stable, will likely recommend decreasing diuretic dosage at discharge.  Possible discharge this evening from cardiology perspective with outpatient follow-up.        Harshad oLbo DO  Cardiologist  [facility]  4/3/2024 12:02 PM      Note to the patient: The 21st Century Cures Act makes medical notes like these available to patients in the interest of transparency. However, be advised that this is a medical document. It is intended as peer to peer communication. It is written in medical language and may contain abbreviations or verbiage that are unfamiliar. It may appear blunt or direct. Medical documents are intended to carry relevant information, facts as evident, and clinical opinion of the practitioner.     Disclaimer: Components of this note were documented using voice recognition system and are subject to errors not corrected at proofreading. Contact the author of this note for any clarifications.

## 2024-04-03 NOTE — PHYSICAL THERAPY NOTE
PHYSICAL THERAPY EVALUATION - INPATIENT     Room Number: 3614/3614-A  Evaluation Date: 4/3/2024  Type of Evaluation: Initial  Physician Order: PT Eval and Treat    Presenting Problem: Sepsis due to other etiology  Co-Morbidities : CABG x3, HTN, DM, CAD, PAD, HLD, ischemic cardiomyopathy  Reason for Therapy: Mobility Dysfunction and Discharge Planning    PHYSICAL THERAPY ASSESSMENT   Patient is currently functioning near baseline with bed mobility, transfers, and gait.  Prior to admission, patient's baseline is IND with all ADLs and mobility.  Patient is requiring supervision as a result of the following impairments: decreased endurance/aerobic capacity, decreased muscular endurance, and hypotensive .  Physical Therapy will continue to follow for duration of hospitalization.    Patient will benefit from continued skilled PT Services For duration of hospitalization, however, given the patient is functioning near baseline level do not anticipate skilled therapy needs at discharge .    PLAN     Rehab Potential : Good  Frequency (Obs): 3-5x/week  Number of Visits to Meet Established Goals: 3      CURRENT GOALS    Goal #1 Patient is able to ambulate 150 feet with assist device: walker - rolling at assistance level: modified independent     Goal #2 Patient is able to perform 12 steps with bilateral railings with Supervision     Goal #3      Goal #4    Goal #5    Goal #6    Goal Comments: Goals established on 4/3/2024      PHYSICAL THERAPY MEDICAL/SOCIAL HISTORY  History related to current admission: Patient is a 73 year old male admitted on 4/2/2024 from Home  for Syncope episode and fall.  Pt diagnosed with Sepsis due to other etiology.      HOME SITUATION  Type of Home: House   Home Layout: Two level  Stairs to Enter : 2     Stairs to Bedroom: 14  Railing: Yes    Lives With: Spouse  Drives: Yes  Patient Owned Equipment: Cane  Patient Regularly Uses: Reading glasses    Prior Level of [place]: Pt states that he  lives in a house with his wife. Pt reports that he is IND with all ADLs an mobility.    SUBJECTIVE  \"I feel fine\"  \"I love to walk\"      OBJECTIVE  Precautions: Cardiac;Bed/chair alarm  Fall Risk: High fall risk    WEIGHT BEARING RESTRICTION  Weight Bearing Restriction: None                PAIN ASSESSMENT  Ratin  Location: Pt reported no pain       COGNITION  Overall Cognitive Status:  WFL - within functional limits    RANGE OF MOTION AND STRENGTH ASSESSMENT  Upper extremity ROM and strength are within functional limits     Lower extremity ROM is within functional limits     Lower extremity strength is within functional limits       BALANCE  Static Sitting: Good  Dynamic Sitting: Good  Static Standing: Fair +  Dynamic Standing: Fair +    ADDITIONAL TESTS                                    ACTIVITY TOLERANCE                         O2 WALK       NEUROLOGICAL FINDINGS                        AM-PAC '6-Clicks' INPATIENT SHORT FORM - BASIC MOBILITY  How much difficulty does the patient currently have...  Patient Difficulty: Turning over in bed (including adjusting bedclothes, sheets and blankets)?: None   Patient Difficulty: Sitting down on and standing up from a chair with arms (e.g., wheelchair, bedside commode, etc.): None   Patient Difficulty: Moving from lying on back to sitting on the side of the bed?: None   How much help from another person does the patient currently need...   Help from Another: Moving to and from a bed to a chair (including a wheelchair)?: None   Help from Another: Need to walk in hospital room?: A Little   Help from Another: Climbing 3-5 steps with a railing?: A Little       AM-PAC Score:  Raw Score: 22   Approx Degree of Impairment: 20.91%   Standardized Score (AM-PAC Scale): 53.28   CMS Modifier (G-Code): CJ    FUNCTIONAL ABILITY STATUS  Gait Assessment   Functional Mobility/Gait Assessment  Gait Assistance: Supervision  Distance (ft): 150  Assistive Device: None  Pattern: Within  Functional Limits    Skilled Therapy Provided     Bed Mobility:  Rolling: NT  Supine to sit: IND   Sit to supine: IND     Transfer Mobility:  Sit to stand: IND   Stand to sit: IND   Gait = Supervision 150ft using no assisted device    Therapist's Comments: Pt's BP was monitored  with /92 Supine, 121/76Sitting 129/80 sitting after 5 mins, 119/57 standing, 96/72 sitting after walking, 119/7 sitting. Pt was not symptomatic during session. RN was notified. Pt can benefit with further PT treat to continue to improve ambulation distance and attempt stairs.     Exercise/Education Provided:  Bed mobility  Body mechanics  Gait training  Transfer training    Patient End of Session: Up in chair;Needs met;Call light within reach;RN aware of session/findings;All patient questions and concerns addressed      Patient Evaluation Complexity Level:  History Moderate - 1 or 2 personal factors and/or co-morbidities   Examination of body systems Low - addressing 1-2 elements   Clinical Presentation Low - Stable   Clinical Decision Making Low - Stable       PT Session Time: 23 minutes  Therapeutic Activity: 15 minutes

## 2024-04-03 NOTE — PAYOR COMM NOTE
--------------  ADMISSION REVIEW   4/2  Payor: JOZEF NERI [facility]  Subscriber #:  [id number]  Authorization Number: [id number]    Admit date: 4/2/24  Admit time: 11:46 AM       REVIEW DOCUMENTATION:  ED Provider Notes signed by Lisseth Dockery MD at 4/3/2024  7:00 AM    Patient Seen in: [facility] Emergency Department  History     Chief Complaint   Patient presents with    Syncope     Stated Complaint: syncopal episode witnessed, with emesis, no trauma    Subjective:   HPI    Patient is a 73-year-old male with a history of CABG x 3 presented to the emergency room after reported syncopal episode.  She reportedly cannot sleep all throughout the night.  He was attempting to get on his chair when he slipped fell.  He did hit his head and blacked out for few seconds.  Daughter called 911 afterwards.  Did not have chest pain before but his wife states he may have vomited.  At this time patient denies any symptoms.  Have reportedly been complaining of leg pain this morning.    Objective:   Past Medical History:   Diagnosis Date    Atherosclerosis of coronary artery 12/21/2021    Cabg X 3    Cataract     Congestive heart disease (HCC)     Coronary atherosclerosis     Diabetes (HCC)     High blood pressure     High cholesterol     Neuropathy     Peripheral vascular disease (HCC)     Renal disorder    Positive for stated complaint: syncopal episode witnessed, with emesis, no trauma  Other systems are as noted in HPI.  Constitutional and vital signs reviewed.      All other systems reviewed and negative except as noted above.    Physical Exam     ED Triage Vitals [04/02/24 0800]   /56   Pulse 58   Resp 18   Temp 98 °F (36.7 °C)   Temp src Temporal   SpO2 99 %   O2 Device None (Room air)       Current:/81 (BP Location: Left arm)   Pulse 76   Temp 98.3 °F (36.8 °C) (Oral)   Resp 20   Ht 172.7 cm (5' 8\")   Wt 79.4 kg   SpO2 97%   BMI 26.61 kg/m²     Physical Exam  Vitals and nursing note reviewed.   Constitutional:        General: He is not in acute distress.     Appearance: Normal appearance.   HENT:      Head: Normocephalic.      Nose: Nose normal.      Mouth/Throat:      Mouth: Mucous membranes are moist.   Eyes:      Extraocular Movements: Extraocular movements intact.      Pupils: Pupils are equal, round, and reactive to light.   Cardiovascular:      Rate and Rhythm: Normal rate and regular rhythm.      Pulses: Normal pulses.   Pulmonary:      Effort: Pulmonary effort is normal.   Abdominal:      General: Abdomen is flat. Bowel sounds are normal. There is no distension.      Palpations: Abdomen is soft.      Tenderness: There is no abdominal tenderness. There is no right CVA tenderness, left CVA tenderness, guarding or rebound.      Hernia: No hernia is present.   Musculoskeletal:         General: No swelling or tenderness. Normal range of motion.      Cervical back: Normal range of motion.   Skin:     General: Skin is warm and dry.   Neurological:      Mental Status: He is alert and oriented to person, place, and time. Mental status is at baseline.   Psychiatric:         Mood and Affect: Mood normal.      ED Course     Labs Reviewed   COMP METABOLIC PANEL (14) - Abnormal; Notable for the following components:       Result Value    Glucose 405 (*)     Sodium 133 (*)     BUN 36 (*)     Creatinine 2.97 (*)     Calculated Osmolality 301 (*)     eGFR-Cr 21 (*)     Alkaline Phosphatase 174 (*)     Albumin 3.3 (*)     Globulin  4.5 (*)     A/G Ratio 0.7 (*)     All other components within normal limits   LACTIC ACID, PLASMA - Abnormal; Notable for the following components:    Lactic Acid 3.7 (*)     All other components within normal limits   LACTIC ACID REFLEX POST POSTIVE - Abnormal; Notable for the following components:    Lactic Acid 2.3 (*)     All other components within normal limits   URINALYSIS WITH CULTURE REFLEX - Abnormal; Notable for the following components:    Glucose Urine >1000 (*)     Blood Urine Trace (*)      Leukocyte Esterase Urine 75 (*)     WBC Urine 11-20 (*)     Bacteria Urine Rare (*)     All other components within normal limits   HEMOGLOBIN A1C - Abnormal; Notable for the following components:    HgbA1C 15.6 (*)     Estimated Average Glucose 401 (*)     All other components within normal limits   CBC, PLATELET; NO DIFFERENTIAL - Abnormal; Notable for the following components:    HGB 11.4 (*)     HCT 32.8 (*)     MCV 79.0 (*)     All other components within normal limits   POCT GLUCOSE - Abnormal; Notable for the following components:    POC Glucose 439 (*)     All other components within normal limits   POCT GLUCOSE - Abnormal; Notable for the following components:    POC Glucose 284 (*)     All other components within normal limits   POCT GLUCOSE - Abnormal; Notable for the following components:    POC Glucose 330 (*)     All other components within normal limits   POCT GLUCOSE - Abnormal; Notable for the following components:    POC Glucose 316 (*)     All other components within normal limits   POCT GLUCOSE - Abnormal; Notable for the following components:    POC Glucose 202 (*)     All other components within normal limits   CBC W/ DIFFERENTIAL - Abnormal; Notable for the following components:    HGB 12.6 (*)     HCT 36.6 (*)     All other components within normal limits   TROPONIN I HIGH SENSITIVITY - Normal   MAGNESIUM - Normal   LACTIC ACID REFLEX POST POSITIVE [id number] - Normal   SARS-COV-2/FLU A AND B/RSV BY PCR (GENEXPERT) - Normal    Narrative:     This test is intended for the qualitative detection and differentiation of SARS-CoV-2, influenza A, influenza B, and respiratory syncytial virus (RSV) viral RNA in nasopharyngeal or nares swabs from individuals suspected of respiratory viral infection consistent with COVID-19 by their healthcare provider. Signs and symptoms of respiratory viral infection due to SARS-CoV-2, influenza, and RSV can be similar.    Test performed using the Xpert Xpress  SARS-CoV-2/FLU/RSV (real time RT-PCR)  assay on the GeneXpert instrument, [employer], [place] [address].   This test is being used under the Food and Drug Administration's Emergency Use Authorization.    The authorized Fact Sheet for Healthcare Providers for this assay is available upon request from the laboratory.   CBC WITH DIFFERENTIAL WITH PLATELET    Narrative:     The following orders were created for panel order CBC With Differential With Platelet.  Procedure                               Abnormality         Status                     ---------                               -----------         ------                     CBC W/ DIFFERENTIAL[[id number]]          Abnormal            Final result                 Please view results for these tests on the individual orders.   BASIC METABOLIC PANEL (8)   RAINBOW DRAW LAVENDER   RAINBOW DRAW LIGHT GREEN   RAINBOW DRAW BLUE   BLOOD CULTURE   BLOOD CULTURE   URINE CULTURE, ROUTINE   EKG    Rate, intervals and axes as noted on EKG Report.  Rate: 56  Rhythm: Sinus Rhythm  Reading: T wave inversion in leads I and AVL, prior EKG w/o  inversion in lead I        CT BRAIN OR HEAD (00553)    Result Date: 4/2/2024  CONCLUSION:  1. No acute intracranial pathology. 2. 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    LOCATION:  [facility]   Dictated by (CST): Samuel Trivedi MD on 4/02/2024 at 10:29 AM     Finalized by (CST): Samuel Trivedi MD on 4/02/2024 at 10:31 AM       XR CHEST AP PORTABLE  (CPT=71045)    Result Date: 4/2/2024  CONCLUSION:   Postoperative changes of CABG.  Heart size within normal limits.  The lungs and pleural spaces are clear.   LOCATION:  [facility]      Dictated by (CST): Chuck Lowe MD on 4/02/2024 at 8:20 AM     Finalized by (CST): Chuck Lowe MD on 4/02/2024 at 8:20 AM       MDM      The differential includes the following  Sepsis 2/2 infectious etiology such as UTI, PNA   Tach arrhythmia  Dehydration    Pertinent comorbidities include  As listed above     Pertinent social  history includes  As listed above     ER course  Arrival to the emergency room patient's blood pressure soft but did start to trend downward.  He was started on a fluid bolus.  Patient's recount of the story does not sound like he had preceding chest pain but he did have possible vomiting which is concerning.  Patient has been given a liter of fluids however blood pressure now with a slight drop once again.  Patient's map is 65 however his lactic acid is 3.5.  His last echocardiogram from 2021 shows a EF of 31%.   I discussed his case with RAMÍREZ who will be doing a further review to see if his EF improved post CABG. patient's x-ray does not show signs of pneumonia but we are still waiting a urinalysis.  His symptoms could be due to volume depletion versus underlying infection.  However most suspicious for underlying sepsis.  blood cultures have already been drawn.    RAMÍREZ NORWOOD informs me pt most recent EF 50% therefore will provide sepsis fluids 30 ml/kg.  He is already received 1 L fluid bolus will continue remainder of fluids.  Pt admitted and provided empiric abx. BP improving.    Bedside US performed by myself w/o evidence of pericardial effusion, negative fast, and aorta appears normal in size     Labs  Lact 3.7     Imaging studies  I reviewed the images and agree with the radiologist report that showed the following no evidence of pneumonia on cxr and no evidence of ICH on Cth imaging     External data reviewed  Echo [id number]     Discussion of management with external providers  RAMÍREZ NORWOOD  [facility]     Admission disposition: 4/2/2024 10:27 AM      A total of 35 minutes of critical care time (exclusive of billable procedures) was administered to manage the patient's critical lab values due to his sepsis.  This involved direct patient intervention, complex decision making, and/or extensive discussions with the patient, family, and clinical staff.    [facility]   part of [facility]      Sepsis  Reassessment Note    /81 (BP Location: Left arm)   Pulse 76   Temp 98.3 °F (36.8 °C) (Oral)   Resp 20   Ht 172.7 cm (5' 8\")   Wt 79.4 kg   SpO2 97%   BMI 26.61 kg/m²      I completed the sepsis reassessment at 830    Cardiac:  Regularity: Regular  Rate: Normal  Heart Sounds: S1,S2    Lungs:   Right: Clear  Left: Clear    Peripheral Pulses:  Radial: Right 2+ or Left 2+      Capillary Refill:  <3 Secs    Skin:  Temp/Moisture: Warm and Dry  Color: Normal       Lisseth Dockery MD  4/2/2024  8:32 AM      Disposition and Plan     Clinical Impression:  1. Sepsis due to other etiology (HCC)    2. Hypotension due to hypovolemia         Disposition:  Admit  4/2/2024 10:27 am    Hospital Problems       Present on Admission  Date Reviewed: 11/5/2022            ICD-10-CM Noted POA    * (Principal) Sepsis due to other etiology (HCC) A41.89 4/2/2024 Unknown    DM (diabetes mellitus), secondary, uncontrolled, with hyperosmolarity (HCC) E13.00 4/2/2024 Unknown    Hypotension due to hypovolemia E86.1 4/2/2024 Unknown    Sinus bradycardia R00.1 4/2/2024 Unknown                 4/2 H&P  Chief Complaint: Syncope         Subjective:   History of Present Illness:   Rolando Ferrer is a 73 year old male with a near syncopal episode.  Happened earlier this morning.  He did have a slip and fall.  He called 911 is brought to the emergency department.  He denied any precipitating symptoms.  He denies any shortness of breath or chest pain.  Denies any abdominal pain nausea vomiting diarrhea.  He denies fevers or chills dysuria hematuria.     Assessment & Plan:   #Syncope- possible vasovagal vs dehydration  -CT brain No acute process   -IVF  -check orthostatics  -telemetry   -ECHO  -hold BP meds-diuretics  -Cardiology consult      #Sinus bradycardia- tele and hold BB  #Lactic acidosis with hypotension- possible sepsis- empiric Abx and IVF and trending down. F/U UC, Blood Cx   #Possible UTI- abx as above and f/u Cx   #AMADO on CKD- IVF and  monitor   #CAD s/p CABG- no acute issue- cards following   #Ischemic cardiomyopathy with chronic HFimpEF, LVEF 50% noted  on TTE 10/2023 - compensated   #PAD- hx left pop artery above knee to PT vein bypass graft 2021   #HTN  #DL  #DM II- hyperglycemic protocol   #Pseudohyponatremia 2/2 hyperglycemia           MEDICATIONS ADMINISTERED IN LAST 1 DAY:  aspirin DR tab 81 mg       Date Action Dose Route User    4/3/2024 0946 Given 81 mg Oral Leny Louie RN    4/2/2024 1323 Given 81 mg Oral Brisa Schafer RN          atorvastatin (Lipitor) tab 40 mg       Date Action Dose Route User    4/2/2024 2032 Given 40 mg Oral Jamia Hussein RN          heparin (Porcine) 5000 UNIT/ML injection 5,000 Units       Date Action Dose Route User    4/3/2024 0946 Given 5,000 Units Subcutaneous (Left Upper Abdomen) Leny Louie RN    4/2/2024 2040 Given 5,000 Units Subcutaneous (Left Lower Abdomen) Jamia Hussein RN          insulin aspart (NovoLOG) 100 Units/mL FlexPen 1-68 Units       Date Action Dose Route User    4/3/2024 1239 Given 7 Units Subcutaneous (Right Upper Arm) Leny Louie RN    4/3/2024 0850 Given 6 Units Subcutaneous (Right Upper Arm) Leny Louie RN    4/2/2024 2030 Given 6 Units Subcutaneous (Left Upper Arm) Jamia Hussein RN          insulin aspart (NovoLOG) 100 Units/mL FlexPen 1-10 Units       Date Action Dose Route User    4/3/2024 1240 Given 7 Units Subcutaneous (Right Upper Arm) Leny Louie RN    4/3/2024 0620 Given 3 Units Subcutaneous (Left Upper Arm) Jamia Hussein RN    4/2/2024 2201 Given 8 Units Subcutaneous (Right Upper Arm) Jaima Hussein RN    4/2/2024 1758 Given 10 Units Subcutaneous (Left Upper Arm) Brisa Schafer RN    4/2/2024 1325 Given 7 Units Subcutaneous (Left Upper Arm) Brisa Schafer RN          insulin degludec 100 units/mL flextouch 25 Units       Date Action Dose Route User    4/2/2024 2242 Given 25 Units Subcutaneous (Left Upper Arm) Jamia Hussein RN           PARoxetine (Paxil) tab 10 mg       Date Action Dose Route User    4/3/2024 0946 Given 10 mg Oral Leny Louie RN          Perflutren Lipid Microsphere (DEFINITY) 6.52 MG/ML injection 1.5 mL       Date Action Dose Route User    4/3/2024 1005 Given 1.5 mL Intravenous Evelyn Nieto          piperacillin-tazobactam (Zosyn) 3.375 g in dextrose 5% 100 mL IVPB-ADDV       Date Action Dose Route User    4/3/2024 0947 New Bag 3.375 g Intravenous Leny Louie RN    4/3/2024 0101 New Bag 3.375 g Intravenous Jamia Hussein RN    4/2/2024 1758 New Bag 3.375 g Intravenous Brisa Schafer RN            Vitals (last day)       Date/Time Temp Pulse Resp BP SpO2 Weight O2 Device O2 Flow Rate (L/min) [facility]    04/03/24 1100 -- -- -- 150/92 -- -- -- -- AM    04/03/24 0830 98.6 °F (37 °C) 61 18 152/83 97 % -- None (Room air) -- AMA    04/03/24 0440 98.3 °F (36.8 °C) 76 20 151/81 97 % -- -- -- EM    04/03/24 0001 98.1 °F (36.7 °C) 57 20 110/58 97 % -- -- -- EM    04/02/24 1913 98.3 °F (36.8 °C) 59 20 142/66 97 % -- -- -- EM    04/02/24 1545 98 °F (36.7 °C) 53 22 108/61 100 % -- None (Room air) -- KO    04/02/24 1230 97.4 °F (36.3 °C) 63 16 124/75 99 % -- None (Room air) 0 L/min KO    04/02/24 1130 -- 59 12 123/63 98 % -- None (Room air) -- [facility]    04/02/24 1045 -- 65 12 132/67 100 % -- None (Room air) -- [facility]    04/02/24 1030 -- 66 14 134/67 100 % -- None (Room air) -- [facility]    04/02/24 1000 -- 50 12 107/55 100 % -- None (Room air) -- VA 04/02/24 0945 -- 50 15 101/51 99 % -- None (Room air) -- [facility] 04/02/24 0930 -- 48 14 89/51 96 % -- None (Room air) -- [facility] 04/02/24 0915 -- 49 13 89/48 95 % -- None (Room air) -- [facility] 04/02/24 0900 -- 50 14 112/51 98 % -- None (Room air) -- [facility] 04/02/24 0845 -- 58 13 122/56 96 % -- None (Room air) -- [facility] 04/02/24 0830 -- 52 15 108/50 99 % -- None (Room air) -- [facility] 04/02/24 0815 -- 51 17 94/53 96 % -- None (Room air) -- [facility]    04/02/24 0811 -- 54 -- 84/47 -- -- -- -- [facility] 04/02/24 0800  98 °F (36.7 °C) 58 18 111/56 99 % 175 lb None (Room air) -- [facility]

## 2024-04-03 NOTE — DISCHARGE SUMMARY
[place] [facility]  DISCHARGE SUMMARY     Rolando Ferrer Patient Status:  Inpatient    1950 MRN [id number]   Location [facility] 3NE-A Attending Kam Hunt DO   Hosp Day # 1 PCP LUIS ALFREDO SHAFFER     Date of Admission: 2024  Date of Discharge:   4/3/2024    Discharge Disposition: Home or Self Care    Discharge Diagnosis:  Near-syncope  Sinus bradycardia  Lactic acidosis  AMADO on CKDIII  CAD with prior CABG  ICM  PAD  Essential HTN  Dyslipidemia   DMII    History of Present Illness: Rolando Ferrer is a 73 year old male with a near syncopal episode.  Happened earlier this morning.  He did have a slip and fall.  He called 911 is brought to the emergency department.  He denied any precipitating symptoms.  He denies any shortness of breath or chest pain.  Denies any abdominal pain nausea vomiting diarrhea.  He denies fevers or chills dysuria hematuria.     Brief Synopsis: Patient admitted with near syncope and bradycardia along with AMADO on CKDIII and lactic acidosis. Patient given IVF. Cardiology consulted. BB and diuretics stopped. Patient cultured due to lactic acidosis, infectious workup negative and antibiotics stopped. Patient improved with above management and discharged in stable condition.     Lace+ Score: 61  59-90 High Risk  29-58 Medium Risk  0-28   Low Risk       TCM Follow-Up Recommendation:  LACE 29-58: Moderate Risk of readmission after discharge from the hospital.  **Certification    Admission date was 2024.  Inpatient stay was shorter than expected.  Patient's Sepsis due to other etiology (HCC) was initially serious enough to expect a more lengthy hospitalization but patient improved faster than expected.                 Procedures during hospitalization:   None    Incidental or significant findings and recommendations (brief descriptions):  None    Lab/Test results pending at Discharge:   None    Consultants:  Cardiology    Discharge Medication List:     Discharge Medications         CONTINUE taking these medications        Instructions Prescription details   aspirin 81 MG Tbec      Take 1 tablet (81 mg total) by mouth daily.   Quantity: 30 tablet  Refills: 0     atorvastatin 40 MG Tabs  Commonly known as: Lipitor      Take 1 tablet (40 mg total) by mouth nightly.   Quantity: 30 tablet  Refills: 3     lisinopril 2.5 MG Tabs  Commonly known as: Prinivil; Zestril      Take 1 tablet (2.5 mg total) by mouth daily.   Quantity: 30 tablet  Refills: 0     NovoLOG Mix 70/30 FlexPen  Generic drug: insulin aspart prot & aspart 70/30      INJECT 20 UNITS UNDER THE SKIN EVERY MORNING AND 25 UNITS UNDER THE SKIN EVERY EVENING AS DIRECTED   Refills: 0     PARoxetine 10 MG Tabs  Commonly known as: Paxil      Take 1 tablet (10 mg total) by mouth every morning.   Refills: 0            STOP taking these medications      furosemide 40 MG Tabs  Commonly known as: Lasix        metoprolol succinate ER 25 MG Tb24  Commonly known as: Toprol XL                 [place]: [place]    Follow-up appointment:   Chaparro Barnes MD  [facility] [place] [address]  [phone]    Follow up in 2 week(s)  Our office will contact you for follow up    Appointments for Next 30 Days 4/3/2024 - 5/3/2024      None            Vital signs:  Temp:  [98 °F (36.7 °C)-98.6 °F (37 °C)] 98.6 °F (37 °C)  Pulse:  [53-76] 61  Resp:  [18-22] 18  BP: (108-152)/(58-92) 150/92  SpO2:  [97 %-100 %] 97 %    Physical Exam:    General: No acute distress   Lungs: clear to auscultation  Cardiovascular: S1, S2  Abdomen: Soft    -----------------------------------------------------------------------------------------------  PATIENT DISCHARGE INSTRUCTIONS: See electronic chart    Kam Hunt DO    Total time spent on discharge plannin minutes     The  [employer] C[employer] Act makes medical notes like these available to patients in the interest of transparency. Please be advised this is a medical document. Medical documents  are intended to carry relevant information, facts as evident, and the clinical opinion of the practitioner. The medical note is intended as peer to peer communication and may appear blunt or direct. It is written in medical language and may contain abbreviations or verbiage that are unfamiliar.

## 2024-04-03 NOTE — CM/SW NOTE
Notified by PT of anticipated need for home health services at discharge. HH referrals sent in [facility]. SW will f/u with pt/family regarding discharge plan.    VERA Kong  Discharge Planner

## 2024-04-03 NOTE — PROGRESS NOTES
Pt discharged to home. All discharged information/instructions given to pt. Pt verbalized full understanding of all instructions. Pt picked up by family via private car. Left the unit in safe condition.

## 2024-04-03 NOTE — OCCUPATIONAL THERAPY NOTE
OCCUPATIONAL THERAPY EVALUATION - INPATIENT    Room Number: 3614/3614-A  Evaluation Date: 4/3/2024     Type of Evaluation: Initial  Presenting Problem: s/p fall 4/2 (Sepsis)    Physician Order: IP Consult to Occupational Therapy  Reason for Therapy:  ADL/IADL Dysfunction and Discharge Planning      OCCUPATIONAL THERAPY ASSESSMENT   Patient is a 73 year old male admitted on 4/2/2024 with Presenting Problem: s/p fall 4/2 (Sepsis). Co-Morbidities : CABG x3, HTN, DM, CAD, PAD, HLD, ischemic cardiomyopathy  Patient is currently functioning at baseline with toileting, lower body dressing, grooming, bed mobility, transfers, static standing balance, dynamic standing balance, and functional standing tolerance.  Prior to admission, patient's baseline is IND with I/ADLs and functional mobility no device.  Patient met all OT goals at mod I level.  Patient reports no further questions/concerns at this time.     WEIGHT BEARING RESTRICTION  Weight Bearing Restriction: None                Recommendations for nursing staff:   Transfers: 1p SBA c. FWW  Toileting location: Toilet    EVALUATION SESSION:  Patient at start of session: Semi-supine in bed  FUNCTIONAL TRANSFER ASSESSMENT  Sit to Stand: Edge of Bed; Chair  Edge of Bed: Independent  Chair: Independent  Toilet Transfer: Independent    BED MOBILITY  Rolling: Modified Independent  Supine to Sit : Modified Independent  Scooting: IND to scoot EOB    BALANCE ASSESSMENT  Static Sitting: Independent  Sitting Unilateral: Independent  Static Standing: Independent  Standing Bilateral: Independent    FUNCTIONAL ADL ASSESSMENT  LB Dressing Seated: Independent      ACTIVITY TOLERANCE: Impaired 2/2 orthostatic changes, no c/o dizziness or pain           BP: (!) 150/92 (121/76 seated EOB, 129/80 seated EOB after 5min, 119/57 standing, 96/72 sitting post ambulation)  BP Location: Left arm  BP Method: Automatic  Patient Position: Lying    O2 SATURATIONS       COGNITION  Overall Cognitive  Status:  WFL - within functional limits  COGNITION ASSESSMENTS       Upper Extremity:   ROM: within functional limits   Strength: is within functional limits   Coordination:  Gross motor: WFL  Fine motor: WFL  Sensation: Light touch:  intact    EDUCATION PROVIDED  Patient: Role of Occupational Therapy; Plan of Care; Functional Transfer Techniques; Fall Prevention; Energy Conservation  Patient's Response to Education: Verbalized Understanding; Returned Demonstration    Equipment used: None  Demonstrates functional use    Therapist comments: Pt pleasant and agreeable to therapy. Orthostatic BP taken, no c/o dizziness. Educated pt on energy conservation and safety. BP hypotensive after ambulation with no device. Pt remains asymptomatic. No concerns with discharge    Patient End of Session: Up in chair;Needs met;Call light within reach;RN aware of session/findings;All patient questions and concerns addressed;Alarm set    OCCUPATIONAL PROFILE    HOME SITUATION  Type of Home: House  Home Layout: Two level  Lives With: Spouse    Toilet and Equipment: Standard height toilet  Shower/Tub and Equipment: Walk-in shower;Tub-shower combo  Other Equipment: None    Occupation/Status:      Drives: Yes  Patient Regularly Uses: Reading glasses    Prior Level of Function: IND with I/ADLs and functional mobility    SUBJECTIVE  \"Oh I do it all\"    PAIN ASSESSMENT  Ratin  Location: None reported or observed       OBJECTIVE  Precautions: Cardiac;Bed/chair alarm  Fall Risk: High fall risk    WEIGHT BEARING RESTRICTION  Weight Bearing Restriction: None                AM-PAC ‘6-Clicks’ Inpatient Daily Activity Short Form  -   Putting on and taking off regular lower body clothing?: None  -   Bathing (including washing, rinsing, drying)?: None  -   Toileting, which includes using toilet, bedpan or urinal? : None  -   Putting on and taking off regular upper body clothing?: None  -   Taking care of personal grooming such as  brushing teeth?: None  -   Eating meals?: None    AM-PAC Score:  Score: 24  Approx Degree of Impairment: 0%  Standardized Score (AM-PAC Scale): 57.54      ADDITIONAL TESTS     NEUROLOGICAL FINDINGS        PLAN   Patient has been evaluated and presents with no skilled Occupational Therapy needs at this time.  Patient discharged from Occupational Therapy services.  Please re-order if a new functional limitation presents during this admission.      Patient Evaluation Complexity Level:   Occupational Profile/Medical History LOW - Brief history including review of medical or therapy records    Specific performance deficits impacting engagement in ADL/IADL LOW  1 - 3 performance deficits    Client Assessment/Performance Deficits LOW - No comorbidities nor modifications of tasks    Clinical Decision Making LOW - Analysis of occupational profile, problem-focused assessments, limited treatment options    Overall Complexity LOW     OT Session Time: 15 minutes  Self-Care Home Management: 6 minutes  Therapeutic Activity: 0 minutes  Neuromuscular Re-education: 0 minutes  Therapeutic Exercise: 0 minutes  Cognitive Skills: 0 minutes  Sensory Integrative: 0 minutes  Orthotic Management and Trainin minutes  Can add/delete any of these

## 2024-04-03 NOTE — PLAN OF CARE
Assume care @ 0730  AO X4, Verbally responsive, RA  NSR on Tele. Denies pain  Continent. Up ad sara  Echo done today. IVF 0.9 @ 125ml/hr  Good appetite meals. QID accu check. Insulin as ordered.  Fall and safety precautions in place      Problem: Diabetes/Glucose Control  Goal: Glucose maintained within prescribed range  Description: INTERVENTIONS:  - Monitor Blood Glucose as ordered  - Assess for signs and symptoms of hyperglycemia and hypoglycemia  - Administer ordered medications to maintain glucose within target range  - Assess barriers to adequate nutritional intake and initiate nutrition consult as needed  - Instruct patient on self management of diabetes  Outcome: Progressing     Problem: Patient/Family Goals  Goal: Patient/Family Long Term Goal  Description: Patient's Long Term Goal: discharge home    Interventions:  - coordinate with care team when appropriate for a safe discharge  - See additional Care Plan goals for specific interventions  Outcome: Progressing  Goal: Patient/Family Short Term Goal  Description: Patient's Short Term Goal: \"get some sleep\"    Interventions:   - provide adequate sleep/rest environment  - cluster care when appropriate  - See additional Care Plan goals for specific interventions  Outcome: Progressing

## 2024-04-03 NOTE — PLAN OF CARE
Assumed patient care at 1930.  AxOX4  RA, VSS, NSR/SB on tele  Carb controlled diet  QID Accuchecks  Continent  0.9 NS infusing at 125ml/hr into R hand PIV  Right upper Arm PIV saline locked  PT/OT to see  Echo to be completed  Denies pain  Denies nausea/vomiting  Bed in lowest position. Call light within reach.   Needs met at this time.    Problem: Diabetes/Glucose Control  Goal: Glucose maintained within prescribed range  Description: INTERVENTIONS:  - Monitor Blood Glucose as ordered  - Assess for signs and symptoms of hyperglycemia and hypoglycemia  - Administer ordered medications to maintain glucose within target range  - Assess barriers to adequate nutritional intake and initiate nutrition consult as needed  - Instruct patient on self management of diabetes  4/2/2024 2330 by Jamia Hussein, RN  Outcome: Progressing  4/2/2024 2326 by Jamia Hussein, RN  Outcome: Progressing     Problem: Patient/Family Goals  Goal: Patient/Family Long Term Goal  Description: Patient's Long Term Goal: discharge home    Interventions:  - coordinate with care team when appropriate for a safe discharge  - See additional Care Plan goals for specific interventions  4/2/2024 2330 by Jamia Hussein, RN  Outcome: Progressing  4/2/2024 2326 by Jamia Hussein, RN  Outcome: Progressing  Goal: Patient/Family Short Term Goal  Description: Patient's Short Term Goal: \"get some sleep\"    Interventions:   - provide adequate sleep/rest environment  - cluster care when appropriate  - See additional Care Plan goals for specific interventions  4/2/2024 2330 by Jamia Hussein, RN  Outcome: Progressing  4/2/2024 2326 by Jamia Hussein, RN  Outcome: Progressing     Problem: Patient/Family Goals  Goal: Patient/Family Short Term Goal  Description: Patient's Short Term Goal: \"get some sleep\"    Interventions:   - provide adequate sleep/rest environment  - cluster care when appropriate  - See additional Care Plan goals for specific interventions  4/2/2024 2330  by Jamia Hussein, RN  Outcome: Progressing  4/2/2024 2326 by Jamia Hussein, RN  Outcome: Progressing

## 2024-04-04 ENCOUNTER — PATIENT OUTREACH (OUTPATIENT)
Dept: CASE MANAGEMENT | Age: 74
End: 2024-04-04

## 2024-04-04 NOTE — PROGRESS NOTES
Hospital follow up.    Last A1C Value: 15.6% Date: [4/2/2024]    No answer, left a voicemail with callback information.

## 2024-04-04 NOTE — PAYOR COMM NOTE
--------------  DISCHARGE REVIEW    Payor: JOZEF NERI [facility] [place]  Subscriber #:  [id number]  Authorization Number: [id number]    Admit date: 24  Admit time:  11:46 AM  Discharge Date: 4/3/2024  6:50 PM     Admitting Physician: Wang Pretty MD  Attending Physician:  No att. providers found  Primary Care Physician: Luis Alfredo Shaffer          Discharge Summary Notes        Discharge Summary signed by Kam Hunt DO at 4/3/2024  3:13 PM       Author: Kam Hunt DO Specialty: HOSPITALIST Author Type: Physician    Filed: 4/3/2024  3:13 PM Date of Service: 4/3/2024  3:08 PM Status: Signed    : Kam Hunt DO (Physician)           [facility]  DISCHARGE SUMMARY     Rolando Ferrer Patient Status:  Inpatient    1950 MRN [id number]   Location [facility] 3NE-A Attending Kam Hunt DO   Hosp Day # 1 PCP LUIS ALFREDO SHAFFER     Date of Admission: 2024  Date of Discharge:   4/3/2024    Discharge Disposition: Home or Self Care    Discharge Diagnosis:  Near-syncope  Sinus bradycardia  Lactic acidosis  AMADO on CKDIII  CAD with prior CABG  ICM  PAD  Essential HTN  Dyslipidemia   DMII    History of Present Illness: Rolando Ferrer is a 73 year old male with a near syncopal episode.  Happened earlier this morning.  He did have a slip and fall.  He called 911 is brought to the emergency department.  He denied any precipitating symptoms.  He denies any shortness of breath or chest pain.  Denies any abdominal pain nausea vomiting diarrhea.  He denies fevers or chills dysuria hematuria.     Brief Synopsis: Patient admitted with near syncope and bradycardia along with AMADO on CKDIII and lactic acidosis. Patient given IVF. Cardiology consulted. BB and diuretics stopped. Patient cultured due to lactic acidosis, infectious workup negative and antibiotics stopped. Patient improved with above management and discharged in stable condition.     Lace+ Score: 61  59-90 High Risk  29-58 Medium Risk  0-28   Low Risk       TCM  Follow-Up Recommendation:  LACE 29-58: Moderate Risk of readmission after discharge from the hospital.  **Certification    Admission date was 4/2/2024.  Inpatient stay was shorter than expected.  Patient's Sepsis due to other etiology (HCC) was initially serious enough to expect a more lengthy hospitalization but patient improved faster than expected.                 Procedures during hospitalization:   None    Incidental or significant findings and recommendations (brief descriptions):  None    Lab/Test results pending at Discharge:   None    Consultants:  Cardiology    Discharge Medication List:     Discharge Medications        CONTINUE taking these medications        Instructions Prescription details   aspirin 81 MG Tbec      Take 1 tablet (81 mg total) by mouth daily.   Quantity: 30 tablet  Refills: 0     atorvastatin 40 MG Tabs  Commonly known as: Lipitor      Take 1 tablet (40 mg total) by mouth nightly.   Quantity: 30 tablet  Refills: 3     lisinopril 2.5 MG Tabs  Commonly known as: Prinivil; Zestril      Take 1 tablet (2.5 mg total) by mouth daily.   Quantity: 30 tablet  Refills: 0     NovoLOG Mix 70/30 FlexPen  Generic drug: insulin aspart prot & aspart 70/30      INJECT 20 UNITS UNDER THE SKIN EVERY MORNING AND 25 UNITS UNDER THE SKIN EVERY EVENING AS DIRECTED   Refills: 0     PARoxetine 10 MG Tabs  Commonly known as: Paxil      Take 1 tablet (10 mg total) by mouth every morning.   Refills: 0            STOP taking these medications      furosemide 40 MG Tabs  Commonly known as: Lasix        metoprolol succinate ER 25 MG Tb24  Commonly known as: Toprol XL                 [place]: [place]    Follow-up appointment:   Chaparro Barnes MD  [facility] [address] [address]  [phone]    Follow up in 2 week(s)  Our office will contact you for follow up    Appointments for Next 30 Days 4/3/2024 - 5/3/2024      None            Vital signs:  Temp:  [98 °F (36.7 °C)-98.6 °F (37 °C)]  98.6 °F (37 °C)  Pulse:  [53-76] 61  Resp:  [18-22] 18  BP: (108-152)/(58-92) 150/92  SpO2:  [97 %-100 %] 97 %    Physical Exam:    General: No acute distress   Lungs: clear to auscultation  Cardiovascular: S1, S2  Abdomen: Soft    -----------------------------------------------------------------------------------------------  PATIENT DISCHARGE INSTRUCTIONS: See electronic chart    Kam Hunt DO    Total time spent on discharge plannin minutes     The  [employer] Cures Act makes medical notes like these available to patients in the interest of transparency. Please be advised this is a medical document. Medical documents are intended to carry relevant information, facts as evident, and the clinical opinion of the practitioner. The medical note is intended as peer to peer communication and may appear blunt or direct. It is written in medical language and may contain abbreviations or verbiage that are unfamiliar.       Electronically signed by Kam Hunt DO on 4/3/2024  3:13 PM         REVIEWER COMMENTS

## 2024-04-05 NOTE — PROGRESS NOTES
Hospital follow up.    Last A1C Value: 15.6% Date: [4/2/2024]    No answer, left a voicemail with callback information.    Closing encounter.

## 2025-03-05 ENCOUNTER — TELEPHONE (OUTPATIENT)
Dept: OPTOMETRY | Age: 75
End: 2025-03-05

## 2025-03-10 ENCOUNTER — APPOINTMENT (OUTPATIENT)
Dept: OPTOMETRY | Age: 75
End: 2025-03-10

## 2025-03-10 DIAGNOSIS — E11.3553 STABLE PROLIFERATIVE DIABETIC RETINOPATHY OF BOTH EYES ASSOCIATED WITH TYPE 2 DIABETES MELLITUS (CMD): Primary | ICD-10-CM

## 2025-03-10 DIAGNOSIS — H35.373 EPIRETINAL MEMBRANE (ERM) OF BOTH EYES: ICD-10-CM

## 2025-03-10 DIAGNOSIS — Z96.1 PSEUDOPHAKIA OF BOTH EYES: ICD-10-CM

## 2025-03-10 PROCEDURE — 92134 CPTRZ OPH DX IMG PST SGM RTA: CPT

## 2025-03-10 PROCEDURE — 92004 COMPRE OPH EXAM NEW PT 1/>: CPT

## 2025-03-10 ASSESSMENT — VISUAL ACUITY
OD_SC+: -2
OD_SC: J10
OS_SC: J10
OS_SC: 20/125
OS_PH_SC: 20/100
METHOD: SNELLEN - LINEAR
OD_SC: 20/30

## 2025-03-10 ASSESSMENT — CONF VISUAL FIELD
OS_SUPERIOR_TEMPORAL_RESTRICTION: 0
OD_SUPERIOR_TEMPORAL_RESTRICTION: 0
OD_SUPERIOR_NASAL_RESTRICTION: 0
OD_NORMAL: 1
OS_NORMAL: 1
OS_INFERIOR_NASAL_RESTRICTION: 0
OD_INFERIOR_TEMPORAL_RESTRICTION: 0
OD_INFERIOR_NASAL_RESTRICTION: 0
OS_SUPERIOR_NASAL_RESTRICTION: 0
OS_INFERIOR_TEMPORAL_RESTRICTION: 0

## 2025-03-10 ASSESSMENT — TONOMETRY
OD_IOP_MMHG: 14
OS_IOP_MMHG: 15
IOP_METHOD: APPLANATION

## 2025-03-10 ASSESSMENT — SLIT LAMP EXAM - LIDS
COMMENTS: NORMAL
COMMENTS: NORMAL

## 2025-03-10 ASSESSMENT — CUP TO DISC RATIO
OS_RATIO: 0.2
OD_RATIO: 0.3

## 2025-03-11 ENCOUNTER — TELEPHONE (OUTPATIENT)
Dept: OPTOMETRY | Age: 75
End: 2025-03-11

## 2025-03-11 DIAGNOSIS — H35.373 EPIRETINAL MEMBRANE (ERM) OF BOTH EYES: Primary | ICD-10-CM

## 2025-03-17 ENCOUNTER — APPOINTMENT (OUTPATIENT)
Dept: OPTOMETRY | Age: 75
End: 2025-03-17

## 2025-03-17 ENCOUNTER — APPOINTMENT (OUTPATIENT)
Dept: OPHTHALMOLOGY | Age: 75
End: 2025-03-17

## 2025-04-29 ENCOUNTER — OFFICE VISIT (OUTPATIENT)
Dept: OPHTHALMOLOGY | Age: 75
End: 2025-04-29

## 2025-04-29 DIAGNOSIS — Z79.4 TYPE 2 DIABETES MELLITUS WITH BOTH EYES AFFECTED BY MILD NONPROLIFERATIVE RETINOPATHY WITHOUT MACULAR EDEMA, WITH LONG-TERM CURRENT USE OF INSULIN (CMD): Primary | ICD-10-CM

## 2025-04-29 DIAGNOSIS — E11.3293 TYPE 2 DIABETES MELLITUS WITH BOTH EYES AFFECTED BY MILD NONPROLIFERATIVE RETINOPATHY WITHOUT MACULAR EDEMA, WITH LONG-TERM CURRENT USE OF INSULIN (CMD): Primary | ICD-10-CM

## 2025-04-29 DIAGNOSIS — H35.373 MACULAR PUCKER, BILATERAL: ICD-10-CM

## 2025-10-07 ENCOUNTER — APPOINTMENT (OUTPATIENT)
Dept: OPHTHALMOLOGY | Age: 75
End: 2025-10-07

## (undated) DIAGNOSIS — Z95.1 S/P CABG (CORONARY ARTERY BYPASS GRAFT): Primary | ICD-10-CM

## (undated) DEVICE — TRAY ENDOVEIN KTV16 HARVESTING

## (undated) DEVICE — CATH THORACIC 36F 5 EYLT

## (undated) DEVICE — VIOLET BRAIDED (POLYGLACTIN 910), SYNTHETIC ABSORBABLE SUTURE: Brand: COATED VICRYL

## (undated) DEVICE — SOL  .9 500ML

## (undated) DEVICE — CHLORAPREP 26ML APPLICATOR

## (undated) DEVICE — SUTURE PROLENE 7-0 BV-1

## (undated) DEVICE — PROXIMATE RH ROTATING HEAD SKIN STAPLERS (35 WIDE) CONTAINS 35 STAINLESS STEEL STAPLES: Brand: PROXIMATE

## (undated) DEVICE — BARD® PTFE FELT PLEDGETS, (OVAL), 4.8 MM X 6 MM: Brand: BARD® PTFE FELT PLEDGETS

## (undated) DEVICE — SUTURE VICRYL 2-0 CT-1

## (undated) DEVICE — FOGARTY ARTERIAL EMBOLECTOMY CATHETER 3F 40CM: Brand: FOGARTY

## (undated) DEVICE — 3M™ IOBAN™ 2 ANTIMICROBIAL INCISE DRAPE 6651EZ: Brand: IOBAN™ 2

## (undated) DEVICE — SPECIMEN TRAP LUKI

## (undated) DEVICE — GOWN,SIRUS,FAB REINF,RAGLAN,XL,STERILE: Brand: MEDLINE

## (undated) DEVICE — CELL SAVER RESERVOIR

## (undated) DEVICE — STERILE SYNTHETIC POLYISOPRENE POWDER-FREE SURGICAL GLOVES WITH HYDROGEL COATING: Brand: PROTEXIS

## (undated) DEVICE — GAUZE SPONGES,12 PLY: Brand: CURITY

## (undated) DEVICE — 1200CC GUARDIAN II: Brand: GUARDIAN

## (undated) DEVICE — STERILE POLYISOPRENE POWDER-FREE SURGICAL GLOVES: Brand: PROTEXIS

## (undated) DEVICE — TRANSPOSAL ULTRAFLEX DUO/QUAD ULTRA CART MANIFOLD

## (undated) DEVICE — GLOVE SURG TRIUMPH SZ 8

## (undated) DEVICE — OPEN HEART: Brand: MEDLINE INDUSTRIES, INC.

## (undated) DEVICE — LEAD TEMP PACING UNIPOLAR 6500

## (undated) DEVICE — PREVENA PEEL & PLACE SYSTEM KIT- 13 CM: Brand: PREVENA™ PEEL & PLACE™

## (undated) DEVICE — MEGADYNE E-Z CLEAN 2.5\" BLADE

## (undated) DEVICE — CV PACK-LF: Brand: MEDLINE INDUSTRIES, INC.

## (undated) DEVICE — ZIMMER® STERILE DISPOSABLE TOURNIQUET CUFF WITH PLC, DUAL PORT, SINGLE BLADDER, 18 IN. (46 CM)

## (undated) DEVICE — 3M™ IOBAN™ 2 ANTIMICROBIAL INCISE DRAPE 6650EZ: Brand: IOBAN™ 2

## (undated) DEVICE — Device

## (undated) DEVICE — HEMOCLIP MED 24 CLIP/CARTRIDGE

## (undated) DEVICE — SUTURE PROLENE 6-0 C-1

## (undated) DEVICE — CELL SAVER BAG 600ML 4R2023

## (undated) DEVICE — TOWEL SURG OR 17X30IN BLUE

## (undated) DEVICE — SUTURE SILK 0 FSL

## (undated) DEVICE — SUTURE VICRYL 3-0 PS-1

## (undated) DEVICE — SYRINGE 30ML LL TIP

## (undated) DEVICE — GOWN SURG AERO CHROME XXL

## (undated) DEVICE — PROXIMATE SKIN STAPLERS (35 WIDE) CONTAINS 35 STAINLESS STEEL STAPLES (FIXED HEAD): Brand: PROXIMATE

## (undated) DEVICE — CELL SAVER TUBING BRAT

## (undated) DEVICE — SUTURE PROLENE 8-0 BV-130-5

## (undated) DEVICE — FAN SPRAY KIT: Brand: PULSAVAC®

## (undated) DEVICE — SOL  .9 1000ML BTL

## (undated) DEVICE — BLOWER CO2 MEDTRONIC/DLP 22150

## (undated) DEVICE — SUTURE ETHIBOND EXCEL 3-0 SH

## (undated) DEVICE — CELL SAVER 5/5+ BOWL KIT-225ML: Brand: HAEMONETICS CELL SAVER 5/5+ SYSTEMS

## (undated) DEVICE — HOOK LOCK LATEX FREE ELASTIC BANDAGE 4INX5YD

## (undated) DEVICE — SUTURE POLYDEK 2-0

## (undated) DEVICE — CELL SAVER RESERVOIR BRAT

## (undated) DEVICE — KENDALL SCD EXPRESS SLEEVES, KNEE LENGTH, MEDIUM: Brand: KENDALL SCD

## (undated) DEVICE — SUTURE ETHILON 2-0 FS

## (undated) DEVICE — TROCAR CATHETER,SHARP TIP: Brand: ARGYLE

## (undated) DEVICE — DRAIN CHEST DRY ADULT/PED

## (undated) DEVICE — SOL LACT RINGERS 1000ML

## (undated) DEVICE — LOWER EXTREMITY CDS-LF: Brand: MEDLINE INDUSTRIES, INC.

## (undated) DEVICE — BANDAGE ROLL,100% COTTON, 6 PLY, LARGE: Brand: KERLIX

## (undated) DEVICE — SOLUTION SURG DURA PREP HAZMAT

## (undated) DEVICE — NON-ADHERENT STRIPS,OIL EMULSION: Brand: CURITY

## (undated) DEVICE — CONVERTORS STOCKINETTE: Brand: CONVERTORS

## (undated) DEVICE — TIBURON DRAPE TOWELS: Brand: CONVERTORS

## (undated) DEVICE — PADDING CAST SOFT ROLL 4\"

## (undated) DEVICE — SUTURE SILK 2-0

## (undated) DEVICE — SUTURE POLYDEK 4-0 TIES 6-932

## (undated) DEVICE — SUTURE WIRE DOUBLE STERNOTOMY

## (undated) DEVICE — GEL AQUASONIC 100 20GR

## (undated) DEVICE — 1010 S-DRAPE TOWEL DRAPE 10/BX: Brand: STERI-DRAPE™

## (undated) DEVICE — SUTURE PROLENE 5-0 C-1

## (undated) DEVICE — #15 STERILE STAINLESS BLADE: Brand: STERILE STAINLESS BLADES

## (undated) DEVICE — KIT CATH PLR STRT PLEURX 1000

## (undated) NOTE — LETTER
[address] [facility] FOR BLOOD OR BLOOD COMPONENTS      In the course of your treatment, it may become necessary to administer a transfusion of blood or blood components.  This form provides basic information concerning this proc If loss of blood poses serious threats in the course of your treatment, THERE IS NO EFFECTIVE ALTERNATIVE TO BLOOD TRANSFUSION.  However, if you have any further questions on this matter, your physician will fully explain the alternatives to you if it has n

## (undated) NOTE — LETTER
Ysabel Mcdaniel [address]  [address] [facility] [address], [address] [facility]  Authorization for Surgical Operation and Procedure     Date:___________                                                                                                         Time:__________ 4.   Should the need arise during my operation or immediate post-operative period, I also consent to the administration of blood and/or blood products.   Further, I understand that despite careful testing and screening of blood or blood products by patricia 8.   I recognize that in the event my procedure results in extended X-Ray/fluoroscopy time, I may develop a skin reaction. 9.  If I have a Do Not Attempt Resuscitation (DNAR) order in place, that status will be suspended while in the operating room, proc 1. ILisandro agree to be cared for by an anesthesiologist, who is specially trained to monitor me and give me medicine to put me to sleep or keep me comfortable during my procedure    I understand that my anesthesiologist is not an employee or agent 5. My doctor has explained to me other choices available to me for my care (alternatives).   6. Pregnant Patients (“epidural”):  I understand that the risks of having an epidural (medicine given into my back to help control pain during labor), include itchi

## (undated) NOTE — LETTER
[facility] [address], [address] [place] [address]    Consent for Anesthesia   1.   IJosé Miguel agree to be cared for by a physician anesthesiologist alone and/or with a nurse anesthetist, who is specially trained to monitor me and give me me · Rare risks include: remembering what happened during my procedure, allergic reactions to medications, injury to my airway, heart, lungs, vision, nerves, or muscles and in extremely rare instances death.   5. My doctor has explained to me other choices [place] Patient Name: Julia Little     : 1950                 Printed: 2021 at 1:53 PM    Medical Record #: [id number]                                            Page 1 of 1

## (undated) NOTE — LETTER
Date: 5/26/2021  Patient name: Zeynep Irizarry  YOB: 1950  Medical Record Number: [id number]  Coverage: Payor: MEDICARE / Plan: MEDICARE PART A&B / Product Type: *No Product type* /   Insurance ID: [id number]  Patient Address: [address] [place] [address] Cleansing:    Cleanse with soap and water     - Cleansing:    Cleanse with normal saline or wound cleanser     - Frequency:    Change dressing weekly   05/11/21 1409 OP WOUND DRESSING Routine Completed Shonna Foley APRN     - Dressing:    Prism collagen

## (undated) NOTE — LETTER
Ysabel Mcdaniel [address]  [address] [facility] [address], [address] [facility]  Authorization for Surgical Operation and Procedure     Date:___________                                                                                                         Time:__________ 4.   Should the need arise during my operation or immediate post-operative period, I also consent to the administration of blood and/or blood products.   Further, I understand that despite careful testing and screening of blood or blood products by patricia 8.   I recognize that in the event my procedure results in extended X-Ray/fluoroscopy time, I may develop a skin reaction. 9.  If I have a Do Not Attempt Resuscitation (DNAR) order in place, that status will be suspended while in the operating room, proc 1. IDomenic agree to be cared for by an anesthesiologist, who is specially trained to monitor me and give me medicine to put me to sleep or keep me comfortable during my procedure    I understand that my anesthesiologist is not an employee or agent 5. My doctor has explained to me other choices available to me for my care (alternatives).   6. Pregnant Patients (“epidural”):  I understand that the risks of having an epidural (medicine given into my back to help control pain during labor), include itchi

## (undated) NOTE — LETTER
Ysabel Mcdaniel 182  [address] [facility] [address], [address] [facility]  Authorization for Surgical Operation and Procedure     Date:___________                                                                                                         Time:__________ 4.   Should the need arise during my operation or immediate post-operative period, I also consent to the administration of blood and/or blood products.   Further, I understand that despite careful testing and screening of blood or blood products by patricia 8.   I recognize that in the event my procedure results in extended X-Ray/fluoroscopy time, I may develop a skin reaction. 9.  If I have a Do Not Attempt Resuscitation (DNAR) order in place, that status will be suspended while in the operating room, proc 1. ISuad agree to be cared for by an anesthesiologist, who is specially trained to monitor me and give me medicine to put me to sleep or keep me comfortable during my procedure    I understand that my anesthesiologist is not an employee or agent 5. My doctor has explained to me other choices available to me for my care (alternatives).   6. Pregnant Patients (“epidural”):  I understand that the risks of having an epidural (medicine given into my back to help control pain during labor), include itchi

## (undated) NOTE — LETTER
[facility] [address], [address]  Consent for Procedure/Sedation    Date: 2/8/21    Time: 0930      1.  I authorize the performance upon Lea Warner the following:cardiac catheterization, left ventricular cineangiography, bilate Signature of person authorized                                     Relationship to  to consent for patient: _________________________ patient: ___________________    Witness: _______________________________ Date: _____________________    Printed: 2/8/2021

## (undated) NOTE — LETTER
Ysabel Mcdaniel 182  [address] [facility] [address], [address] [facility]  Authorization for Surgical Operation and Procedure     Date:___________                                                                                                         Time:__________ 4.   Should the need arise during my operation or immediate post-operative period, I also consent to the administration of blood and/or blood products.   Further, I understand that despite careful testing and screening of blood or blood products by patricia 8.   I recognize that in the event my procedure results in extended X-Ray/fluoroscopy time, I may develop a skin reaction. 9.  If I have a Do Not Attempt Resuscitation (DNAR) order in place, that status will be suspended while in the operating room, proc 1. ITirso agree to be cared for by an anesthesiologist, who is specially trained to monitor me and give me medicine to put me to sleep or keep me comfortable during my procedure    I understand that my anesthesiologist is not an employee or agent 5. My doctor has explained to me other choices available to me for my care (alternatives).   6. Pregnant Patients (“epidural”):  I understand that the risks of having an epidural (medicine given into my back to help control pain during labor), include itchi

## (undated) NOTE — LETTER
[address] [facility] FOR BLOOD OR BLOOD COMPONENTS      In the course of your treatment, it may become necessary to administer a transfusion of blood or blood components.  This form provides basic information concerning this proc alternatives to you if it has not already been done. I,Rolando Ferrer, have read/had read to me the above. I understand the matters bearing on the decision whether or not to authorize a transfusion of blood or blood components.  I have no questions which ha

## (undated) NOTE — LETTER
Ysabel Mcdaniel 182  [address] [facility] [address], [address] [facility]  Authorization for Surgical Operation and Procedure     Date:___________                                                                                                         Time:__________ that can occur: fever and allergic reactions, hemolytic reactions, transmission of diseases such as Hepatitis, AIDS and Cytomegalovirus (CMV) and fluid overload.   In the event that I wish to have an autologous transfusion of my own blood, or a directed don when the applicable recovery period ends for purposes of reinstating the DNAR order.   10. Patients having a sterilization procedure: I understand that if the procedure is successful the results will be permanent and it will therefore be impossible for me t give me medicine and do additional procedures as necessary. Some examples are: Starting or using an “IV” to give me medicine, fluids or blood during my procedure, and having a breathing tube placed to help me breathe when I’m asleep (intubation).  In the ev rare but potential complications include headache, bleeding, infection, seizure, irregular heart rhythms, and nerve injury.     I can change my mind about having anesthesia services at any time before I get the medicine.    _________________________________

## (undated) NOTE — LETTER
Ysabel Mcdaniel 89  [address] [facility] [address], [address] [facility]  Authorization for Surgical Operation and Procedure     Date:__12/30/2021____                                                                                                         Time:__17:00 reactions, transmission of diseases such as Hepatitis, AIDS and Cytomegalovirus (CMV) and fluid overload. In the event that I wish to have an autologous transfusion of my own blood, or a directed donor transfusion. I will discuss this with my physician. reinstating the DNAR order. 10. Patients having a sterilization procedure: I understand that if the procedure is successful the results will be permanent and it will therefore be impossible for me to inseminate, conceive, or bear children.   I also underst necessary. Some examples are: Starting or using an “IV” to give me medicine, fluids or blood during my procedure, and having a breathing tube placed to help me breathe when I’m asleep (intubation).  In the event that my heart stops working properly, I under headache, bleeding, infection, seizure, irregular heart rhythms, and nerve injury.     I can change my mind about having anesthesia services at any time before I get the medicine.    __________________________________________________________________________

## (undated) NOTE — LETTER
Ysabel Mcdaniel 89  [address] [facility] [address], [address] [facility]  Authorization for Surgical Operation and Procedure     Date:___________                                                                                                         Time:__________ 4.   Should the need arise during my operation or immediate post-operative period, I also consent to the administration of blood and/or blood products.   Further, I understand that despite careful testing and screening of blood or blood products by patricia 8.   I recognize that in the event my procedure results in extended X-Ray/fluoroscopy time, I may develop a skin reaction. 9.  If I have a Do Not Attempt Resuscitation (DNAR) order in place, that status will be suspended while in the operating room, proc 1. IZacarias agree to be cared for by an anesthesiologist, who is specially trained to monitor me and give me medicine to put me to sleep or keep me comfortable during my procedure    I understand that my anesthesiologist is not an employee or agent 5. My doctor has explained to me other choices available to me for my care (alternatives).   6. Pregnant Patients (“epidural”):  I understand that the risks of having an epidural (medicine given into my back to help control pain during labor), include itchi

## (undated) NOTE — LETTER
[facility] A. Jorge Luis Silva M.D., F.A.C.S. Carmon Jeans, M.D., F.A.C.S. Amanda Hill M.D., Kelsi Lovelace M.D., F.A.C.S. Fabrizio Guerra. Hector Link M.D., F.A.C.S. Doug Rivera M.D. Verner Overland, M. Sonia Applebaum A.D. Zoe German, M.D., F. concerns answered. If there are any issues which have not been adequately addressed, we ask you to bring them forward so that we can thoroughly address them.     A patient who is fully informed and understands their condition and options for treatment, as w Yes _____ No _____    A [facility] surgeon as explained to me that if I should so desire, he/she is willing to explain my case and the surgical and non-surgical options to family members:             Yes _____ No _____    A [facility] surgeon has answered all of my qu

## (undated) NOTE — LETTER
Ysabel Mcdaniel 89  [address] [facility] [address], [address] [facility]  Authorization for Surgical Operation and Procedure     Date:___________                                                                                                         Time:__________ 4.   Should the need arise during my operation or immediate post-operative period, I also consent to the administration of blood and/or blood products.   Further, I understand that despite careful testing and screening of blood or blood products by patricia 8.   I recognize that in the event my procedure results in extended X-Ray/fluoroscopy time, I may develop a skin reaction. 9.  If I have a Do Not Attempt Resuscitation (DNAR) order in place, that status will be suspended while in the operating room, proc 1. IEstephania agree to be cared for by an anesthesiologist, who is specially trained to monitor me and give me medicine to put me to sleep or keep me comfortable during my procedure    I understand that my anesthesiologist is not an employee or agent 5. My doctor has explained to me other choices available to me for my care (alternatives).   6. Pregnant Patients (“epidural”):  I understand that the risks of having an epidural (medicine given into my back to help control pain during labor), include itchi

## (undated) NOTE — LETTER
[facility] [address], [address] [place] [address]    Consent for Anesthesia   1.   IDar agree to be cared for by a physician anesthesiologist alone and/or with a nurse anesthetist, who is specially trained to monitor me and give me me allergic reactions to medications, injury to my airway, heart, lungs, vision, nerves, or muscles and in extremely rare instances death. 5. My doctor has explained to me other choices available to me for my care (alternatives).   6. Pregnant Patients (“epid Printed: 1/3/2022 at 2:57 PM    Medical Record #: [id number]                                            Page 1 of 1

## (undated) NOTE — LETTER
Ysabel Mcdaniel 182  [address] [facility] [address], [address] [facility]  Authorization for Surgical Operation and Procedure     Date:___________                                                                                                         Time:__________ 4.   Should the need arise during my operation or immediate post-operative period, I also consent to the administration of blood and/or blood products.   Further, I understand that despite careful testing and screening of blood or blood products by patricia 8.   I recognize that in the event my procedure results in extended X-Ray/fluoroscopy time, I may develop a skin reaction. 9.  If I have a Do Not Attempt Resuscitation (DNAR) order in place, that status will be suspended while in the operating room, proc 1. IJin agree to be cared for by an anesthesiologist, who is specially trained to monitor me and give me medicine to put me to sleep or keep me comfortable during my procedure    I understand that my anesthesiologist is not an employee or agent 5. My doctor has explained to me other choices available to me for my care (alternatives).   6. Pregnant Patients (“epidural”):  I understand that the risks of having an epidural (medicine given into my back to help control pain during labor), include itchi

## (undated) NOTE — LETTER
1. I authorize the performance upon Shira Hopping the followin. I authorize Dr. _________________________ (and whomever is designated as the doctor’s assistant), to perform the above mentioned procedures.     3. If any unforeseen conditions arise d

## (undated) NOTE — LETTER
Ysabel Mcdaniel 89  [address] [facility] [address], [address] [facility]  Authorization for Surgical Operation and Procedure     Date:___________                                                                                                         Time:__________ transfusion and/or blood products.   The following are some, but not all, of the potential risks that can occur: fever and allergic reactions, hemolytic reactions, transmission of diseases such as Hepatitis, AIDS and Cytomegalovirus (CMV) and fluid overload person authorized to consent on my behalf). The surgeon or my attending physician will determine when the applicable recovery period ends for purposes of reinstating the DNAR order.   10. Patients having a sterilization procedure: I understand that if the p asking for anesthesia services, I agree to:  a. Allow the anesthesiologist (anesthesia doctor) to give me medicine and do additional procedures as necessary.  Some examples are: Starting or using an “IV” to give me medicine, fluids or blood during my proced nerve injury. 7. Regional Anesthesia (“spinal”, “epidural”, & “nerve blocks”): I understand that rare but potential complications include headache, bleeding, infection, seizure, irregular heart rhythms, and nerve injury.     I can change my mind about hav